# Patient Record
Sex: FEMALE | Race: WHITE | NOT HISPANIC OR LATINO | Employment: OTHER | URBAN - METROPOLITAN AREA
[De-identification: names, ages, dates, MRNs, and addresses within clinical notes are randomized per-mention and may not be internally consistent; named-entity substitution may affect disease eponyms.]

---

## 2017-05-12 ENCOUNTER — HOSPITAL ENCOUNTER (EMERGENCY)
Facility: HOSPITAL | Age: 47
Discharge: HOME | End: 2017-05-12
Attending: EMERGENCY MEDICINE
Payer: COMMERCIAL

## 2017-05-12 VITALS
HEART RATE: 88 BPM | RESPIRATION RATE: 17 BRPM | HEIGHT: 62 IN | TEMPERATURE: 98.5 F | OXYGEN SATURATION: 97 % | SYSTOLIC BLOOD PRESSURE: 139 MMHG | WEIGHT: 165 LBS | DIASTOLIC BLOOD PRESSURE: 81 MMHG | BODY MASS INDEX: 30.36 KG/M2

## 2017-05-12 DIAGNOSIS — J32.9 SINUSITIS: Primary | ICD-10-CM

## 2017-05-12 PROCEDURE — 99283 EMERGENCY DEPT VISIT LOW MDM: CPT

## 2017-05-12 RX ORDER — ALPRAZOLAM 2 MG/1
0.5 TABLET ORAL AS NEEDED
COMMUNITY

## 2017-05-12 RX ORDER — AZITHROMYCIN 250 MG/1
250 TABLET, FILM COATED ORAL DAILY
Qty: 6 TABLET | Refills: 0 | Status: SHIPPED | OUTPATIENT
Start: 2017-05-12 | End: 2017-05-17

## 2017-05-12 RX ORDER — SIMVASTATIN 40 MG
40 TABLET ORAL DAILY
COMMUNITY

## 2017-05-12 RX ORDER — DULOXETIN HYDROCHLORIDE 60 MG/1
60 CAPSULE, DELAYED RELEASE ORAL DAILY
COMMUNITY
Start: 2014-02-07

## 2017-05-12 RX ORDER — ESOMEPRAZOLE MAGNESIUM 10 MG/1
40 GRANULE, FOR SUSPENSION, EXTENDED RELEASE ORAL 2 TIMES DAILY
COMMUNITY
Start: 2014-02-07 | End: 2019-03-14 | Stop reason: CLARIF

## 2017-05-12 RX ORDER — METFORMIN HYDROCHLORIDE EXTENDED-RELEASE TABLETS 1000 MG/1
500 TABLET, FILM COATED, EXTENDED RELEASE ORAL
COMMUNITY

## 2018-01-12 NOTE — RESULT NOTES
Verified Results  NM GASTRIC EMPTYING [id number] 08:28AM Perry Rao Order Number: [id number]     Test Name Result Flag Reference   NM GASTRIC EMPTYING (Report)     GASTRIC EMPTYING STUDY     INDICATION: Irritable bowel syndrome, diarrhea      COMPARISON: None available     TECHNIQUE:  The study was performed following the oral administration of 0 93 mCi Tc-99m sulfur colloid combined with scrambled eggs, as part of a standard meal  Following the meal, one minute anterior and posterior images were obtained immediately and   at 0 25 hours, 0 5 hour, 1 hour, 1 5 hour, 2 hour, 3 hour and 4 hour intervals from the time of ingestion  Geometric mean analyses were then performed  As of March 1, 2016, this gastric emptying protocol has been modified and updated  The gastric    emptying times and the normal values reported below reflect the change in protocol  FINDINGS:     Gastric emptying at 0 5 hours = 10 (N < 30%)    Gastric emptying at 1 hour = 19 % (N = 10 - 70%)   Gastric emptying at 2 hours = 38 % (N = > 40%)   Gastric emptying at 3 hours = 58 % (N = > 70%)   Gastric emptying at 4 hours = 77 % (N = >90%)     Linear T-1/2 = 156 minutes         IMPRESSION:     Mild to moderately decreased rate of gastric emptying  Workstation performed: [id number]     Signed by:   Wendi Mauricio MD   7/6/16       Plan  Chronic gastroesophageal reflux disease, Colon cancer screening    · COLONOSCOPY ; every 4 years; Next [id number]; Status:Active  Diabetic gastroparesis    · Metoclopramide HCl - 5 MG Oral Tablet;  Take 1 tablet twice daily

## 2018-07-16 LAB
LEFT EYE DIABETIC RETINOPATHY: NORMAL
RIGHT EYE DIABETIC RETINOPATHY: NORMAL

## 2018-09-13 LAB
CREAT ?TM UR-SCNC: 164 MG/DL (ref 50–200)
EST. AVERAGE GLUCOSE BLD GHB EST-MCNC: 131 MG/DL
HBA1C MFR BLD HPLC: 6.2 % (ref 4.8–5.6)
MICROALBUMIN UR-MCNC: 2.8 MG/DL (ref 0–2)
MICROALBUMIN/CREAT UR: 17.1

## 2018-09-13 PROCEDURE — 3061F NEG MICROALBUMINURIA REV: CPT | Performed by: INTERNAL MEDICINE

## 2018-09-13 PROCEDURE — 3044F HG A1C LEVEL LT 7.0%: CPT | Performed by: INTERNAL MEDICINE

## 2018-09-14 ENCOUNTER — APPOINTMENT (OUTPATIENT)
Dept: RADIOLOGY | Facility: CLINIC | Age: 48
End: 2018-09-14
Payer: COMMERCIAL

## 2018-09-14 ENCOUNTER — OFFICE VISIT (OUTPATIENT)
Dept: INTERNAL MEDICINE CLINIC | Facility: CLINIC | Age: 48
End: 2018-09-14
Payer: COMMERCIAL

## 2018-09-14 VITALS
HEART RATE: 103 BPM | OXYGEN SATURATION: 96 % | SYSTOLIC BLOOD PRESSURE: 124 MMHG | BODY MASS INDEX: 28.82 KG/M2 | DIASTOLIC BLOOD PRESSURE: 86 MMHG | HEIGHT: 62 IN | WEIGHT: 156.6 LBS

## 2018-09-14 DIAGNOSIS — F31.76 BIPOLAR DISORDER, IN FULL REMISSION, MOST RECENT EPISODE DEPRESSED (HCC): ICD-10-CM

## 2018-09-14 DIAGNOSIS — E78.2 MIXED HYPERLIPIDEMIA: ICD-10-CM

## 2018-09-14 DIAGNOSIS — Z71.6 TOBACCO ABUSE COUNSELING: ICD-10-CM

## 2018-09-14 DIAGNOSIS — E04.2 NONTOXIC MULTINODULAR GOITER: ICD-10-CM

## 2018-09-14 DIAGNOSIS — E03.9 HYPOTHYROIDISM (ACQUIRED): ICD-10-CM

## 2018-09-14 DIAGNOSIS — Z71.3 DIETARY COUNSELING AND SURVEILLANCE: ICD-10-CM

## 2018-09-14 DIAGNOSIS — Z12.31 ENCOUNTER FOR SCREENING MAMMOGRAM FOR BREAST CANCER: ICD-10-CM

## 2018-09-14 DIAGNOSIS — J20.9 ACUTE BRONCHITIS, UNSPECIFIED ORGANISM: ICD-10-CM

## 2018-09-14 DIAGNOSIS — E11.9 TYPE 2 DIABETES MELLITUS WITHOUT COMPLICATION, WITHOUT LONG-TERM CURRENT USE OF INSULIN (HCC): Primary | Chronic | ICD-10-CM

## 2018-09-14 PROCEDURE — 4004F PT TOBACCO SCREEN RCVD TLK: CPT | Performed by: INTERNAL MEDICINE

## 2018-09-14 PROCEDURE — 71046 X-RAY EXAM CHEST 2 VIEWS: CPT

## 2018-09-14 PROCEDURE — 3725F SCREEN DEPRESSION PERFORMED: CPT | Performed by: INTERNAL MEDICINE

## 2018-09-14 PROCEDURE — 99204 OFFICE O/P NEW MOD 45 MIN: CPT | Performed by: INTERNAL MEDICINE

## 2018-09-14 PROCEDURE — 3008F BODY MASS INDEX DOCD: CPT | Performed by: INTERNAL MEDICINE

## 2018-09-14 RX ORDER — AZITHROMYCIN 250 MG/1
TABLET, FILM COATED ORAL
Qty: 6 TABLET | Refills: 0 | Status: SHIPPED | OUTPATIENT
Start: 2018-09-14 | End: 2018-09-19

## 2018-09-14 RX ORDER — GLIMEPIRIDE 1 MG/1
1 TABLET ORAL 2 TIMES DAILY
COMMUNITY
Start: 2018-09-07

## 2018-09-14 RX ORDER — PREDNISONE 20 MG/1
40 TABLET ORAL DAILY
Qty: 10 TABLET | Refills: 0 | Status: SHIPPED | OUTPATIENT
Start: 2018-09-14 | End: 2018-09-19

## 2018-09-14 RX ORDER — LEVOTHYROXINE SODIUM 50 MCG
50 TABLET ORAL DAILY
COMMUNITY
Start: 2018-08-27

## 2018-09-14 NOTE — PROGRESS NOTES
INTERNAL MEDICINE INITIAL OFFICE VISIT  [facility] - [facility]    NAME: Angelika Delvalle  AGE: 46 y o  SEX: female  : 1970     DATE: 2018     Assessment and Plan:     1  Type 2 diabetes mellitus without complication, without long-term current use of insulin ([address] )    Patient's diabetes is very well controlled  Continue current plan of care  Follow-up with endocrinology, podiatry, and eye doctor  Patient should also continue aspirin/statin  Smoking cessation and weight reduction is advised  2  Acute bronchitis    Patient needs to quit smoking  She is aware of the risks of smoking  Concerned she may have underlying COPD  Should get PFTs in the future  With her acute episode of bronchitis, will treat with steroids and antibiotics  - azithromycin (ZITHROMAX) 250 mg tablet; Take 2 tablets on day 1 and 1 tablet days 2-5  Dispense: 6 tablet; Refill: 0  - predniSONE 20 mg tablet; Take 2 tablets (40 mg total) by mouth daily for 5 days  Dispense: 10 tablet; Refill: 0  - XR chest pa & lateral; Future    3  Hypothyroidism (acquired)    Patient seeing endocrinology on Monday  Continue synthroid  4  Nontoxic multinodular goiter    Thyroid nodules monitored by endo  Previous biopsies have been negative  5  Mixed hyperlipidemia    Decreased intake of saturated and trans fat  Carbohydrate moderation  Continue statin  6  Encounter for screening mammogram for breast cancer    Check mammogram     - Mammo screening bilateral w 3d & cad; Future    7  Bipolar disorder, in full remission, most recent episode depressed [facility])    Patient currently stable  Follow-up with psychiatry  BMI counseling:   Patient's Body mass index is 28 64 kg/m²  Discussed the patient's BMI  The BMI is above average; BMI counseling and education was provided to the patient      General weight loss/lifestyle modification strategies discussed (elicit support from others; identify saboteurs; non-food rewards, etc)  Diet interventions: low calorie/low carb diet  Regular aerobic exercise program was recommended at least 3 times per week    Return in about 6 months (around 3/14/2019) for Follow-up  Counseling:     · Medication Side Effects - Adverse side effects of medications were reviewed with the patient/guardian today: Yes  · Counseling given regarding: Diagnostic results, Prognosis, Risks and benefits of tx options, Intructions for management, Patient and family education, Importance of tx compliance, Risk factor reductions and Impressions  · Barriers to treatment include: No identified barriers     Chief Complaint:     Chief Complaint   Patient presents with   [employer] Establish Care     new pt      History of Present Illness:     Patient presents to establish care  She has a history of hypothyroidism, thyroid nodules, type 2 diabetes (last A1C 6 2%), bipolar, mixed hyperlipidemia  Patient had recent lab testing performed through [facility]  Patient follows with multiple specialist - podiatrist, endocrinologist (sees on Monday), psychiatrist, eye doctor  Hasn't had a primary care doctor for awhile  She is a chronic smoker for over 30 years  She knows she needs to quit, but has accepted that it's not going to happen  She had her  die from lung cancer and mother has lung cancer  Chest x-ray last year was normal  She has been sick for past couple days after weather changed  Deep, barky cough  Some white phlegm she is coughing up  Increased SOB with exertion  Denies any fever or chills  She states her bipolar is fairly well controlled  Couldn't tolerate mood stabilizers in past  Had very bad reaction       The following portions of the patient's history were reviewed and updated as appropriate: allergies, current medications, past family history, past medical history, past social history, past surgical history and problem list      Review of Systems:     Review of Systems Constitutional: Negative for appetite change, chills, fatigue and fever  HENT: Negative for congestion, hearing loss, postnasal drip, rhinorrhea, sore throat, tinnitus and trouble swallowing  Eyes: Negative for pain, discharge, redness and visual disturbance  Respiratory: Positive for cough, chest tightness and shortness of breath  Negative for wheezing  Cardiovascular: Negative for chest pain, palpitations and leg swelling  Gastrointestinal: Negative for abdominal distention, abdominal pain, blood in stool, constipation, diarrhea, nausea and vomiting  Endocrine: Negative for cold intolerance, heat intolerance, polydipsia, polyphagia and polyuria  Genitourinary: Negative for difficulty urinating, dysuria, frequency, hematuria and urgency  Musculoskeletal: Negative for arthralgias, back pain, gait problem, joint swelling, myalgias, neck pain and neck stiffness  Skin: Negative for color change and rash  Neurological: Negative for dizziness, tremors, seizures, syncope, speech difficulty, weakness, light-headedness, numbness and headaches  Hematological: Negative for adenopathy  Does not bruise/bleed easily  Psychiatric/Behavioral: Negative for agitation, behavioral problems, confusion, hallucinations, sleep disturbance and suicidal ideas  The patient is not nervous/anxious  Past Medical History:     Past Medical History:   Diagnosis Date    Bipolar 1 disorder ([address] )     Colitis     Diabetes mellitus ([address] )     Diabetic gastroparesis ([address] ) 2016      Past Surgical History:     Past Surgical History:   Procedure Laterality Date    APPENDECTOMY       SECTION      CHOLECYSTECTOMY      HYSTEROSCOPY W/ ENDOMETRIAL ABLATION  2015    Last assessed    UTERINE FIBROID SURGERY        Social History:   She reports that she has been smoking  She does not have any smokeless tobacco history on file  She reports that she does not drink alcohol or use drugs       Family History:     Family History   Problem Relation Age of Onset    Lung cancer Mother     Heart attack Father     Heart failure Father     Colon cancer Maternal Uncle       Current Medications:     Current Outpatient Prescriptions:     ALPRAZolam (XANAX) 2 MG tablet, Take 0 5 mg by mouth as needed  , Disp: , Rfl:     DULoxetine (CYMBALTA) 60 mg delayed release capsule, Take 60 mg by mouth daily  , Disp: , Rfl:     esomeprazole (NexIUM) 10 MG packet, Take 40 mg by mouth 2 (two) times a day  , Disp: , Rfl:     glimepiride (AMARYL) 1 mg tablet, Take 1 mg by mouth daily with breakfast  , Disp: , Rfl:     metFORMIN (FORTAMET) 1000 MG (OSM) 24 hr tablet, Take 1,000 mg by mouth daily with breakfast  , Disp: , Rfl:     simvastatin (ZOCOR) 40 mg tablet, Take 40 mg by mouth daily  , Disp: , Rfl:     SYNTHROID 50 MCG tablet, Take 50 mcg by mouth daily  , Disp: , Rfl:     azithromycin (ZITHROMAX) 250 mg tablet, Take 2 tablets on day 1 and 1 tablet days 2-5, Disp: 6 tablet, Rfl: 0    predniSONE 20 mg tablet, Take 2 tablets (40 mg total) by mouth daily for 5 days, Disp: 10 tablet, Rfl: 0     Allergies:   No Known Allergies     Physical Exam:     /86 (BP Location: Left arm, Patient Position: Sitting, Cuff Size: Standard)   Pulse 103   Ht 5' 2" (1 575 m)   Wt 71 kg (156 lb 9 6 oz)   SpO2 96%   BMI 28 64 kg/m²     Physical Exam   Constitutional: She is oriented to person, place, and time  She appears well-developed and well-nourished  No distress  Eyes: Conjunctivae are normal  Right eye exhibits no discharge  Left eye exhibits no discharge  No scleral icterus  Neck: Neck supple  No JVD present  Thyroid mass and thyromegaly present  Cardiovascular: Normal rate, regular rhythm, normal heart sounds and intact distal pulses  Exam reveals no gallop and no friction rub  Pulses are no weak pulses  No murmur heard  Pulses:       Dorsalis pedis pulses are 2+ on the right side, and 2+ on the left side  Posterior tibial pulses are 2+ on the right side, and 2+ on the left side  Pulmonary/Chest: Effort normal  No respiratory distress  She has decreased breath sounds  She has no wheezes  She has no rales  She exhibits no tenderness  Abdominal: Soft  Bowel sounds are normal  She exhibits no distension and no mass  There is no tenderness  There is no rebound and no guarding  Musculoskeletal: Normal range of motion  She exhibits no edema  Feet:   Right Foot:   Skin Integrity: Negative for ulcer, skin breakdown, erythema, warmth, callus or dry skin  Left Foot:   Skin Integrity: Negative for ulcer, skin breakdown, erythema, warmth, callus or dry skin  Lymphadenopathy:     She has no cervical adenopathy  Neurological: She is alert and oriented to person, place, and time  Skin: Skin is warm and dry  She is not diaphoretic  Psychiatric: She has a normal mood and affect  Her behavior is normal    Vitals reviewed  Diabetic Foot Exam  Patient's shoes and socks removed  Right Foot/Ankle   Right Foot Inspection  Skin Exam: skin normal and skin intact no dry skin, no warmth, no callus, no erythema, no maceration, no abnormal color, no pre-ulcer, no ulcer and no callus                          Toe Exam: ROM and strength within normal limits  Sensory     Proprioception: intact   Monofilament testing: intact  Vascular  Capillary refills: < 3 seconds  The right DP pulse is 2+  The right PT pulse is 2+  Left Foot/Ankle  Left Foot Inspection  Skin Exam: skin normal and skin intactno dry skin, no warmth, no erythema, no maceration, normal color, no pre-ulcer, no ulcer and no callus                         Toe Exam: ROM and strength within normal limits                   Sensory     Proprioception: intact  Monofilament: intact  Vascular  Capillary refills: < 3 seconds  The left DP pulse is 2+  The left PT pulse is 2+  Assign Risk Category:  No deformity present; No loss of protective sensation;  No weak pulses Risk: 0     Data:     Laboratory Results: I have personally reviewed the pertinent laboratory results/reports      Labs on 9/13/2018 (Care Everywhere):  - A1C: 6 2%  - Albumin/creatinine ratio: normal    Labs on 1/23/2018 (Care Everywhere):  - Glucose 115, Cr 0 61, A1C 7 0%,   - TSH 6 02, Free T4 0 96  - , , LDL 80, HDL 37    Radiology/Other Diagnostic Testing Results: I have personally reviewed pertinent reports  US thyroid 11/15/2017 (Care Everywhere)  Redemonstration of small bilateral nodules, including hypoechoic 11 mm  right-sided nodule with small calcifications  A couple subcentimeter nodules  are more conspicuous bilaterally      Quin Melendez DO  [facility]

## 2018-09-14 NOTE — PATIENT INSTRUCTIONS

## 2018-09-18 ENCOUNTER — TELEPHONE (OUTPATIENT)
Dept: INTERNAL MEDICINE CLINIC | Facility: CLINIC | Age: 48
End: 2018-09-18

## 2018-09-18 NOTE — TELEPHONE ENCOUNTER
----- Message from Britney Lin DO sent at 9/18/2018  4:13 PM EDT -----  Let patient know chest x-ray was normal

## 2018-12-06 DIAGNOSIS — K21.9 GASTROESOPHAGEAL REFLUX DISEASE WITHOUT ESOPHAGITIS: Primary | ICD-10-CM

## 2018-12-06 RX ORDER — ESOMEPRAZOLE MAGNESIUM 40 MG/1
CAPSULE, DELAYED RELEASE ORAL
Qty: 180 CAPSULE | Refills: 8 | Status: SHIPPED | OUTPATIENT
Start: 2018-12-06 | End: 2020-03-16

## 2019-02-04 LAB
CHOLEST BLD STRIP: 174 MG/DL (ref ?–200)
HDLC SERPL-MCNC: 39 MG/DL (ref 40–?)
LDLC SERPL CALC-MCNC: 100 MG/DL (ref ?–130)
TRIGL SERPL-MCNC: 176 MG/DL (ref ?–150)

## 2019-02-05 LAB — HBA1C MFR BLD HPLC: 7.4 % (ref ?–5.7)

## 2019-02-05 PROCEDURE — 3045F PR MOST RECENT HEMOGLOBIN A1C LEVEL 7.0-9.0%: CPT | Performed by: INTERNAL MEDICINE

## 2019-03-14 ENCOUNTER — OFFICE VISIT (OUTPATIENT)
Dept: INTERNAL MEDICINE CLINIC | Facility: CLINIC | Age: 49
End: 2019-03-14
Payer: COMMERCIAL

## 2019-03-14 VITALS
BODY MASS INDEX: 31.09 KG/M2 | HEART RATE: 101 BPM | WEIGHT: 170 LBS | DIASTOLIC BLOOD PRESSURE: 70 MMHG | OXYGEN SATURATION: 96 % | SYSTOLIC BLOOD PRESSURE: 132 MMHG

## 2019-03-14 DIAGNOSIS — E11.9 TYPE 2 DIABETES MELLITUS WITHOUT COMPLICATION, WITHOUT LONG-TERM CURRENT USE OF INSULIN (HCC): Primary | Chronic | ICD-10-CM

## 2019-03-14 DIAGNOSIS — R07.81 PLEURITIC PAIN: ICD-10-CM

## 2019-03-14 DIAGNOSIS — F17.200 CURRENT SMOKER: ICD-10-CM

## 2019-03-14 DIAGNOSIS — E78.2 MIXED HYPERLIPIDEMIA: ICD-10-CM

## 2019-03-14 DIAGNOSIS — E03.9 HYPOTHYROIDISM (ACQUIRED): ICD-10-CM

## 2019-03-14 DIAGNOSIS — R06.00 DYSPNEA, UNSPECIFIED TYPE: ICD-10-CM

## 2019-03-14 PROCEDURE — 4004F PT TOBACCO SCREEN RCVD TLK: CPT | Performed by: INTERNAL MEDICINE

## 2019-03-14 PROCEDURE — 99214 OFFICE O/P EST MOD 30 MIN: CPT | Performed by: INTERNAL MEDICINE

## 2019-03-14 RX ORDER — ASPIRIN 81 MG/1
81 TABLET, CHEWABLE ORAL DAILY
COMMUNITY
Start: 2013-10-11 | End: 2019-09-16 | Stop reason: CLARIF

## 2019-03-14 NOTE — PATIENT INSTRUCTIONS
Type 2 Diabetes in Adults   AMBULATORY CARE:   Type 2 diabetes  is a disease that affects how your body uses glucose (sugar)  Normally, when the blood sugar level increases, the pancreas makes more insulin  Insulin helps move sugar out of the blood so it can be used for energy  Type 2 diabetes develops because either the body cannot make enough insulin, or it cannot use the insulin correctly  After many years, your pancreas may stop making insulin  Common symptoms include the following:   · More hunger or thirst than usual     · Frequent urination     · Weight loss without trying     · Blurred vision  Call 911 if you have any of the following:   · You have any of the following signs of a stroke:      ¨ Numbness or drooping on one side of your face     ¨ Weakness in an arm or leg    ¨ Confusion or difficulty speaking    ¨ Dizziness, a severe headache, or vision loss    · You have any of the following signs of a heart attack:      ¨ Squeezing, pressure, or pain in your chest that lasts longer than 5 minutes or returns    ¨ Discomfort or pain in your back, neck, jaw, stomach, or arm     ¨ Trouble breathing    ¨ Nausea or vomiting    ¨ Lightheadedness or a sudden cold sweat, especially with chest pain or trouble breathing  Seek care immediately if:   · You have severe abdominal pain, or the pain spreads to your back  You may also be vomiting  · You have trouble staying awake or focusing  · You are shaking or sweating  · You have blurred or double vision  · Your breath has a fruity, sweet smell  · Your breathing is deep and labored, or rapid and shallow  · Your heartbeat is fast and weak  Contact your healthcare provider if:   · You are vomiting or have diarrhea  · You have an upset stomach and cannot eat the foods on your meal plan  · You feel weak or more tired than usual      · You feel dizzy, have headaches, or are easily irritated  · Your skin is red, warm, dry, or swollen  · You have a wound that does not heal      · You have numbness in your arms or legs  · You have trouble coping with your illness, or you feel anxious or depressed  · You have questions or concerns about your condition or care  Treatment for type 2 diabetes  includes keeping your blood sugar at a normal level  You must eat the right foods, and exercise regularly  You may need medicine if you cannot control your blood sugar level with nutrition and exercise  You may also need medicine to prevent heart disease, a complication of type 2 diabetes  You may  need any of the following:  · Hypoglycemic medicines or insulin  may be given to decrease the amount of sugar in your blood  · Blood pressure medicine  may be given to lower your blood pressure  Your blood pressure should be less than 140/90  · Cholesterol lowering medicine  may be given to prevent heart disease  · Antiplatelets , such as aspirin, help prevent blood clots  Take your antiplatelet medicine exactly as directed  These medicines make it more likely for you to bleed or bruise  If you are told to take aspirin, do not take acetaminophen or ibuprofen instead  · Take your medicine as directed  Contact your healthcare provider if you think your medicine is not helping or if you have side effects  Tell him or her if you are allergic to any medicine  Keep a list of the medicines, vitamins, and herbs you take  Include the amounts, and when and why you take them  Bring the list or the pill bottles to follow-up visits  Carry your medicine list with you in case of an emergency  Check your blood sugar level: You will be taught how to check a small drop of blood in a glucose monitor  You will need to check your blood sugar level at least 3 times each day if you are on insulin  Ask your healthcare provider when and how often to check during the day  If you check your blood sugar level before a meal , it should be between 80 and 130 mg/dL   If you check your blood sugar level 1 to 2 hours after a meal , it should be less than 180 mg/dL  Ask your healthcare provider if these are good goals for you  Write down your results, and show them to your healthcare provider  He may use the results to make changes to your medicine, food, and exercise schedules  If your blood sugar level is too low: Your blood sugar level is too low if it goes below 70 mg/dL  If the level is too low, eat or drink 15 grams of fast-acting carbohydrate  These are found naturally in fruits  Fast-acting carbohydrates will raise your blood sugar level quickly  Examples of 15 grams of fast-acting carbohydrate are 4 ounces (½ cup) of fruit juice or 4 ounces of regular soda  Other examples are 2 tablespoons of raisins or 3 to 4 glucose tablets  Check your blood sugar level 15 minutes later  If the level is still low (less than 100 mg/dL), eat another 15 grams of carbohydrate  When the level returns to 100 mg/dL, eat a snack or meal that contains carbohydrates  This will help prevent another drop in blood sugar  Always carefully follow your healthcare provider's instructions on how to treat low blood sugar levels  Check your feet each day for sores:  Wear shoes and socks that fit correctly  Do not trim your toenails  Ask your healthcare provider for more information about foot care  Follow your meal plan:  A dietitian will help you make a meal plan to keep your blood sugar level steady  Do not skip meals  Your blood sugar level may drop too low if you have taken diabetes medicine and do not eat  · Keep track of carbohydrates (sugar and starchy foods)  Your blood sugar level can get too high if you eat too many carbohydrates  Your dietitian will help you plan meals and snacks that have the right amount of carbohydrates  · Eat low-fat foods , such as skinless chicken and low-fat milk  · Eat less sodium (salt)    Limit high-sodium foods, such as soy sauce, potato chips, and soup  Do not add salt to food you cook  Limit your use of table salt  You should have less than 2,300 mg of sodium per day  · Eat high-fiber foods , such as vegetables, whole grain breads, and beans  · Limit alcohol  Alcohol affects your blood sugar level and can make it harder to manage your diabetes  Limit alcohol to 1 drink a day if you are a woman  Limit alcohol to 2 drinks a day if you are a man  A drink of alcohol is 12 ounces of beer, 5 ounces of wine, or 1½ ounces of liquor  Maintain a healthy weight:  Ask your healthcare provider how much you should weigh  A healthy weight can help you control your diabetes  Ask your provider to help you create a weight loss plan if you are overweight  Together you can set manageable weight loss goals  Exercise as directed:  Exercise can help keep your blood sugar level steady, decrease your risk of heart disease, and help you lose weight  Stretch before and after you exercise  Exercise for at least 150 minutes every week  Spread this amount of exercise over at least 3 days a week  Do not skip exercise more than 2 days in a row  Include muscle strengthening activities 2 to 3 days each week  Older adults should include balance training 2 to 3 times each week  Activities that help increase balance include yoga and adrien chi  Work with your healthcare provider to create an exercise plan  · Check your blood sugar level before and after exercise  Healthcare providers may tell you to change the amount of insulin you take or food you eat  If your blood sugar level is high, check your blood or urine for ketones before you exercise  Do not exercise if your blood sugar level is high and you have ketones  · If your blood sugar level is less than 100 mg/dL, have a carbohydrate snack before you exercise  Examples are 4 to 6 crackers, ½ banana, 8 ounces (1 cup) of milk, or 4 ounces (½ cup) of juice   Drink water or liquids that do not contain sugar before, during, and after exercise  Ask your dietitian or healthcare provider which liquids you should drink when you exercise  · Do not sit for longer than 30 minutes  If you cannot walk around, at least stand up  This will help you stay active and keep your blood circulating  Do not smoke:  Nicotine and other chemicals in cigarettes and cigars can cause lung damage and make it more difficult to manage your diabetes  Ask your healthcare provider for information if you currently smoke and need help to quit  Do not use e-cigarettes or smokeless tobacco in place of cigarettes or to help you quit  They still contain nicotine  Check your blood pressure as directed:  Ask your healthcare provider what your blood pressure should be  Most adults with diabetes and high blood pressure should have a systolic blood pressure (first number) less than 140  Your diastolic blood pressure (second number) should be less than 90  Wear medical alert identification:  Wear medical alert jewelry or carry a card that says you have diabetes  Ask your healthcare provider where to get these items  Ask about vaccines: You have a higher risk for serious illness if you get the flu, pneumonia, or hepatitis  Ask your healthcare provider if you should get a flu, pneumonia, or hepatitis B vaccine, and when to get the vaccine  Follow up with your healthcare provider as directed: You may need to return to have your A1c checked every 3 months  You will need to return at least once each year to have your feet checked  You will need an eye exam once a year to check for retinopathy  You will also need urine tests every year to check for kidney problems  You may need tests to monitor for heart disease such as an EKG, stress test, blood pressure monitoring, and blood tests  Write down your questions so you remember to ask them during your visits     © 2017 [address] [employer] Information is for End User's use only and may not be sold, redistributed or otherwise used for commercial purposes  All illustrations and images included in CareNotes® are the copyrighted property of [employer]  or [employer]  The above information is an  only  It is not intended as medical advice for individual conditions or treatments  Talk to your doctor, nurse or pharmacist before following any medical regimen to see if it is safe and effective for you

## 2019-03-14 NOTE — PROGRESS NOTES
INTERNAL MEDICINE FOLLOW-UP OFFICE VISIT  [facility] - [facility] [address] [place] [address]    NAME: Celia Nunez  AGE: 46 y o  SEX: female  : 1970     DATE: 3/14/2019     Assessment and Plan:     1  Type 2 diabetes mellitus without complication, without long-term current use of insulin (HCC)    Continue medications as prescribed  Work harder on diet  A1c goal less than 7%  Follow up with endocrinology as scheduled  - Hemoglobin A1C; Future  - Microalbumin / creatinine urine ratio; Future  - Basic metabolic panel; Future    2  Pleuritic pain  3  Current smoker  4  Dyspnea, unspecified type    Patient was highly encouraged to quit smoking  The benefits of tobacco cessation were discussed  The risk of continued tobacco consumption were discussed  Will order CT of the chest with contrast for further evaluation  - CT chest w contrast; Future    5  Hypothyroidism (acquired)    TSH is within normal limits  Continue levothyroxine  6  Mixed hyperlipidemia    Cholesterol is controlled  Continue statin as prescribed  BMI Counseling: Body mass index is 31 09 kg/m²  Discussed the patient's BMI with her  The BMI is above average  BMI counseling and education was provided to the patient  Nutrition recommendations include reducing portion sizes, decreasing overall calorie intake, moderation in carbohydrate intake and increasing intake of lean protein  Return in about 6 months (around 2019) for Follow-up and Medicare Wellness Visit  Chief Complaint:     Chief Complaint   Patient presents with    Follow-up     6 month        History of Present Illness:     Patient presents for routine follow-up  She has underlying history of type 2 diabetes, bipolar disorder, hypothyroidism, hyperlipidemia  Patient continues to smoke and has not been eating well  Her A1c is up to 7 4%    She canceled her endocrinologist appointment and moved it until August so that she can get her A1c under better control  She admits to shortness of breath with exertion and pain in her right lower lung when she is breathing  She denies any chest pain  She denies any palpitations  The following portions of the patient's history were reviewed and updated as appropriate: allergies, current medications, past family history, past medical history, past social history, past surgical history and problem list      Review of Systems:     Review of Systems   Constitutional: Negative for activity change, appetite change and fatigue  Respiratory: Positive for shortness of breath  Negative for apnea, cough, chest tightness and wheezing  Cardiovascular: Negative for chest pain, palpitations and leg swelling  Gastrointestinal: Negative for abdominal distention, abdominal pain, blood in stool, constipation, diarrhea, nausea and vomiting  Musculoskeletal: Positive for arthralgias and back pain  Negative for gait problem, joint swelling and myalgias  Skin: Negative for rash and wound  Neurological: Negative for dizziness, weakness, light-headedness, numbness and headaches  Psychiatric/Behavioral: Negative for behavioral problems, confusion, hallucinations, sleep disturbance and suicidal ideas  The patient is not nervous/anxious  Problem List:     Patient Active Problem List   Diagnosis    Chronic gastroesophageal reflux disease    Bipolar disorder ([facility] [address] )    Hypothyroidism (acquired)    Irritable bowel syndrome with diarrhea    Mixed hyperlipidemia    Nontoxic multinodular goiter    Type 2 diabetes mellitus without complication, without long-term current use of insulin ([facility])      Objective:     /70 (BP Location: Left arm, Patient Position: Sitting, Cuff Size: Standard)   Pulse 101   Wt 77 1 kg (170 lb) Comment: w/ shoe denied off  SpO2 96%   BMI 31 09 kg/m²     Physical Exam   Constitutional: She is oriented to person, place, and time  She appears well-developed and well-nourished   No distress  Eyes: Conjunctivae are normal  Right eye exhibits no discharge  Left eye exhibits no discharge  No scleral icterus  Neck: Neck supple  No JVD present  No thyromegaly present  Cardiovascular: Normal rate, regular rhythm and normal heart sounds  No murmur heard  Pulmonary/Chest: Effort normal and breath sounds normal  No respiratory distress  She has no wheezes  She has no rales  She exhibits no tenderness  Abdominal: Soft  Bowel sounds are normal  She exhibits no distension and no mass  There is no tenderness  There is no rebound and no guarding  No hernia  Musculoskeletal: She exhibits no edema  Lymphadenopathy:     She has no cervical adenopathy  Neurological: She is alert and oriented to person, place, and time  Skin: Skin is warm and dry  She is not diaphoretic  Psychiatric: She has a normal mood and affect  Her behavior is normal    Vitals reviewed      Lulú Buenrostro DO  [facility]

## 2019-03-16 DIAGNOSIS — Z12.31 ENCOUNTER FOR SCREENING MAMMOGRAM FOR BREAST CANCER: ICD-10-CM

## 2019-03-18 ENCOUNTER — TELEPHONE (OUTPATIENT)
Dept: INTERNAL MEDICINE CLINIC | Facility: CLINIC | Age: 49
End: 2019-03-18

## 2019-03-18 NOTE — TELEPHONE ENCOUNTER
----- Message from Master Jones DO sent at 3/17/2019 11:27 AM EDT -----  Let patient know mammogram was normal

## 2019-03-26 ENCOUNTER — HOSPITAL ENCOUNTER (OUTPATIENT)
Dept: CT IMAGING | Facility: CLINIC | Age: 49
Discharge: HOME | End: 2019-03-26
Payer: COMMERCIAL

## 2019-03-26 DIAGNOSIS — R07.81 PLEURITIC PAIN: ICD-10-CM

## 2019-03-26 DIAGNOSIS — R06.00 DYSPNEA, UNSPECIFIED TYPE: ICD-10-CM

## 2019-03-26 DIAGNOSIS — F17.200 CURRENT SMOKER: ICD-10-CM

## 2019-03-26 PROCEDURE — 71260 CT THORAX DX C+: CPT

## 2019-03-26 RX ADMIN — IOHEXOL 85 ML: 350 INJECTION, SOLUTION INTRAVENOUS at 12:59

## 2019-04-01 ENCOUNTER — TELEPHONE (OUTPATIENT)
Dept: INTERNAL MEDICINE CLINIC | Facility: CLINIC | Age: 49
End: 2019-04-01

## 2019-04-01 DIAGNOSIS — E27.8 ADRENAL NODULE (HCC): Primary | ICD-10-CM

## 2019-04-02 ENCOUNTER — HOSPITAL ENCOUNTER (OUTPATIENT)
Dept: CT IMAGING | Facility: CLINIC | Age: 49
Discharge: HOME | End: 2019-04-02
Payer: COMMERCIAL

## 2019-04-02 DIAGNOSIS — E27.8 ADRENAL NODULE (HCC): ICD-10-CM

## 2019-04-02 PROCEDURE — 74178 CT ABD&PLV WO CNTR FLWD CNTR: CPT

## 2019-04-02 RX ADMIN — IOHEXOL 100 ML: 350 INJECTION, SOLUTION INTRAVENOUS at 11:34

## 2019-04-05 ENCOUNTER — TELEPHONE (OUTPATIENT)
Dept: INTERNAL MEDICINE CLINIC | Facility: CLINIC | Age: 49
End: 2019-04-05

## 2019-08-12 LAB
LEFT EYE DIABETIC RETINOPATHY: NORMAL
RIGHT EYE DIABETIC RETINOPATHY: NORMAL

## 2019-08-28 LAB
ARTICHOKE IGE QN: 114 MG/DL
CHOLEST BLD STRIP: 188 MG/DL
HBA1C MFR BLD HPLC: 7.2 % (ref ?–5.7)
HDLC SERPL-MCNC: 42 MG/DL
TRIGL SERPL-MCNC: 160 MG/DL (ref 0–149)

## 2019-08-28 PROCEDURE — 3045F PR MOST RECENT HEMOGLOBIN A1C LEVEL 7.0-9.0%: CPT | Performed by: INTERNAL MEDICINE

## 2019-09-16 ENCOUNTER — OFFICE VISIT (OUTPATIENT)
Dept: INTERNAL MEDICINE CLINIC | Facility: CLINIC | Age: 49
End: 2019-09-16
Payer: COMMERCIAL

## 2019-09-16 VITALS
HEIGHT: 62 IN | HEART RATE: 79 BPM | WEIGHT: 168.8 LBS | BODY MASS INDEX: 31.06 KG/M2 | DIASTOLIC BLOOD PRESSURE: 82 MMHG | OXYGEN SATURATION: 97 % | SYSTOLIC BLOOD PRESSURE: 128 MMHG

## 2019-09-16 DIAGNOSIS — E27.8 ADRENAL NODULE (HCC): ICD-10-CM

## 2019-09-16 DIAGNOSIS — E78.2 MIXED HYPERLIPIDEMIA: ICD-10-CM

## 2019-09-16 DIAGNOSIS — E11.9 TYPE 2 DIABETES MELLITUS WITHOUT COMPLICATION, WITHOUT LONG-TERM CURRENT USE OF INSULIN (HCC): Primary | Chronic | ICD-10-CM

## 2019-09-16 DIAGNOSIS — E03.9 HYPOTHYROIDISM (ACQUIRED): ICD-10-CM

## 2019-09-16 DIAGNOSIS — Z00.00 MEDICARE ANNUAL WELLNESS VISIT, INITIAL: ICD-10-CM

## 2019-09-16 PROBLEM — E27.9 ADRENAL NODULE (HCC): Status: ACTIVE | Noted: 2019-05-01

## 2019-09-16 PROCEDURE — 3008F BODY MASS INDEX DOCD: CPT | Performed by: INTERNAL MEDICINE

## 2019-09-16 PROCEDURE — G0438 PPPS, INITIAL VISIT: HCPCS | Performed by: INTERNAL MEDICINE

## 2019-09-16 PROCEDURE — 99214 OFFICE O/P EST MOD 30 MIN: CPT | Performed by: INTERNAL MEDICINE

## 2019-09-16 NOTE — PROGRESS NOTES
INTERNAL MEDICINE FOLLOW-UP OFFICE VISIT  [facility] - [facility] [address] [place] [address]    NAME: Lucas Rosenbaum  AGE: 44 y o  SEX: female  : 1970     DATE: 2019     Assessment and Plan:     1  Type 2 diabetes mellitus without complication, without long-term current use of insulin (HCC)  Assessment & Plan:  Lab Results   Component Value Date    HGBA1C 7 2 (A) 2019     Continue fortamet and amaryl as prescribed  Follows with endocrinology  No evidence of microvascular disease  No recent hypoglycemic symptoms  Diet and physical activity recommendations given  Try to get BMI <30     2  Hypothyroidism (acquired)  Assessment & Plan:  Hypothyroid has been stable on medications  Continue to monitor with endocrinology  3  Mixed hyperlipidemia  Assessment & Plan:  Doing well on zocor  LDL is controlled  Will not make any changes  4  Adrenal nodule ([address] )  Assessment & Plan:  CT scan showed evidence of benign adrenal adenoma  Lab work did not show any evidence of secretory adenoma  Return in about 6 months (around 3/16/2020) for Follow-up  Chief Complaint:     Chief Complaint   Patient presents with    Medicare Wellness Visit    Follow-up     w/ labs- 2019      History of Present Illness:     Patient presents for routine follow-up  · Diabetes: sees endocrinology  Last A1C 7 2%  Not currently on insulin  No hypoglycemic episodes  · Hyperlipidemia: tolerating statin well  No history of ASCVD  No myalgias  · Hypothyroidism: TSH has been normal  Doing well on levothyroxine  · Tobacco abuse: continues to smoke  Has no desire to quit  · Adrenal nodule: incidentally found on CT scan  Had lab work from endocrinology which showed no evidence of secretory adenoma  Review of Systems:     Review of Systems   Constitutional: Negative for activity change, appetite change and fatigue     Respiratory: Negative for apnea, cough, chest tightness, shortness of breath and wheezing  Cardiovascular: Negative for chest pain, palpitations and leg swelling  Gastrointestinal: Negative for abdominal distention, abdominal pain, blood in stool, constipation, diarrhea, nausea and vomiting  Musculoskeletal: Negative for arthralgias, back pain, gait problem, joint swelling and myalgias  Skin: Negative for rash and wound  Neurological: Negative for dizziness, weakness, light-headedness, numbness and headaches  Psychiatric/Behavioral: Negative for behavioral problems, confusion, hallucinations, sleep disturbance and suicidal ideas  The patient is not nervous/anxious  Problem List:     Patient Active Problem List   Diagnosis    Chronic gastroesophageal reflux disease    Bipolar disorder ([address] )    Hypothyroidism (acquired)    Irritable bowel syndrome with diarrhea    Mixed hyperlipidemia    Nontoxic multinodular goiter    Type 2 diabetes mellitus without complication, without long-term current use of insulin (HCC)    Adrenal nodule (HCC)      Objective:     /82 (BP Location: Left arm, Patient Position: Sitting, Cuff Size: Standard)   Pulse 79   Ht 5' 1 75" (1 568 m)   Wt 76 6 kg (168 lb 12 8 oz)   SpO2 97%   BMI 31 12 kg/m²     Physical Exam   Constitutional: She is oriented to person, place, and time  She appears well-developed and well-nourished  No distress  Obesity   Eyes: Conjunctivae are normal  Right eye exhibits no discharge  Left eye exhibits no discharge  No scleral icterus  Neck: Neck supple  No JVD present  No thyromegaly present  Cardiovascular: Normal rate, regular rhythm and normal heart sounds  Pulses are no weak pulses  No murmur heard  Pulses:       Dorsalis pedis pulses are 2+ on the right side, and 2+ on the left side  Posterior tibial pulses are 2+ on the right side, and 2+ on the left side  Pulmonary/Chest: Effort normal and breath sounds normal  No respiratory distress  She has no wheezes  She has no rales   She exhibits no tenderness  Abdominal: Soft  Bowel sounds are normal  She exhibits no distension  There is no tenderness  Musculoskeletal: She exhibits no edema  Feet:   Right Foot:   Skin Integrity: Negative for ulcer, skin breakdown, erythema, warmth, callus or dry skin  Left Foot:   Skin Integrity: Negative for ulcer, skin breakdown, erythema, warmth, callus or dry skin  Lymphadenopathy:     She has no cervical adenopathy  Neurological: She is alert and oriented to person, place, and time  Skin: Skin is warm and dry  She is not diaphoretic  Psychiatric: She has a normal mood and affect  Her behavior is normal    Vitals reviewed  Diabetic Foot Exam  Patient's shoes and socks removed  Right Foot/Ankle   Right Foot Inspection  Skin Exam: skin normal and skin intact no dry skin, no warmth, no callus, no erythema, no maceration, no abnormal color, no pre-ulcer, no ulcer and no callus                          Toe Exam: ROM and strength within normal limits  Sensory     Proprioception: intact   Monofilament testing: intact  Vascular  Capillary refills: < 3 seconds  The right DP pulse is 2+  The right PT pulse is 2+  Left Foot/Ankle  Left Foot Inspection  Skin Exam: skin normal and skin intactno dry skin, no warmth, no erythema, no maceration, normal color, no pre-ulcer, no ulcer and no callus                         Toe Exam: ROM and strength within normal limits                   Sensory     Proprioception: intact  Monofilament: intact  Vascular  Capillary refills: < 3 seconds  The left DP pulse is 2+  The left PT pulse is 2+  Assign Risk Category:  No deformity present; No loss of protective sensation;  No weak pulses       Risk: 0    Autumn Brower DO  MEDICAL [id number]

## 2019-09-16 NOTE — PATIENT INSTRUCTIONS
Medicare Preventive Visit Patient Instructions  Thank you for completing your Welcome to Medicare Visit or Medicare Annual Wellness Visit today  Your next wellness visit will be due in one year (9/16/2020)  The screening/preventive services that you may require over the next 5-10 years are detailed below  Some tests may not apply to you based off risk factors and/or age  Screening tests ordered at today's visit but not completed yet may show as past due  Also, please note that scanned in results may not display below  Preventive Screenings:  Service Recommendations Previous Testing/Comments   Colorectal Cancer Screening  * Colonoscopy    * Fecal Occult Blood Test (FOBT)/Fecal Immunochemical Test (FIT)  * Fecal DNA/Cologuard Test  * Flexible Sigmoidoscopy Age: 54-65 years old   Colonoscopy: every 10 years (may be performed more frequently if at higher risk)  OR  FOBT/FIT: every 1 year  OR  Cologuard: every 3 years  OR  Sigmoidoscopy: every 5 years  Screening may be recommended earlier than age 48 if at higher risk for colorectal cancer  Also, an individualized decision between you and your healthcare provider will decide whether screening between the ages of 74-80 would be appropriate  Colonoscopy: 03/16/2016    Screening Current     Breast Cancer Screening Age: 36 years old  Frequency: every 1-2 years  Not required if history of left and right mastectomy Mammogram: 03/14/2019    Screening Current   Cervical Cancer Screening Between the ages of 21-29, pap smear recommended once every 3 years  Between the ages of 33-67, can perform pap smear with HPV co-testing every 5 years     Recommendations may differ for women with a history of total hysterectomy, cervical cancer, or abnormal pap smears in past  Pap Smear: 07/17/2015    Due for Pap Smear   Hepatitis C Screening Once for adults born between 1945 and 1965  More frequently in patients at high risk for Hepatitis C Hep C Antibody: Not on file    Screening Not Indicated   Diabetes Screening 1-2 times per year if you're at risk for diabetes or have pre-diabetes Fasting glucose: No results in last 5 years   A1C: 7 2 %    Screening Not Indicated  History Diabetes   Cholesterol Screening Once every 5 years if you don't have a lipid disorder  May order more often based on risk factors  Lipid panel: 08/28/2019    Screening Not Indicated  History Lipid Disorder     Other Preventive Screenings Covered by Medicare:  1  Abdominal Aortic Aneurysm (AAA) Screening: covered once if your at risk  You're considered to be at risk if you have a family history of AAA  2  Lung Cancer Screening: covers low dose CT scan once per year if you meet all of the following conditions: (1) Age 50-69; (2) No signs or symptoms of lung cancer; (3) Current smoker or have quit smoking within the last 15 years; (4) You have a tobacco smoking history of at least 30 pack years (packs per day multiplied by number of years you smoked); (5) You get a written order from a healthcare provider  3  Glaucoma Screening: covered annually if you're considered high risk: (1) You have diabetes OR (2) Family history of glaucoma OR (3)  aged 48 and older OR (3)  American aged 72 and older  3  Osteoporosis Screening: covered every 2 years if you meet one of the following conditions: (1) You're estrogen deficient and at risk for osteoporosis based off medical history and other findings; (2) Have a vertebral abnormality; (3) On glucocorticoid therapy for more than 3 months; (4) Have primary hyperparathyroidism; (5) On osteoporosis medications and need to assess response to drug therapy  · Last bone density test (DXA Scan): Not on file  5  HIV Screening: covered annually if you're between the age of 12-76  Also covered annually if you are younger than 13 and older than 72 with risk factors for HIV infection   For pregnant patients, it is covered up to 3 times per pregnancy  Immunizations:  Immunization Recommendations   Influenza Vaccine Annual influenza vaccination during flu season is recommended for all persons aged >= 6 months who do not have contraindications   Pneumococcal Vaccine (Prevnar and Pneumovax)  * Prevnar = PCV13  * Pneumovax = PPSV23   Adults 25-60 years old: 1-3 doses may be recommended based on certain risk factors  Adults 72 years old: Prevnar (PCV13) vaccine recommended followed by Pneumovax (PPSV23) vaccine  If already received PPSV23 since turning 65, then PCV13 recommended at least one year after PPSV23 dose  Hepatitis B Vaccine 3 dose series if at intermediate or high risk (ex: diabetes, end stage renal disease, liver disease)   Tetanus (Td) Vaccine - COST NOT COVERED BY MEDICARE PART B Following completion of primary series, a booster dose should be given every 10 years to maintain immunity against tetanus  Td may also be given as tetanus wound prophylaxis  Tdap Vaccine - COST NOT COVERED BY MEDICARE PART B Recommended at least once for all adults  For pregnant patients, recommended with each pregnancy  Shingles Vaccine (Shingrix) - COST NOT COVERED BY MEDICARE PART B  2 shot series recommended in those aged 48 and above     Health Maintenance Due:      Topic Date Due    PAP SMEAR  07/17/2018    MAMMOGRAM  03/14/2020    CRC Screening: Colonoscopy  03/16/2020     Immunizations Due:      Topic Date Due    Pneumococcal Vaccine: Pediatrics (0 to 5 Years) and At-Risk Patients (6 to 59 Years) (1 of 1 - PPSV23) 08/31/1974    HEPATITIS B VACCINES (1 of 3 - Risk 3-dose series) 08/31/1987    DTaP,Tdap,and Td Vaccines (1 - Tdap) 08/31/1989    INFLUENZA VACCINE  07/01/2019     Advance Directives   What are advance directives? Advance directives are legal documents that state your wishes and plans for medical care  These plans are made ahead of time in case you lose your ability to make decisions for yourself   Advance directives can apply to any medical decision, such as the treatments you want, and if you want to donate organs  What are the types of advance directives? There are many types of advance directives, and each state has rules about how to use them  You may choose a combination of any of the following:  · Living will: This is a written record of the treatment you want  You can also choose which treatments you do not want, which to limit, and which to stop at a certain time  This includes surgery, medicine, IV fluid, and tube feedings  · Durable power of  for healthcare [facility]): This is a written record that states who you want to make healthcare choices for you when you are unable to make them for yourself  This person, called a proxy, is usually a family member or a friend  You may choose more than 1 proxy  · Do not resuscitate (DNR) order:  A DNR order is used in case your heart stops beating or you stop breathing  It is a request not to have certain forms of treatment, such as CPR  A DNR order may be included in other types of advance directives  · Medical directive: This covers the care that you want if you are in a coma, near death, or unable to make decisions for yourself  You can list the treatments you want for each condition  Treatment may include pain medicine, surgery, blood transfusions, dialysis, IV or tube feedings, and a ventilator (breathing machine)  · Values history: This document has questions about your views, beliefs, and how you feel and think about life  This information can help others choose the care that you would choose  Why are advance directives important? An advance directive helps you control your care  Although spoken wishes may be used, it is better to have your wishes written down  Spoken wishes can be misunderstood, or not followed  Treatments may be given even if you do not want them  An advance directive may make it easier for your family to make difficult choices about your care  Cigarette Smoking and Your Health   Risks to your health if you smoke:  Nicotine and other chemicals found in tobacco damage every cell in your body  Even if you are a light smoker, you have an increased risk for cancer, heart disease, and lung disease  If you are pregnant or have diabetes, smoking increases your risk for complications  Benefits to your health if you stop smoking:   · You decrease respiratory symptoms such as coughing, wheezing, and shortness of breath  · You reduce your risk for cancers of the lung, mouth, throat, kidney, bladder, pancreas, stomach, and cervix  If you already have cancer, you increase the benefits of chemotherapy  You also reduce your risk for cancer returning or a second cancer from developing  · You reduce your risk for heart disease, blood clots, heart attack, and stroke  · You reduce your risk for lung infections, and diseases such as pneumonia, asthma, chronic bronchitis, and emphysema  · Your circulation improves  More oxygen can be delivered to your body  If you have diabetes, you lower your risk for complications, such as kidney, artery, and eye diseases  You also lower your risk for nerve damage  Nerve damage can lead to amputations, poor vision, and blindness  · You improve your body's ability to heal and to fight infections  For more information and support to stop smoking:   · [employer]  gov  Phone: 3- 378 - 615-4000  Web Address: www [employer]  Weight Management   Why it is important to manage your weight:  Being overweight increases your risk of health conditions such as heart disease, high blood pressure, type 2 diabetes, and certain types of cancer  It can also increase your risk for osteoarthritis, sleep apnea, and other respiratory problems  Aim for a slow, steady weight loss  Even a small amount of weight loss can lower your risk of health problems    How to lose weight safely:  A safe and healthy way to lose weight is to eat fewer calories and get regular exercise  You can lose up about 1 pound a week by decreasing the number of calories you eat by 500 calories each day  Healthy meal plan for weight management:  A healthy meal plan includes a variety of foods, contains fewer calories, and helps you stay healthy  A healthy meal plan includes the following:  · Eat whole-grain foods more often  A healthy meal plan should contain fiber  Fiber is the part of grains, fruits, and vegetables that is not broken down by your body  Whole-grain foods are healthy and provide extra fiber in your diet  Some examples of whole-grain foods are whole-wheat breads and pastas, oatmeal, brown rice, and bulgur  · Eat a variety of vegetables every day  Include dark, leafy greens such as spinach, kale, samson greens, and mustard greens  Eat yellow and orange vegetables such as carrots, sweet potatoes, and winter squash  · Eat a variety of fruits every day  Choose fresh or canned fruit (canned in its own juice or light syrup) instead of juice  Fruit juice has very little or no fiber  · Eat low-fat dairy foods  Drink fat-free (skim) milk or 1% milk  Eat fat-free yogurt and low-fat cottage cheese  Try low-fat cheeses such as mozzarella and other reduced-fat cheeses  · Choose meat and other protein foods that are low in fat  Choose beans or other legumes such as split peas or lentils  Choose fish, skinless poultry (chicken or turkey), or lean cuts of red meat (beef or pork)  Before you cook meat or poultry, cut off any visible fat  · Use less fat and oil  Try baking foods instead of frying them  Add less fat, such as margarine, sour cream, regular salad dressing and mayonnaise to foods  Eat fewer high-fat foods  Some examples of high-fat foods include french fries, doughnuts, ice cream, and cakes  · Eat fewer sweets  Limit foods and drinks that are high in sugar  This includes candy, cookies, regular soda, and sweetened drinks    Exercise:  Exercise at least 30 minutes per day on most days of the week  Some examples of exercise include walking, biking, dancing, and swimming  You can also fit in more physical activity by taking the stairs instead of the elevator or parking farther away from stores  Ask your healthcare provider about the best exercise plan for you  © Copyright [employer] 2018 Information is for End User's use only and may not be sold, redistributed or otherwise used for commercial purposes   All illustrations and images included in CareNotes® are the copyrighted property of [employer]  or [address] [place] [address] [employer]

## 2019-09-16 NOTE — ASSESSMENT & PLAN NOTE
Lab Results   Component Value Date    HGBA1C 7 2 (A) 08/28/2019     Continue fortamet and amaryl as prescribed  Follows with endocrinology  No evidence of microvascular disease  No recent hypoglycemic symptoms  Diet and physical activity recommendations given  Try to get BMI <30

## 2019-09-16 NOTE — PROGRESS NOTES
Assessment and Plan:     1  Medicare annual wellness visit, initial    BMI Counseling: Body mass index is 31 12 kg/m²  The BMI is above normal  Nutrition recommendations include decreasing portion sizes, encouraging healthy choices of fruits and vegetables, limiting drinks that contain sugar, moderation in carbohydrate intake and increasing intake of lean protein  Exercise recommendations include exercising 3-5 times per week  Tobacco Cessation Counseling: Tobacco cessation counseling was provided  The patient is sincerely urged to quit consumption of tobacco  She is not ready to quit tobacco  Medication options discussed  Patient refused medication  Preventive health issues were discussed with patient, and age appropriate screening tests were ordered as noted in patient's After Visit Summary  Personalized health advice and appropriate referrals for health education or preventive services given if needed, as noted in patient's After Visit Summary       History of Present Illness:     Patient presents for Medicare Annual Wellness visit    Patient Care Team:  Damaso Lyons DO as PCP - General (Internal Medicine)     Problem List:     Patient Active Problem List   Diagnosis    Chronic gastroesophageal reflux disease    Bipolar disorder ([facility] )    Hypothyroidism (acquired)    Irritable bowel syndrome with diarrhea    Mixed hyperlipidemia    Nontoxic multinodular goiter    Type 2 diabetes mellitus without complication, without long-term current use of insulin ([facility] )      Past Medical and Surgical History:     Past Medical History:   Diagnosis Date    Bipolar 1 disorder ([facility] )     Colitis     Diabetes mellitus ([facility] )     Diabetic gastroparesis ([facility] ) 2016    HLD (hyperlipidemia)      Past Surgical History:   Procedure Laterality Date    APPENDECTOMY       SECTION      CHOLECYSTECTOMY      HERNIA REPAIR      HYSTEROSCOPY W/ ENDOMETRIAL ABLATION  2015    Last assessed    UTERINE FIBROID SURGERY      WISDOM TOOTH EXTRACTION        Family History:     Family History   Problem Relation Age of Onset    Lung cancer Mother     Heart attack Father     Heart failure Father     Colon cancer Maternal Uncle     Diabetes Maternal Grandmother     Heart attack Maternal Grandmother     Lung cancer Paternal Grandmother     Heart disease Paternal Grandfather     Cancer Maternal Uncle     Heart attack Maternal Uncle       Social History:     Social History     Socioeconomic History    Marital status:       Spouse name: None    Number of children: None    Years of education: None    Highest education level: None   Occupational History    None   Social Needs    Financial resource strain: None    Food insecurity:     Worry: None     Inability: None    Transportation needs:     Medical: None     Non-medical: None   Tobacco Use    Smoking status: Current Every Day Smoker     Packs/day: 1 00     Years: 35 00     Pack years: 35 00     Types: Cigarettes     Start date: 9/14/1983    Smokeless tobacco: Never Used   Substance and Sexual Activity    Alcohol use: Yes     Frequency: Monthly or less     Drinks per session: 5 or 6     Binge frequency: Less than monthly     Comment: occasionally    Drug use: No    Sexual activity: Never   Lifestyle    Physical activity:     Days per week: 0 days     Minutes per session: 0 min    Stress: Very much   Relationships    Social connections:     Talks on phone: None     Gets together: None     Attends Pentecostalism service: None     Active member of club or organization: None     Attends meetings of clubs or organizations: None     Relationship status: None    Intimate partner violence:     Fear of current or ex partner: None     Emotionally abused: None     Physically abused: None     Forced sexual activity: None   Other Topics Concern    None   Social History Narrative    None       Medications and Allergies:     Current Outpatient Medications   Medication Sig Dispense Refill    ALPRAZolam (XANAX) 2 MG tablet Take 0 5 mg by mouth as needed        DULoxetine (CYMBALTA) 60 mg delayed release capsule Take 60 mg by mouth daily        esomeprazole (NexIUM) 40 MG capsule TAKE 1 CAPSULE TWICE A  capsule 8    glimepiride (AMARYL) 1 mg tablet Take 1 mg by mouth 2 (two) times a day       metFORMIN (FORTAMET) 1000 MG (OSM) 24 hr tablet Take 500 mg by mouth daily with breakfast       simvastatin (ZOCOR) 40 mg tablet Take 40 mg by mouth daily        SYNTHROID 50 MCG tablet Take 50 mcg by mouth daily        aspirin 81 mg chewable tablet Chew 81 mg daily        No current facility-administered medications for this visit  No Known Allergies   Immunizations: There is no immunization history on file for this patient  Health Maintenance:         Topic Date Due    PAP SMEAR  07/17/2018    MAMMOGRAM  03/14/2020    CRC Screening: Colonoscopy  03/16/2020         Topic Date Due    Pneumococcal Vaccine: Pediatrics (0 to 5 Years) and At-Risk Patients (6 to 59 Years) (1 of 1 - PPSV23) 08/31/1974    HEPATITIS B VACCINES (1 of 3 - Risk 3-dose series) 08/31/1987    DTaP,Tdap,and Td Vaccines (1 - Tdap) 08/31/1989    INFLUENZA VACCINE  07/01/2019      Medicare Health Risk Assessment:     Ht 5' 1 75" (1 568 m)   Wt 76 6 kg (168 lb 12 8 oz)   BMI 31 12 kg/m²      Deepika Stone is here for her Initial Wellness visit  Health Risk Assessment:   Patient rates overall health as good  Patient feels that their physical health rating is same  Eyesight was rated as same  Hearing was rated as same  Patient feels that their emotional and mental health rating is same  Pain experienced in the last 7 days has been none  Patient states that she has experienced weight loss or gain in last 6 months  Depression Screening:   PHQ-2 Score: 0      Fall Risk Screening:    In the past year, patient has experienced: no history of falling in past year Urinary Incontinence Screening:   Patient has not leaked urine accidently in the last six months  Home Safety:  Patient does not have trouble with stairs inside or outside of their home  Patient has working smoke alarms and has no working carbon monoxide detector  Home safety hazards include: none  Nutrition:   Current diet is Diabetic  Medications:   Patient is not currently taking any over-the-counter supplements  Patient is able to manage medications  Activities of Daily Living (ADLs)/Instrumental Activities of Daily Living (IADLs):   Walk and transfer into and out of bed and chair?: Yes  Dress and groom yourself?: Yes    Bathe or shower yourself?: Yes    Feed yourself?  Yes  Do your laundry/housekeeping?: Yes  Manage your money, pay your bills and track your expenses?: Yes  Make your own meals?: Yes    Do your own shopping?: Yes    Durable Medical Equipment Suppliers  none    Previous Hospitalizations:   Any hospitalizations or ED visits within the last 12 months?: No      Advance Care Planning:   Living will: No    Durable POA for healthcare: No    Advanced directive: No    Five wishes given: No      Cognitive Screening:   Provider or family/friend/caregiver concerned regarding cognition?: No    PREVENTIVE SCREENINGS      Cardiovascular Screening:    General: Screening Not Indicated and History Lipid Disorder      Diabetes Screening:     General: Screening Not Indicated and History Diabetes      Colorectal Cancer Screening:     General: Screening Current      Breast Cancer Screening:     General: Screening Current      Cervical Cancer Screening:    General: Risks and Benefits Discussed    Due for: Cervical Pap Smear      Osteoporosis Screening:    General: Screening Not Indicated      Abdominal Aortic Aneurysm (AAA) Screening:        General: Screening Not Indicated      Lung Cancer Screening:     General: Screening Not Indicated      Hepatitis C Screening:    General: Screening Not Indicated    Other Counseling Topics:   Skin self-exam, sunscreen and calcium and vitamin D intake and regular weightbearing exercise         Juan Rivers, DO

## 2019-09-16 NOTE — ASSESSMENT & PLAN NOTE
CT scan showed evidence of benign adrenal adenoma  Lab work did not show any evidence of secretory adenoma

## 2019-12-04 PROBLEM — N18.1 CKD (CHRONIC KIDNEY DISEASE), STAGE I: Status: ACTIVE | Noted: 2019-12-04

## 2020-03-15 DIAGNOSIS — K21.9 GASTROESOPHAGEAL REFLUX DISEASE WITHOUT ESOPHAGITIS: ICD-10-CM

## 2020-03-16 RX ORDER — ESOMEPRAZOLE MAGNESIUM 40 MG/1
CAPSULE, DELAYED RELEASE ORAL
Qty: 180 CAPSULE | Refills: 3 | Status: SHIPPED | OUTPATIENT
Start: 2020-03-16 | End: 2021-04-27

## 2020-06-08 ENCOUNTER — TELEPHONE (OUTPATIENT)
Dept: INTERNAL MEDICINE CLINIC | Facility: CLINIC | Age: 50
End: 2020-06-08

## 2020-06-08 DIAGNOSIS — Z20.822 EXPOSURE TO COVID-19 VIRUS: Primary | ICD-10-CM

## 2020-06-09 ENCOUNTER — APPOINTMENT (OUTPATIENT)
Dept: LAB | Facility: CLINIC | Age: 50
End: 2020-06-09
Payer: COMMERCIAL

## 2020-06-09 DIAGNOSIS — Z20.822 EXPOSURE TO COVID-19 VIRUS: ICD-10-CM

## 2020-06-09 PROCEDURE — 86769 SARS-COV-2 COVID-19 ANTIBODY: CPT

## 2020-06-09 PROCEDURE — 36415 COLL VENOUS BLD VENIPUNCTURE: CPT

## 2020-06-10 ENCOUNTER — TELEPHONE (OUTPATIENT)
Dept: INTERNAL MEDICINE CLINIC | Facility: CLINIC | Age: 50
End: 2020-06-10

## 2020-06-10 LAB — SARS-COV-2 IGG SERPL QL IA: NEGATIVE

## 2020-07-09 ENCOUNTER — DOCUMENTATION (OUTPATIENT)
Dept: GASTROENTEROLOGY | Facility: CLINIC | Age: 50
End: 2020-07-09

## 2020-07-09 NOTE — LETTER
7/9/2020    Dear Anette Trinidad,    Review of our records shows you are due for the following:    Colonoscopy    Please call the following office to schedule your appointment:    [facility]    We look forward to hearing from you      Sincerely,    Dr Betty Gama

## 2020-07-15 ENCOUNTER — OFFICE VISIT (OUTPATIENT)
Dept: OBGYN CLINIC | Facility: CLINIC | Age: 50
End: 2020-07-15
Payer: COMMERCIAL

## 2020-07-15 VITALS — SYSTOLIC BLOOD PRESSURE: 110 MMHG | TEMPERATURE: 98.3 F | DIASTOLIC BLOOD PRESSURE: 62 MMHG

## 2020-07-15 DIAGNOSIS — N95.0 POSTMENOPAUSAL BLEEDING: Primary | ICD-10-CM

## 2020-07-15 PROCEDURE — 88305 TISSUE EXAM BY PATHOLOGIST: CPT | Performed by: PATHOLOGY

## 2020-07-15 PROCEDURE — 99202 OFFICE O/P NEW SF 15 MIN: CPT | Performed by: PHYSICIAN ASSISTANT

## 2020-07-15 PROCEDURE — 58100 BIOPSY OF UTERUS LINING: CPT | Performed by: PHYSICIAN ASSISTANT

## 2020-07-15 PROCEDURE — 4004F PT TOBACCO SCREEN RCVD TLK: CPT | Performed by: PHYSICIAN ASSISTANT

## 2020-07-15 PROCEDURE — 3066F NEPHROPATHY DOC TX: CPT | Performed by: PHYSICIAN ASSISTANT

## 2020-07-15 PROCEDURE — 2022F DILAT RTA XM EVC RTNOPTHY: CPT | Performed by: PHYSICIAN ASSISTANT

## 2020-07-15 RX ORDER — CANAGLIFLOZIN 100 MG/1
100 TABLET, FILM COATED ORAL
COMMUNITY
Start: 2020-07-10

## 2020-07-15 NOTE — PROGRESS NOTES
Tyra Kruse  1970    S:  44 y o  female here for a problem visit  She is s/p endometrial ablation in 2013 and has been amenorrheic since  She now complains of pink discharge for a few days when she wiped about a month ago, and she has had no bleeding since  She says she always has cramping but this is not new for her  She says she always has hot flashes and vaginal dryness  She is not sexually active - , no partner  She believes herself to be postmenopausal because she does not feel herself ovulate  Past Medical History:   Diagnosis Date    [facility] disorder ([facility] )     Colitis     Diabetes mellitus ([facility] )     Diabetic gastroparesis ([place] [address] ) 7/11/2016    HLD (hyperlipidemia)      Family History   Problem Relation Age of Onset    Lung cancer Mother     Heart attack Father     Heart failure Father     Colon cancer Maternal Uncle     Diabetes Maternal Grandmother     Heart attack Maternal Grandmother     Lung cancer Paternal Grandmother     Heart disease Paternal Grandfather     Cancer Maternal Uncle     Heart attack Maternal Uncle      Social History     Socioeconomic History    Marital status:       Spouse name: None    Number of children: None    Years of education: None    Highest education level: None   Occupational History    None   Social Needs    Financial resource strain: None    Food insecurity:     Worry: None     Inability: None    Transportation needs:     Medical: None     Non-medical: None   Tobacco Use    Smoking status: Current Every Day Smoker     Packs/day: 1 00     Years: 35 00     Pack years: 35 00     Types: Cigarettes     Start date: 9/14/1983    Smokeless tobacco: Never Used   Substance and Sexual Activity    Alcohol use: Yes     Frequency: Monthly or less     Drinks per session: 5 or 6     Binge frequency: Less than monthly     Comment: occasionally    Drug use: No    Sexual activity: Not Currently   Lifestyle    Physical activity: Days per week: 0 days     Minutes per session: 0 min    Stress: Very much   Relationships    Social connections:     Talks on phone: None     Gets together: None     Attends Hindu service: None     Active member of club or organization: None     Attends meetings of clubs or organizations: None     Relationship status: None    Intimate partner violence:     Fear of current or ex partner: None     Emotionally abused: None     Physically abused: None     Forced sexual activity: None   Other Topics Concern    None   Social History Narrative    None       Review of Systems   Respiratory: Negative  Cardiovascular: Negative  Gastrointestinal: Negative for constipation and diarrhea  Genitourinary: Negative for difficulty urinating, pelvic pain, vaginal bleeding, vaginal discharge, itching or odor  O:  /62 (BP Location: Right arm, Patient Position: Sitting, Cuff Size: Standard)   Temp 98 3 °F (36 8 °C) (Tympanic)   She appears well and is in no distress  Abdomen is soft and nontender  External genitals are normal without lesions or rashes  Vagina is normal, no discharge or bleeding noted  Cervix is normal, no lesions or discharge  Uterus is nontender, no masses  Adnexa are nontender, no pelvic masses appreciated    PROCEDURE: ENDOMETRIAL BIOPSY    Speculum placed in the vagina without complication  Cervix visualized, swabbed with Betadine  Pipelle introduced through the cervical os  Uterus sounds to 7cm  Scant amount of tissue obtained  There were no complications  She tolerated the procedure well with moderate discomfort  A/P: Vaginal bleeding, pelvic cramping  Unclear whether or not this is postmenopausal bleeding or not at this point  Will check FSH, estradiol, endometrial biopsy, pelvic ultrasound  .

## 2020-07-20 ENCOUNTER — TELEPHONE (OUTPATIENT)
Dept: OBGYN CLINIC | Facility: CLINIC | Age: 50
End: 2020-07-20

## 2020-07-20 NOTE — TELEPHONE ENCOUNTER
Pt contacted  # [phone] vm- per hippa communication consent on file- lmom advising pt as directed  With call back number should she have any further questions or concerns

## 2020-07-20 NOTE — TELEPHONE ENCOUNTER
----- Message from Chantal Oliver PA-C sent at 7/20/2020  2:01 PM EDT -----  Please let Miguel Jameskiana know that her endometrial biopsy showed no tissue  I am awaiting the results of her labs and her ultrasound to see what the next step is

## 2020-07-28 ENCOUNTER — OFFICE VISIT (OUTPATIENT)
Dept: GASTROENTEROLOGY | Facility: CLINIC | Age: 50
End: 2020-07-28
Payer: COMMERCIAL

## 2020-07-28 ENCOUNTER — HOSPITAL ENCOUNTER (OUTPATIENT)
Dept: ULTRASOUND IMAGING | Facility: CLINIC | Age: 50
Discharge: HOME | End: 2020-07-28
Payer: COMMERCIAL

## 2020-07-28 VITALS
SYSTOLIC BLOOD PRESSURE: 120 MMHG | BODY MASS INDEX: 30.07 KG/M2 | DIASTOLIC BLOOD PRESSURE: 82 MMHG | HEIGHT: 62 IN | WEIGHT: 163.4 LBS | HEART RATE: 78 BPM | TEMPERATURE: 97.4 F

## 2020-07-28 DIAGNOSIS — Z86.010 ENCOUNTER FOR COLONOSCOPY DUE TO HISTORY OF ADENOMATOUS COLONIC POLYPS: ICD-10-CM

## 2020-07-28 DIAGNOSIS — Z20.822 ENCOUNTER FOR LABORATORY TESTING FOR COVID-19 VIRUS: ICD-10-CM

## 2020-07-28 DIAGNOSIS — N95.0 POSTMENOPAUSAL BLEEDING: ICD-10-CM

## 2020-07-28 DIAGNOSIS — Z12.11 ENCOUNTER FOR COLONOSCOPY DUE TO HISTORY OF ADENOMATOUS COLONIC POLYPS: ICD-10-CM

## 2020-07-28 DIAGNOSIS — K58.0 IRRITABLE BOWEL SYNDROME WITH DIARRHEA: Primary | ICD-10-CM

## 2020-07-28 DIAGNOSIS — K21.9 CHRONIC GASTROESOPHAGEAL REFLUX DISEASE: ICD-10-CM

## 2020-07-28 PROBLEM — Z86.0101 ENCOUNTER FOR COLONOSCOPY DUE TO HISTORY OF ADENOMATOUS COLONIC POLYPS: Status: ACTIVE | Noted: 2020-07-28

## 2020-07-28 PROCEDURE — 99214 OFFICE O/P EST MOD 30 MIN: CPT | Performed by: PHYSICIAN ASSISTANT

## 2020-07-28 PROCEDURE — 76830 TRANSVAGINAL US NON-OB: CPT

## 2020-07-28 PROCEDURE — 2022F DILAT RTA XM EVC RTNOPTHY: CPT | Performed by: PHYSICIAN ASSISTANT

## 2020-07-28 PROCEDURE — 4004F PT TOBACCO SCREEN RCVD TLK: CPT | Performed by: PHYSICIAN ASSISTANT

## 2020-07-28 PROCEDURE — 3066F NEPHROPATHY DOC TX: CPT | Performed by: PHYSICIAN ASSISTANT

## 2020-07-28 PROCEDURE — 3008F BODY MASS INDEX DOCD: CPT | Performed by: PHYSICIAN ASSISTANT

## 2020-07-28 PROCEDURE — 3008F BODY MASS INDEX DOCD: CPT | Performed by: INTERNAL MEDICINE

## 2020-07-28 PROCEDURE — 76856 US EXAM PELVIC COMPLETE: CPT

## 2020-07-28 NOTE — PROGRESS NOTES
[facility] - Outpatient Follow-up Note  Paula Erickson 44 y o  female MRN: [id number]  Encounter: [id number]          ASSESSMENT AND PLAN:      1  Chronic gastroesophageal reflux disease  2  Irritable bowel syndrome with diarrhea  3  Personal history of polyps  - We will plan for an EGD to reassess her GERD given she is having some breakthrough symptoms on nexium; we discussed how gastroparesis can contribute to GERD  - She does not want to try or discuss any treatments for IBS-D as she reports nothing has worked for her in the past; depending on her colonoscopy findings could consider a course of xifaxan as it has been several years since trying any treatment for IBS  - Schedule colonoscopy with miralax prep for surveillance of her history of polyps  - We are going to test her for COVID prior to her colonoscopy since she is travelling to [facility] briefly over the next 48-72 hours  - Further recommendations pending EGD/colonoscopy findings    ______________________________________________________________________    SUBJECTIVE:  Clarita Keene is a 45 yo F with a PMH of GERD, gastroparesis, IBS-D, hypothyroidism, presenting to schedule colonoscopy since she is due  Her last colonoscopy was in 2015 and she has a history of polyps  She has no family history of colon cancer in her immediate family but a great uncle had colon cancer  She has not have any changes in her bowel habits  She does have abnormal bowel habits with alternating constipation and diarrhea  She predominantly has diarrhea and  can go up to 7 times in a day  There is no blood in the stool  She reports this has been stable for years and has not improved with any treatment so she does not really 1 a try any new medications  She does not really remember what medication she has tried  The Nexium she is on controls her reflux symptoms with minimal breakthrough symptoms    She also has gastroparesis but really denies any nausea or vomiting  She does not get early satiety  She had an endoscopy in   She is traveling to [facility] tomorrow briefly to sign paperwork on a house she is closing on and is returning the same day  REVIEW OF SYSTEMS IS OTHERWISE NEGATIVE        Historical Information   Past Medical History:   Diagnosis Date    B[facility] 1 disorder ([facility] )     Colitis     Diabetes mellitus ([facility] )     Diabetic gastroparesis ([place] [address] ) 2016    HLD (hyperlipidemia)      Past Surgical History:   Procedure Laterality Date    APPENDECTOMY       SECTION      CHOLECYSTECTOMY      HERNIA REPAIR      HYSTEROSCOPY W/ ENDOMETRIAL ABLATION  2015    Last assessed    UTERINE FIBROID SURGERY      WISDOM TOOTH EXTRACTION       Social History   Social History     Substance and Sexual Activity   Alcohol Use Yes    Frequency: Monthly or less    Drinks per session: 5 or 6    Binge frequency: Less than monthly    Comment: occasionally     Social History     Substance and Sexual Activity   Drug Use No     Social History     Tobacco Use   Smoking Status Current Every Day Smoker    Packs/day: 1 00    Years: 35 00    Pack years: 35 00    Types: Cigarettes    Start date: 1983   Smokeless Tobacco Never Used     Family History   Problem Relation Age of Onset    Lung cancer Mother     Heart attack Father     Heart failure Father     Colon cancer Maternal Uncle     Diabetes Maternal Grandmother     Heart attack Maternal Grandmother     Lung cancer Paternal Grandmother     Heart disease Paternal Grandfather     Cancer Maternal Uncle     Heart attack Maternal Uncle        Meds/Allergies       Current Outpatient Medications:     ALPRAZolam (XANAX) 2 MG tablet    DULoxetine (CYMBALTA) 60 mg delayed release capsule    esomeprazole (NexIUM) 40 MG capsule    glimepiride (AMARYL) 1 mg tablet    INVOKANA 100 MG    metFORMIN (FORTAMET) 1000 MG (OSM) 24 hr tablet    simvastatin (ZOCOR) 40 mg tablet   SYNTHROID 50 MCG tablet    No Known Allergies        Objective     Blood pressure 120/82, pulse 78, temperature (!) 97 4 °F (36 3 °C), height 5' 1 75" (1 568 m), weight 74 1 kg (163 lb 6 4 oz)  Body mass index is 30 13 kg/m²  PHYSICAL EXAM:      General Appearance:   Alert, cooperative, no distress   HEENT:   Normocephalic, atraumatic, anicteric      Neck:  Supple, symmetrical, trachea midline   Lungs:   Clear to auscultation bilaterally; no rales, rhonchi or wheezing; respirations unlabored    Heart[de-identified]   Regular rate and rhythm; no murmur, rub, or gallop  Abdomen:   Soft, non-tender, non-distended; normal bowel sounds; no masses, no organomegaly    Genitalia:   Deferred    Rectal:   Deferred    Extremities:  No cyanosis, clubbing or edema    Pulses:  2+ and symmetric    Skin:  No jaundice, rashes, or lesions    Lymph nodes:  No palpable cervical lymphadenopathy        Lab Results:   No visits with results within 1 Day(s) from this visit  Latest known visit with results is:   Office Visit on 07/15/2020   Component Date Value    Case Report 07/15/2020                      Value:Surgical Pathology Report                         Case: [id number]                                   Authorizing Provider:  Oumar Thorne PA-C         Collected:           07/15/2020 8826              Ordering Location:     [facility]      Received:            07/15/2020 [address] [facility]                                     [facility]                                                                               [place]                                                                    Pathologist:           Ivon Carl MD                                                                  Specimen:    Endometrium, EMB                                                                           Final Diagnosis 07/15/2020                      Value: This result contains rich text formatting which cannot be displayed here      Additional Information 07/15/2020                      Value: This result contains rich text formatting which cannot be displayed here  Sheree Avila Gross Description 07/15/2020                      Value: This result contains rich text formatting which cannot be displayed here  Radiology Results:   No results found

## 2020-07-28 NOTE — H&P (VIEW-ONLY)
[facility] - Outpatient Follow-up Note  Jaison Hernandez 44 y o  female MRN: [id number]  Encounter: [id number]          ASSESSMENT AND PLAN:      1  Chronic gastroesophageal reflux disease  2  Irritable bowel syndrome with diarrhea  3  Personal history of polyps  - We will plan for an EGD to reassess her GERD given she is having some breakthrough symptoms on nexium; we discussed how gastroparesis can contribute to GERD  - She does not want to try or discuss any treatments for IBS-D as she reports nothing has worked for her in the past; depending on her colonoscopy findings could consider a course of xifaxan as it has been several years since trying any treatment for IBS  - Schedule colonoscopy with miralax prep for surveillance of her history of polyps  - We are going to test her for COVID prior to her colonoscopy since she is travelling to [facility] briefly over the next 48-72 hours  - Further recommendations pending EGD/colonoscopy findings    ______________________________________________________________________    SUBJECTIVE:  Cushing is a 43 yo F with a PMH of GERD, gastroparesis, IBS-D, hypothyroidism, presenting to schedule colonoscopy since she is due  Her last colonoscopy was in 2015 and she has a history of polyps  She has no family history of colon cancer in her immediate family but a great uncle had colon cancer  She has not have any changes in her bowel habits  She does have abnormal bowel habits with alternating constipation and diarrhea  She predominantly has diarrhea and  can go up to 7 times in a day  There is no blood in the stool  She reports this has been stable for years and has not improved with any treatment so she does not really 1 a try any new medications  She does not really remember what medication she has tried  The Nexium she is on controls her reflux symptoms with minimal breakthrough symptoms    She also has gastroparesis but really denies any nausea or vomiting  She does not get early satiety  She had an endoscopy in   She is traveling to [place] tomorrow briefly to sign paperwork on a house she is closing on and is returning the same day  REVIEW OF SYSTEMS IS OTHERWISE NEGATIVE        Historical Information   Past Medical History:   Diagnosis Date    Bipolar 1 disorder ([facility] )     Colitis     Diabetes mellitus ([facility] )     Diabetic gastroparesis ([facility] ) 2016    HLD (hyperlipidemia)      Past Surgical History:   Procedure Laterality Date    APPENDECTOMY       SECTION      CHOLECYSTECTOMY      HERNIA REPAIR      HYSTEROSCOPY W/ ENDOMETRIAL ABLATION  2015    Last assessed    UTERINE FIBROID SURGERY      WISDOM TOOTH EXTRACTION       Social History   Social History     Substance and Sexual Activity   Alcohol Use Yes    Frequency: Monthly or less    Drinks per session: 5 or 6    Binge frequency: Less than monthly    Comment: occasionally     Social History     Substance and Sexual Activity   Drug Use No     Social History     Tobacco Use   Smoking Status Current Every Day Smoker    Packs/day: 1 00    Years: 35 00    Pack years: 35 00    Types: Cigarettes    Start date: 1983   Smokeless Tobacco Never Used     Family History   Problem Relation Age of Onset    Lung cancer Mother     Heart attack Father     Heart failure Father     Colon cancer Maternal Uncle     Diabetes Maternal Grandmother     Heart attack Maternal Grandmother     Lung cancer Paternal Grandmother     Heart disease Paternal Grandfather     Cancer Maternal Uncle     Heart attack Maternal Uncle        Meds/Allergies       Current Outpatient Medications:     ALPRAZolam (XANAX) 2 MG tablet    DULoxetine (CYMBALTA) 60 mg delayed release capsule    esomeprazole (NexIUM) 40 MG capsule    glimepiride (AMARYL) 1 mg tablet    INVOKANA 100 MG    metFORMIN (FORTAMET) 1000 MG (OSM) 24 hr tablet    simvastatin (ZOCOR) 40 mg tablet   SYNTHROID 50 MCG tablet    No Known Allergies        Objective     Blood pressure 120/82, pulse 78, temperature (!) 97 4 °F (36 3 °C), height 5' 1 75" (1 568 m), weight 74 1 kg (163 lb 6 4 oz)  Body mass index is 30 13 kg/m²  PHYSICAL EXAM:      General Appearance:   Alert, cooperative, no distress   HEENT:   Normocephalic, atraumatic, anicteric      Neck:  Supple, symmetrical, trachea midline   Lungs:   Clear to auscultation bilaterally; no rales, rhonchi or wheezing; respirations unlabored    Heart[de-identified]   Regular rate and rhythm; no murmur, rub, or gallop  Abdomen:   Soft, non-tender, non-distended; normal bowel sounds; no masses, no organomegaly    Genitalia:   Deferred    Rectal:   Deferred    Extremities:  No cyanosis, clubbing or edema    Pulses:  2+ and symmetric    Skin:  No jaundice, rashes, or lesions    Lymph nodes:  No palpable cervical lymphadenopathy        Lab Results:   No visits with results within 1 Day(s) from this visit  Latest known visit with results is:   Office Visit on 07/15/2020   Component Date Value    Case Report 07/15/2020                      Value:Surgical Pathology Report                         Case: [id number]                                   Authorizing Provider:  Anastasia Camarillo PA-C         Collected:           07/15/2020 4926              Ordering Location:     [facility]      Received:            07/15/2020 [address] [facility]                                     [facility]                                                                               [place]                                                                    Pathologist:           Luz Burnett MD                                                                  Specimen:    Endometrium, EMB                                                                           Final Diagnosis 07/15/2020                      Value: This result contains rich text formatting which cannot be displayed here      Additional Information 07/15/2020                      Value: This result contains rich text formatting which cannot be displayed here  Cindy Hernandez Gross Description 07/15/2020                      Value: This result contains rich text formatting which cannot be displayed here  Radiology Results:   No results found

## 2020-08-10 ENCOUNTER — TELEPHONE (OUTPATIENT)
Dept: INTERNAL MEDICINE CLINIC | Facility: CLINIC | Age: 50
End: 2020-08-10

## 2020-08-11 ENCOUNTER — OFFICE VISIT (OUTPATIENT)
Dept: INTERNAL MEDICINE CLINIC | Facility: CLINIC | Age: 50
End: 2020-08-11
Payer: COMMERCIAL

## 2020-08-11 VITALS
WEIGHT: 161.4 LBS | SYSTOLIC BLOOD PRESSURE: 122 MMHG | OXYGEN SATURATION: 98 % | TEMPERATURE: 98.1 F | HEART RATE: 85 BPM | BODY MASS INDEX: 29.76 KG/M2 | DIASTOLIC BLOOD PRESSURE: 74 MMHG

## 2020-08-11 DIAGNOSIS — E78.2 MIXED HYPERLIPIDEMIA: ICD-10-CM

## 2020-08-11 DIAGNOSIS — E04.2 NONTOXIC MULTINODULAR GOITER: ICD-10-CM

## 2020-08-11 DIAGNOSIS — F31.76 BIPOLAR DISORDER, IN FULL REMISSION, MOST RECENT EPISODE DEPRESSED (HCC): ICD-10-CM

## 2020-08-11 DIAGNOSIS — E03.9 HYPOTHYROIDISM (ACQUIRED): ICD-10-CM

## 2020-08-11 DIAGNOSIS — E11.9 TYPE 2 DIABETES MELLITUS WITHOUT COMPLICATION, WITHOUT LONG-TERM CURRENT USE OF INSULIN (HCC): Primary | Chronic | ICD-10-CM

## 2020-08-11 DIAGNOSIS — Z12.31 SCREENING MAMMOGRAM, ENCOUNTER FOR: ICD-10-CM

## 2020-08-11 PROBLEM — D35.00 ADRENAL ADENOMA: Status: RESOLVED | Noted: 2019-05-01 | Resolved: 2020-08-11

## 2020-08-11 PROBLEM — D35.00 ADRENAL ADENOMA: Status: ACTIVE | Noted: 2019-05-01

## 2020-08-11 PROCEDURE — 4004F PT TOBACCO SCREEN RCVD TLK: CPT | Performed by: INTERNAL MEDICINE

## 2020-08-11 PROCEDURE — 2022F DILAT RTA XM EVC RTNOPTHY: CPT | Performed by: INTERNAL MEDICINE

## 2020-08-11 PROCEDURE — 99214 OFFICE O/P EST MOD 30 MIN: CPT | Performed by: INTERNAL MEDICINE

## 2020-08-11 PROCEDURE — 3066F NEPHROPATHY DOC TX: CPT | Performed by: INTERNAL MEDICINE

## 2020-08-11 NOTE — PATIENT INSTRUCTIONS
Type 2 Diabetes in Adults   AMBULATORY CARE:   Type 2 diabetes  is a disease that affects how your body uses glucose (sugar)  Normally, when the blood sugar level increases, the pancreas makes more insulin  Insulin helps move sugar out of the blood so it can be used for energy  Type 2 diabetes develops because either the body cannot make enough insulin, or it cannot use the insulin correctly  After many years, your pancreas may stop making insulin  Common symptoms include the following:   · More hunger or thirst than usual     · Frequent urination     · Weight loss without trying     · Blurred vision  Call 911 if you have any of the following:   · You have any of the following signs of a stroke:      ¨ Numbness or drooping on one side of your face     ¨ Weakness in an arm or leg    ¨ Confusion or difficulty speaking    ¨ Dizziness, a severe headache, or vision loss    · You have any of the following signs of a heart attack:      ¨ Squeezing, pressure, or pain in your chest that lasts longer than 5 minutes or returns    ¨ Discomfort or pain in your back, neck, jaw, stomach, or arm     ¨ Trouble breathing    ¨ Nausea or vomiting    ¨ Lightheadedness or a sudden cold sweat, especially with chest pain or trouble breathing  Seek care immediately if:   · You have severe abdominal pain, or the pain spreads to your back  You may also be vomiting  · You have trouble staying awake or focusing  · You are shaking or sweating  · You have blurred or double vision  · Your breath has a fruity, sweet smell  · Your breathing is deep and labored, or rapid and shallow  · Your heartbeat is fast and weak  Contact your healthcare provider if:   · You are vomiting or have diarrhea  · You have an upset stomach and cannot eat the foods on your meal plan  · You feel weak or more tired than usual      · You feel dizzy, have headaches, or are easily irritated  · Your skin is red, warm, dry, or swollen  · You have a wound that does not heal      · You have numbness in your arms or legs  · You have trouble coping with your illness, or you feel anxious or depressed  · You have questions or concerns about your condition or care  Treatment for type 2 diabetes  includes keeping your blood sugar at a normal level  You must eat the right foods, and exercise regularly  You may need medicine if you cannot control your blood sugar level with nutrition and exercise  You may also need medicine to prevent heart disease, a complication of type 2 diabetes  You may  need any of the following:  · Hypoglycemic medicines or insulin  may be given to decrease the amount of sugar in your blood  · Blood pressure medicine  may be given to lower your blood pressure  Your blood pressure should be less than 140/90  · Cholesterol lowering medicine  may be given to prevent heart disease  · Antiplatelets , such as aspirin, help prevent blood clots  Take your antiplatelet medicine exactly as directed  These medicines make it more likely for you to bleed or bruise  If you are told to take aspirin, do not take acetaminophen or ibuprofen instead  · Take your medicine as directed  Contact your healthcare provider if you think your medicine is not helping or if you have side effects  Tell him or her if you are allergic to any medicine  Keep a list of the medicines, vitamins, and herbs you take  Include the amounts, and when and why you take them  Bring the list or the pill bottles to follow-up visits  Carry your medicine list with you in case of an emergency  Check your blood sugar level: You will be taught how to check a small drop of blood in a glucose monitor  You will need to check your blood sugar level at least 3 times each day if you are on insulin  Ask your healthcare provider when and how often to check during the day  If you check your blood sugar level before a meal , it should be between 80 and 130 mg/dL   If you check your blood sugar level 1 to 2 hours after a meal , it should be less than 180 mg/dL  Ask your healthcare provider if these are good goals for you  Write down your results, and show them to your healthcare provider  He may use the results to make changes to your medicine, food, and exercise schedules  If your blood sugar level is too low: Your blood sugar level is too low if it goes below 70 mg/dL  If the level is too low, eat or drink 15 grams of fast-acting carbohydrate  These are found naturally in fruits  Fast-acting carbohydrates will raise your blood sugar level quickly  Examples of 15 grams of fast-acting carbohydrate are 4 ounces (½ cup) of fruit juice or 4 ounces of regular soda  Other examples are 2 tablespoons of raisins or 3 to 4 glucose tablets  Check your blood sugar level 15 minutes later  If the level is still low (less than 100 mg/dL), eat another 15 grams of carbohydrate  When the level returns to 100 mg/dL, eat a snack or meal that contains carbohydrates  This will help prevent another drop in blood sugar  Always carefully follow your healthcare provider's instructions on how to treat low blood sugar levels  Check your feet each day for sores:  Wear shoes and socks that fit correctly  Do not trim your toenails  Ask your healthcare provider for more information about foot care  Follow your meal plan:  A dietitian will help you make a meal plan to keep your blood sugar level steady  Do not skip meals  Your blood sugar level may drop too low if you have taken diabetes medicine and do not eat  · Keep track of carbohydrates (sugar and starchy foods)  Your blood sugar level can get too high if you eat too many carbohydrates  Your dietitian will help you plan meals and snacks that have the right amount of carbohydrates  · Eat low-fat foods , such as skinless chicken and low-fat milk  · Eat less sodium (salt)    Limit high-sodium foods, such as soy sauce, potato chips, and soup  Do not add salt to food you cook  Limit your use of table salt  You should have less than 2,300 mg of sodium per day  · Eat high-fiber foods , such as vegetables, whole grain breads, and beans  · Limit alcohol  Alcohol affects your blood sugar level and can make it harder to manage your diabetes  Limit alcohol to 1 drink a day if you are a woman  Limit alcohol to 2 drinks a day if you are a man  A drink of alcohol is 12 ounces of beer, 5 ounces of wine, or 1½ ounces of liquor  Maintain a healthy weight:  Ask your healthcare provider how much you should weigh  A healthy weight can help you control your diabetes  Ask your provider to help you create a weight loss plan if you are overweight  Together you can set manageable weight loss goals  Exercise as directed:  Exercise can help keep your blood sugar level steady, decrease your risk of heart disease, and help you lose weight  Stretch before and after you exercise  Exercise for at least 150 minutes every week  Spread this amount of exercise over at least 3 days a week  Do not skip exercise more than 2 days in a row  Include muscle strengthening activities 2 to 3 days each week  Older adults should include balance training 2 to 3 times each week  Activities that help increase balance include yoga and adrien chi  Work with your healthcare provider to create an exercise plan  · Check your blood sugar level before and after exercise  Healthcare providers may tell you to change the amount of insulin you take or food you eat  If your blood sugar level is high, check your blood or urine for ketones before you exercise  Do not exercise if your blood sugar level is high and you have ketones  · If your blood sugar level is less than 100 mg/dL, have a carbohydrate snack before you exercise  Examples are 4 to 6 crackers, ½ banana, 8 ounces (1 cup) of milk, or 4 ounces (½ cup) of juice   Drink water or liquids that do not contain sugar before, during, and after exercise  Ask your dietitian or healthcare provider which liquids you should drink when you exercise  · Do not sit for longer than 30 minutes  If you cannot walk around, at least stand up  This will help you stay active and keep your blood circulating  Do not smoke:  Nicotine and other chemicals in cigarettes and cigars can cause lung damage and make it more difficult to manage your diabetes  Ask your healthcare provider for information if you currently smoke and need help to quit  Do not use e-cigarettes or smokeless tobacco in place of cigarettes or to help you quit  They still contain nicotine  Check your blood pressure as directed:  Ask your healthcare provider what your blood pressure should be  Most adults with diabetes and high blood pressure should have a systolic blood pressure (first number) less than 140  Your diastolic blood pressure (second number) should be less than 90  Wear medical alert identification:  Wear medical alert jewelry or carry a card that says you have diabetes  Ask your healthcare provider where to get these items  Ask about vaccines: You have a higher risk for serious illness if you get the flu, pneumonia, or hepatitis  Ask your healthcare provider if you should get a flu, pneumonia, or hepatitis B vaccine, and when to get the vaccine  Follow up with your healthcare provider as directed: You may need to return to have your A1c checked every 3 months  You will need to return at least once each year to have your feet checked  You will need an eye exam once a year to check for retinopathy  You will also need urine tests every year to check for kidney problems  You may need tests to monitor for heart disease such as an EKG, stress test, blood pressure monitoring, and blood tests  Write down your questions so you remember to ask them during your visits     © 2017 [facility] Information is for End User's use only and may not be sold, redistributed or otherwise used for commercial purposes  All illustrations and images included in CareNotes® are the copyrighted property of A D A M [employer] Inc  or [employer]ke[employer]  The above information is an  only  It is not intended as medical advice for individual conditions or treatments  Talk to your doctor, nurse or pharmacist before following any medical regimen to see if it is safe and effective for you

## 2020-08-11 NOTE — PROGRESS NOTES
Vern    NAME: Yue Garcia  AGE: 44 y o  SEX: female  : 1970     DATE: 2020     Assessment and Plan:     1  Type 2 diabetes mellitus without complication, without long-term current use of insulin ([facility] )    Most recent A1c was 8 6 % on 2020  Medication management by endocrinology  She has an upcoming visit in September and has labs to be done before that time  2  Hypothyroidism (acquired)    Last TSH was normal   Continue low-dose Synthroid  3  Nontoxic multinodular goiter    Recent thyroid biopsy was reviewed and showed benign follicular cells  4  Mixed hyperlipidemia    Continue simvastatin as prescribed  Triglycerides could be a little bit better  This could improve with diet and lifestyle changes  5  Bipolar disorder, in full remission, most recent episode depressed ([facility] )    Her mental health has been stable  Continue duloxetine as prescribed  6  Screening mammogram, encounter for  - Mammo screening bilateral w 3d & cad; Future    BMI Counseling: Body mass index is 29 76 kg/m²  The BMI is above normal  Nutrition recommendations include decreasing portion sizes, encouraging healthy choices of fruits and vegetables, limiting drinks that contain sugar, moderation in carbohydrate intake and increasing intake of lean protein  Exercise recommendations include exercising 3-5 times per week  Return in about 4 months (around 2020) for Subsequent AWV  Chief Complaint:     Chief Complaint   Patient presents with    Follow-up        History of Present Illness:     Patient presents for follow-up  Has been following with endocrinology due to diabetes, hypothyroidism, and multinodular goiter  Recent thyroid biopsy showed benign follicular cells  Diabetes had been poorly controlled when labs last done in April  Most recent A1c was 8 6 % on 2020   She had a telemedicine visit with fadia and she was started on endo  She is doing better she believes monitoring her blood sugars  Still could do better with diet  Review of Systems:     Review of Systems   Constitutional: Negative for activity change, appetite change and fatigue  Respiratory: Negative for apnea, cough, chest tightness, shortness of breath and wheezing  Cardiovascular: Negative for chest pain, palpitations and leg swelling  Gastrointestinal: Negative for abdominal distention, abdominal pain, blood in stool, constipation, diarrhea, nausea and vomiting  Neurological: Negative for dizziness, weakness, light-headedness, numbness and headaches  Psychiatric/Behavioral: Negative for behavioral problems, confusion, hallucinations, sleep disturbance and suicidal ideas  The patient is not nervous/anxious  Objective:     /74 (BP Location: Left arm, Patient Position: Sitting, Cuff Size: Standard)   Pulse 85   Temp 98 1 °F (36 7 °C) (Temporal) Comment: NO NSAIDS  Wt 73 2 kg (161 lb 6 4 oz) Comment: w/ shoes denied off  SpO2 98%   BMI 29 76 kg/m²     Physical Exam  Vitals signs reviewed  Constitutional:       General: She is not in acute distress  Appearance: She is well-developed  She is not diaphoretic  Eyes:      General: No scleral icterus  Right eye: No discharge  Left eye: No discharge  Conjunctiva/sclera: Conjunctivae normal    Neck:      Musculoskeletal: Neck supple  Thyroid: No thyromegaly  Vascular: No JVD  Cardiovascular:      Rate and Rhythm: Normal rate and regular rhythm  Pulses: no weak pulses          Dorsalis pedis pulses are 2+ on the right side and 2+ on the left side  Posterior tibial pulses are 2+ on the right side and 2+ on the left side  Heart sounds: Normal heart sounds  No murmur  Pulmonary:      Effort: Pulmonary effort is normal  No respiratory distress  Breath sounds: Normal breath sounds  No wheezing or rales     Abdominal:      General: Bowel sounds are normal  There is no distension  Palpations: Abdomen is soft  Tenderness: There is no abdominal tenderness  Feet:      Right foot:      Skin integrity: No ulcer, skin breakdown, erythema, warmth, callus or dry skin  Left foot:      Skin integrity: No ulcer, skin breakdown, erythema, warmth, callus or dry skin  Lymphadenopathy:      Cervical: No cervical adenopathy  Skin:     General: Skin is warm and dry  Neurological:      Mental Status: She is alert and oriented to person, place, and time  Psychiatric:         Behavior: Behavior normal      Diabetic Foot Exam  Patient's shoes and socks removed  Right Foot/Ankle   Right Foot Inspection  Skin Exam: skin normal and skin intact no dry skin, no warmth, no callus, no erythema, no maceration, no abnormal color, no pre-ulcer, no ulcer and no callus                          Toe Exam: ROM and strength within normal limits  Sensory     Proprioception: intact   Monofilament testing: intact  Vascular  Capillary refills: < 3 seconds  The right DP pulse is 2+  The right PT pulse is 2+  Left Foot/Ankle  Left Foot Inspection  Skin Exam: skin normal and skin intactno dry skin, no warmth, no erythema, no maceration, normal color, no pre-ulcer, no ulcer and no callus                         Toe Exam: ROM and strength within normal limits                   Sensory     Proprioception: intact  Monofilament: intact  Vascular  Capillary refills: < 3 seconds  The left DP pulse is 2+  The left PT pulse is 2+  Assign Risk Category:  No deformity present; No loss of protective sensation;  No weak pulses       Risk: 0         Kai Eckert DO  MEDICAL [id number]

## 2020-08-13 LAB
LEFT EYE DIABETIC RETINOPATHY: NORMAL
RIGHT EYE DIABETIC RETINOPATHY: NORMAL

## 2020-08-14 DIAGNOSIS — Z20.822 ENCOUNTER FOR LABORATORY TESTING FOR COVID-19 VIRUS: ICD-10-CM

## 2020-08-14 PROCEDURE — U0003 INFECTIOUS AGENT DETECTION BY NUCLEIC ACID (DNA OR RNA); SEVERE ACUTE RESPIRATORY SYNDROME CORONAVIRUS 2 (SARS-COV-2) (CORONAVIRUS DISEASE [COVID-19]), AMPLIFIED PROBE TECHNIQUE, MAKING USE OF HIGH THROUGHPUT TECHNOLOGIES AS DESCRIBED BY CMS-2020-01-R: HCPCS

## 2020-08-15 LAB — SARS-COV-2 RNA SPEC QL NAA+PROBE: NOT DETECTED

## 2020-08-24 ENCOUNTER — HOSPITAL ENCOUNTER (OUTPATIENT)
Dept: GASTROENTEROLOGY | Facility: HOSPITAL | Age: 50
Setting detail: OUTPATIENT SURGERY
Discharge: HOME | End: 2020-08-24
Attending: INTERNAL MEDICINE | Admitting: INTERNAL MEDICINE
Payer: COMMERCIAL

## 2020-08-24 ENCOUNTER — ANESTHESIA (OUTPATIENT)
Dept: GASTROENTEROLOGY | Facility: HOSPITAL | Age: 50
End: 2020-08-24

## 2020-08-24 ENCOUNTER — ANESTHESIA EVENT (OUTPATIENT)
Dept: GASTROENTEROLOGY | Facility: HOSPITAL | Age: 50
End: 2020-08-24

## 2020-08-24 VITALS
HEART RATE: 67 BPM | RESPIRATION RATE: 20 BRPM | TEMPERATURE: 98.5 F | OXYGEN SATURATION: 98 % | HEIGHT: 61 IN | DIASTOLIC BLOOD PRESSURE: 76 MMHG | BODY MASS INDEX: 29.76 KG/M2 | SYSTOLIC BLOOD PRESSURE: 126 MMHG | WEIGHT: 157.63 LBS

## 2020-08-24 DIAGNOSIS — K58.0 IRRITABLE BOWEL SYNDROME WITH DIARRHEA: ICD-10-CM

## 2020-08-24 DIAGNOSIS — K21.9 CHRONIC GASTROESOPHAGEAL REFLUX DISEASE: ICD-10-CM

## 2020-08-24 LAB — GLUCOSE SERPL-MCNC: 138 MG/DL (ref 65–140)

## 2020-08-24 PROCEDURE — 82948 REAGENT STRIP/BLOOD GLUCOSE: CPT

## 2020-08-24 PROCEDURE — 43239 EGD BIOPSY SINGLE/MULTIPLE: CPT | Performed by: INTERNAL MEDICINE

## 2020-08-24 PROCEDURE — 88305 TISSUE EXAM BY PATHOLOGIST: CPT | Performed by: PATHOLOGY

## 2020-08-24 PROCEDURE — 45385 COLONOSCOPY W/LESION REMOVAL: CPT | Performed by: INTERNAL MEDICINE

## 2020-08-24 PROCEDURE — 45380 COLONOSCOPY AND BIOPSY: CPT | Performed by: INTERNAL MEDICINE

## 2020-08-24 RX ORDER — SODIUM CHLORIDE, SODIUM LACTATE, POTASSIUM CHLORIDE, CALCIUM CHLORIDE 600; 310; 30; 20 MG/100ML; MG/100ML; MG/100ML; MG/100ML
75 INJECTION, SOLUTION INTRAVENOUS CONTINUOUS
Status: DISCONTINUED | OUTPATIENT
Start: 2020-08-24 | End: 2020-08-28 | Stop reason: HOSPADM

## 2020-08-24 RX ORDER — PROPOFOL 10 MG/ML
INJECTION, EMULSION INTRAVENOUS AS NEEDED
Status: DISCONTINUED | OUTPATIENT
Start: 2020-08-24 | End: 2020-08-24

## 2020-08-24 RX ORDER — LIDOCAINE HYDROCHLORIDE 10 MG/ML
INJECTION, SOLUTION EPIDURAL; INFILTRATION; INTRACAUDAL; PERINEURAL AS NEEDED
Status: DISCONTINUED | OUTPATIENT
Start: 2020-08-24 | End: 2020-08-24

## 2020-08-24 RX ADMIN — PROPOFOL 40 MG: 10 INJECTION, EMULSION INTRAVENOUS at 08:35

## 2020-08-24 RX ADMIN — PROPOFOL 50 MG: 10 INJECTION, EMULSION INTRAVENOUS at 08:44

## 2020-08-24 RX ADMIN — LIDOCAINE HYDROCHLORIDE 100 MG: 10 INJECTION, SOLUTION EPIDURAL; INFILTRATION; INTRACAUDAL; PERINEURAL at 08:31

## 2020-08-24 RX ADMIN — SODIUM CHLORIDE, SODIUM LACTATE, POTASSIUM CHLORIDE, AND CALCIUM CHLORIDE 75 ML/HR: .6; .31; .03; .02 INJECTION, SOLUTION INTRAVENOUS at 07:55

## 2020-08-24 RX ADMIN — PROPOFOL 40 MG: 10 INJECTION, EMULSION INTRAVENOUS at 08:38

## 2020-08-24 RX ADMIN — PROPOFOL 120 MG: 10 INJECTION, EMULSION INTRAVENOUS at 08:31

## 2020-08-24 NOTE — ANESTHESIA PREPROCEDURE EVALUATION
Procedure:  COLONOSCOPY  EGD    Relevant Problems   CARDIO   (+) Mixed hyperlipidemia      ENDO   (+) Hypothyroidism (acquired)   (+) Type 2 diabetes mellitus without complication, without long-term current use of insulin (HCC)      GI/HEPATIC   (+) Chronic gastroesophageal reflux disease      /RENAL   (+) CKD (chronic kidney disease), stage I      NEURO/PSYCH   (+) Encounter for colonoscopy due to history of adenomatous colonic polyps      Other   (+) Bipolar disorder (HCC)   (+) Irritable bowel syndrome with diarrhea        Physical Exam    Airway    Mallampati score: II  TM Distance: >3 FB  Neck ROM: full     Dental   No notable dental hx     Cardiovascular  Rhythm: regular, Rate: normal,     Pulmonary  Breath sounds clear to auscultation,     Other Findings        Anesthesia Plan  ASA Score- 2     Anesthesia Type- IV sedation with anesthesia with ASA Monitors  Additional Monitors:   Airway Plan:           Plan Factors-Exercise tolerance (METS): >4 METS  Chart reviewed  EKG reviewed  Existing labs reviewed  Patient summary reviewed  Patient is not a current smoker  Patient instructed to abstain from smoking on day of procedure  Patient did not smoke on day of surgery  Induction- intravenous  Postoperative Plan-     Informed Consent- Anesthetic plan and risks discussed with patient  I personally reviewed this patient with the CRNA  Discussed and agreed on the Anesthesia Plan with the CRNA  Patrick Mendoza

## 2020-08-24 NOTE — DISCHARGE INSTRUCTIONS

## 2020-08-27 ENCOUNTER — TELEPHONE (OUTPATIENT)
Dept: GASTROENTEROLOGY | Facility: CLINIC | Age: 50
End: 2020-08-27

## 2020-08-27 NOTE — TELEPHONE ENCOUNTER
Spoke with patient advised of biopsy results per Dr Kalin Young previous note  Patient did not have any questions

## 2020-08-27 NOTE — TELEPHONE ENCOUNTER
----- Message from Irvin Og MD sent at 8/27/2020 10:11 AM EDT -----  Please tell her the polyps were precancerous and have a colonoscopy in 3 years  Please tell her the other biopsies were all negative

## 2021-01-20 ENCOUNTER — OFFICE VISIT (OUTPATIENT)
Dept: INTERNAL MEDICINE CLINIC | Facility: CLINIC | Age: 51
End: 2021-01-20
Payer: COMMERCIAL

## 2021-01-20 VITALS
BODY MASS INDEX: 28.92 KG/M2 | SYSTOLIC BLOOD PRESSURE: 124 MMHG | WEIGHT: 163.2 LBS | TEMPERATURE: 98.2 F | OXYGEN SATURATION: 97 % | HEART RATE: 95 BPM | DIASTOLIC BLOOD PRESSURE: 76 MMHG | HEIGHT: 63 IN

## 2021-01-20 DIAGNOSIS — F31.76 BIPOLAR DISORDER, IN FULL REMISSION, MOST RECENT EPISODE DEPRESSED (HCC): ICD-10-CM

## 2021-01-20 DIAGNOSIS — Z00.00 MEDICARE ANNUAL WELLNESS VISIT, SUBSEQUENT: ICD-10-CM

## 2021-01-20 DIAGNOSIS — E03.9 HYPOTHYROIDISM (ACQUIRED): ICD-10-CM

## 2021-01-20 DIAGNOSIS — E78.2 MIXED HYPERLIPIDEMIA: ICD-10-CM

## 2021-01-20 DIAGNOSIS — J98.01 BRONCHOSPASM: ICD-10-CM

## 2021-01-20 DIAGNOSIS — E11.9 TYPE 2 DIABETES MELLITUS WITHOUT COMPLICATION, WITHOUT LONG-TERM CURRENT USE OF INSULIN (HCC): Primary | Chronic | ICD-10-CM

## 2021-01-20 PROCEDURE — 4004F PT TOBACCO SCREEN RCVD TLK: CPT | Performed by: INTERNAL MEDICINE

## 2021-01-20 PROCEDURE — 3008F BODY MASS INDEX DOCD: CPT | Performed by: INTERNAL MEDICINE

## 2021-01-20 PROCEDURE — 99214 OFFICE O/P EST MOD 30 MIN: CPT | Performed by: INTERNAL MEDICINE

## 2021-01-20 PROCEDURE — G0439 PPPS, SUBSEQ VISIT: HCPCS | Performed by: INTERNAL MEDICINE

## 2021-01-20 PROCEDURE — 3725F SCREEN DEPRESSION PERFORMED: CPT | Performed by: INTERNAL MEDICINE

## 2021-01-20 RX ORDER — ALBUTEROL SULFATE 90 UG/1
2 AEROSOL, METERED RESPIRATORY (INHALATION) EVERY 6 HOURS PRN
Qty: 1 INHALER | Refills: 5 | Status: SHIPPED | OUTPATIENT
Start: 2021-01-20 | End: 2021-01-20

## 2021-01-20 RX ORDER — PREDNISONE 20 MG/1
40 TABLET ORAL DAILY
Qty: 10 TABLET | Refills: 0 | Status: SHIPPED | OUTPATIENT
Start: 2021-01-20 | End: 2021-01-25

## 2021-01-20 RX ORDER — ALBUTEROL SULFATE 90 UG/1
2 AEROSOL, METERED RESPIRATORY (INHALATION) EVERY 6 HOURS PRN
Qty: 1 INHALER | Refills: 5 | Status: SHIPPED | OUTPATIENT
Start: 2021-01-20

## 2021-01-20 RX ORDER — PREDNISONE 20 MG/1
40 TABLET ORAL DAILY
Qty: 10 TABLET | Refills: 0 | Status: SHIPPED | OUTPATIENT
Start: 2021-01-20 | End: 2021-01-20

## 2021-01-20 NOTE — PROGRESS NOTES
Assessment and Plan:           Other Visit Diagnoses     Medicare annual wellness visit, subsequent            BMI Counseling: Body mass index is 29 37 kg/m²  The BMI is above normal  Nutrition recommendations include decreasing portion sizes, encouraging healthy choices of fruits and vegetables, limiting drinks that contain sugar, moderation in carbohydrate intake and increasing intake of lean protein  Exercise recommendations include exercising 3-5 times per week  Preventive health issues were discussed with patient, and age appropriate screening tests were ordered as noted in patient's After Visit Summary  Personalized health advice and appropriate referrals for health education or preventive services given if needed, as noted in patient's After Visit Summary       History of Present Illness:     Patient presents for Medicare Annual Wellness visit    Patient Care Team:  Earnest Bustamante DO as PCP - General (Internal Medicine)  Earnest Bustamante DO as PCP - [address] [place] [address] (RTE)  Niko Braun PA-C (Gastroenterology)     Problem List:     Patient Active Problem List   Diagnosis    Chronic gastroesophageal reflux disease    Bipolar disorder ([address] )    Hypothyroidism (acquired)    Irritable bowel syndrome with diarrhea    Mixed hyperlipidemia    Nontoxic multinodular goiter    Type 2 diabetes mellitus without complication, without long-term current use of insulin ([address] )    CKD (chronic kidney disease), stage I    Encounter for colonoscopy due to history of adenomatous colonic polyps      Past Medical and Surgical History:     Past Medical History:   Diagnosis Date    Adrenal adenoma 2019    Bipolar 1 disorder ([address] )     Colitis     Diabetes mellitus ([address] )     Diabetic gastroparesis ([address] ) 2016    HLD (hyperlipidemia)      Past Surgical History:   Procedure Laterality Date    APPENDECTOMY       SECTION      CHOLECYSTECTOMY      HERNIA REPAIR      HYSTEROSCOPY W/ ENDOMETRIAL ABLATION  07/17/2015    Last assessed    UTERINE FIBROID SURGERY      WISDOM TOOTH EXTRACTION        Family History:     Family History   Problem Relation Age of Onset    Lung cancer Mother     Heart attack Father     Heart failure Father     Colon cancer Maternal Uncle     Diabetes Maternal Grandmother     Heart attack Maternal Grandmother     Lung cancer Paternal Grandmother     Heart disease Paternal Grandfather     Cancer Maternal Uncle     Heart attack Maternal Uncle       Social History:     E-Cigarette/Vaping    E-Cigarette Use Never User      E-Cigarette/Vaping Substances    Nicotine No     THC No     CBD No     Flavoring No     Other No     Unknown No      Social History     Socioeconomic History    Marital status:       Spouse name: None    Number of children: None    Years of education: None    Highest education level: None   Occupational History    None   Social Needs    Financial resource strain: None    Food insecurity     Worry: None     Inability: None    Transportation needs     Medical: None     Non-medical: None   Tobacco Use    Smoking status: Current Every Day Smoker     Packs/day: 1 00     Years: 35 00     Pack years: 35 00     Types: Cigarettes     Start date: 9/14/1983    Smokeless tobacco: Never Used   Substance and Sexual Activity    Alcohol use: Yes     Frequency: Monthly or less     Drinks per session: 5 or 6     Binge frequency: Less than monthly     Comment: occasionally    Drug use: No    Sexual activity: Not Currently   Lifestyle    Physical activity     Days per week: 0 days     Minutes per session: 0 min    Stress: Very much   Relationships    Social connections     Talks on phone: None     Gets together: None     Attends [employer] service: None     Active member of club or organization: None     Attends meetings of clubs or organizations: None     Relationship status: None    Intimate partner violence     Fear of current or ex partner: None     Emotionally abused: None     Physically abused: None     Forced sexual activity: None   Other Topics Concern    None   Social History Narrative    None      Medications and Allergies:     Current Outpatient Medications   Medication Sig Dispense Refill    ALPRAZolam (XANAX) 2 MG tablet Take 0 5 mg by mouth as needed        DULoxetine (CYMBALTA) 60 mg delayed release capsule Take 60 mg by mouth daily        esomeprazole (NexIUM) 40 MG capsule TAKE 1 CAPSULE TWICE A  capsule 3    glimepiride (AMARYL) 1 mg tablet Take 1 mg by mouth 2 (two) times a day       INVOKANA 100 MG Take 100 mg by mouth daily before breakfast       metFORMIN (FORTAMET) 1000 MG (OSM) 24 hr tablet Take 500 mg by mouth daily with breakfast       simvastatin (ZOCOR) 40 mg tablet Take 40 mg by mouth daily        SYNTHROID 50 MCG tablet Take 50 mcg by mouth daily         No current facility-administered medications for this visit  No Known Allergies   Immunizations: There is no immunization history on file for this patient  Health Maintenance:         Topic Date Due    HIV Screening  08/31/1983    MAMMOGRAM  03/14/2020    Cervical Cancer Screening  07/17/2020    Colonoscopy Surveillance  08/24/2023    Colorectal Cancer Screening  08/24/2030         Topic Date Due    Pneumococcal Vaccine: Pediatrics (0 to 5 Years) and At-Risk Patients (6 to 59 Years) (1 of 1 - PPSV23) 08/31/1974    DTaP,Tdap,and Td Vaccines (1 - Tdap) 08/31/1989    Influenza Vaccine (1) 09/01/2020      Medicare Health Risk Assessment:     /76 (BP Location: Left arm, Patient Position: Sitting, Cuff Size: Standard)   Pulse 95   Temp 98 2 °F (36 8 °C) (Temporal) Comment: NO NSAIDS  Ht 5' 2 5" (1 588 m)   Wt 74 kg (163 lb 3 2 oz)   SpO2 97%   BMI 29 37 kg/m²      Amish Bassett is here for her Subsequent Wellness visit  Health Risk Assessment:   Patient rates overall health as good   Patient feels that their physical health rating is same  Eyesight was rated as slightly worse  Hearing was rated as same  Patient feels that their emotional and mental health rating is same  Pain experienced in the last 7 days has been some  Patient's pain rating has been 1/10  Patient states that she has experienced no weight loss or gain in last 6 months  Chest pain-discomfort and shoulders (both)    Depression Screening:   PHQ-2 Score: 0      Fall Risk Screening: In the past year, patient has experienced: no history of falling in past year      Urinary Incontinence Screening:   Patient has not leaked urine accidently in the last six months  Home Safety:  Patient does not have trouble with stairs inside or outside of their home  Patient has working smoke alarms and has no working carbon monoxide detector  Home safety hazards include: none  Nutrition:   Current diet is Other (please comment) and Unhealthy  Per pt states "I ate like shit, I eat bad and got worse during this year"    Medications:   Patient is currently taking over-the-counter supplements  OTC medications include: see medication list  Patient is able to manage medications  Activities of Daily Living (ADLs)/Instrumental Activities of Daily Living (IADLs):   Walk and transfer into and out of bed and chair?: Yes  Dress and groom yourself?: Yes    Bathe or shower yourself?: Yes    Feed yourself?  Yes  Do your laundry/housekeeping?: Yes  Manage your money, pay your bills and track your expenses?: Yes  Make your own meals?: Yes    Do your own shopping?: Yes    Durable Medical Equipment Suppliers  none    Previous Hospitalizations:   Any hospitalizations or ED visits within the last 12 months?: No      Advance Care Planning:   Living will: No    Durable POA for healthcare: No    Advanced directive: No    Five wishes given: Yes      Cognitive Screening:   Provider or family/friend/caregiver concerned regarding cognition?: No    PREVENTIVE SCREENINGS      Cardiovascular Screening: General: Screening Not Indicated and History Lipid Disorder      Diabetes Screening:     General: Screening Not Indicated and History Diabetes      Colorectal Cancer Screening:     General: Screening Current      Breast Cancer Screening:     General: Screening Current      Cervical Cancer Screening:      Due for: Vaginal Pap Smear      Osteoporosis Screening:    General: Screening Not Indicated      Abdominal Aortic Aneurysm (AAA) Screening:        General: Screening Not Indicated      Lung Cancer Screening:     General: Screening Not Indicated      Hepatitis C Screening:    General: Screening Not Indicated    Other Counseling Topics:   Car/seat belt/driving safety, skin self-exam, sunscreen and regular weightbearing exercise         Gail Michaud DO

## 2021-01-20 NOTE — PATIENT INSTRUCTIONS
Medicare Preventive Visit Patient Instructions  Thank you for completing your Welcome to Medicare Visit or Medicare Annual Wellness Visit today  Your next wellness visit will be due in one year (1/20/2022)  The screening/preventive services that you may require over the next 5-10 years are detailed below  Some tests may not apply to you based off risk factors and/or age  Screening tests ordered at today's visit but not completed yet may show as past due  Also, please note that scanned in results may not display below  Preventive Screenings:  Service Recommendations Previous Testing/Comments   Colorectal Cancer Screening  * Colonoscopy    * Fecal Occult Blood Test (FOBT)/Fecal Immunochemical Test (FIT)  * Fecal DNA/Cologuard Test  * Flexible Sigmoidoscopy Age: 54-65 years old   Colonoscopy: every 10 years (may be performed more frequently if at higher risk)  OR  FOBT/FIT: every 1 year  OR  Cologuard: every 3 years  OR  Sigmoidoscopy: every 5 years  Screening may be recommended earlier than age 48 if at higher risk for colorectal cancer  Also, an individualized decision between you and your healthcare provider will decide whether screening between the ages of 74-80 would be appropriate  Colonoscopy: 08/24/2020  FOBT/FIT: Not on file  Cologuard: Not on file  Sigmoidoscopy: Not on file    Screening Current     Breast Cancer Screening Age: 36 years old  Frequency: every 1-2 years  Not required if history of left and right mastectomy Mammogram: 03/14/2019    Screening Current   Cervical Cancer Screening Between the ages of 21-29, pap smear recommended once every 3 years  Between the ages of 33-67, can perform pap smear with HPV co-testing every 5 years     Recommendations may differ for women with a history of total hysterectomy, cervical cancer, or abnormal pap smears in past  Pap Smear: 07/17/2015    Follow-up with OB/GYN   Hepatitis C Screening Once for adults born between 1945 and 1965  More frequently in patients at high risk for Hepatitis C Hep C Antibody: Not on file    Screening not indicated     Diabetes Screening 1-2 times per year if you're at risk for diabetes or have pre-diabetes Fasting glucose: No results in last 5 years   A1C: 7 0 %    Screening Not Indicated  History Diabetes   Cholesterol Screening Once every 5 years if you don't have a lipid disorder  May order more often based on risk factors  Lipid panel: 09/23/2020    Screening Not Indicated  History Lipid Disorder     Other Preventive Screenings Covered by Medicare:  1  Abdominal Aortic Aneurysm (AAA) Screening: covered once if your at risk  You're considered to be at risk if you have a family history of AAA  2  Lung Cancer Screening: covers low dose CT scan once per year if you meet all of the following conditions: (1) Age 50-69; (2) No signs or symptoms of lung cancer; (3) Current smoker or have quit smoking within the last 15 years; (4) You have a tobacco smoking history of at least 30 pack years (packs per day multiplied by number of years you smoked); (5) You get a written order from a healthcare provider  3  Glaucoma Screening: covered annually if you're considered high risk: (1) You have diabetes OR (2) Family history of glaucoma OR (3)  aged 48 and older OR (3)  American aged 72 and older  3  Osteoporosis Screening: covered every 2 years if you meet one of the following conditions: (1) You're estrogen deficient and at risk for osteoporosis based off medical history and other findings; (2) Have a vertebral abnormality; (3) On glucocorticoid therapy for more than 3 months; (4) Have primary hyperparathyroidism; (5) On osteoporosis medications and need to assess response to drug therapy  · Last bone density test (DXA Scan): Not on file  5  HIV Screening: covered annually if you're between the age of 12-76  Also covered annually if you are younger than 13 and older than 72 with risk factors for HIV infection   For pregnant patients, it is covered up to 3 times per pregnancy  Immunizations:  Immunization Recommendations   Influenza Vaccine Annual influenza vaccination during flu season is recommended for all persons aged >= 6 months who do not have contraindications   Pneumococcal Vaccine (Prevnar and Pneumovax)  * Prevnar = PCV13  * Pneumovax = PPSV23   Adults 25-60 years old: 1-3 doses may be recommended based on certain risk factors  Adults 72 years old: Prevnar (PCV13) vaccine recommended followed by Pneumovax (PPSV23) vaccine  If already received PPSV23 since turning 65, then PCV13 recommended at least one year after PPSV23 dose  Hepatitis B Vaccine 3 dose series if at intermediate or high risk (ex: diabetes, end stage renal disease, liver disease)   Tetanus (Td) Vaccine - COST NOT COVERED BY MEDICARE PART B Following completion of primary series, a booster dose should be given every 10 years to maintain immunity against tetanus  Td may also be given as tetanus wound prophylaxis  Tdap Vaccine - COST NOT COVERED BY MEDICARE PART B Recommended at least once for all adults  For pregnant patients, recommended with each pregnancy  Shingles Vaccine (Shingrix) - COST NOT COVERED BY MEDICARE PART B  2 shot series recommended in those aged 48 and above     Health Maintenance Due:      Topic Date Due    HIV Screening  08/31/1983    MAMMOGRAM  03/14/2020    Cervical Cancer Screening  07/17/2020    Colonoscopy Surveillance  08/24/2023    Colorectal Cancer Screening  08/24/2030     Immunizations Due:      Topic Date Due    Pneumococcal Vaccine: Pediatrics (0 to 5 Years) and At-Risk Patients (6 to 59 Years) (1 of 1 - PPSV23) 08/31/1974    DTaP,Tdap,and Td Vaccines (1 - Tdap) 08/31/1989     Advance Directives   What are advance directives? Advance directives are legal documents that state your wishes and plans for medical care  These plans are made ahead of time in case you lose your ability to make decisions for yourself  Advance directives can apply to any medical decision, such as the treatments you want, and if you want to donate organs  What are the types of advance directives? There are many types of advance directives, and each state has rules about how to use them  You may choose a combination of any of the following:  · Living will: This is a written record of the treatment you want  You can also choose which treatments you do not want, which to limit, and which to stop at a certain time  This includes surgery, medicine, IV fluid, and tube feedings  · Durable power of  for healthcare [facility]): This is a written record that states who you want to make healthcare choices for you when you are unable to make them for yourself  This person, called a proxy, is usually a family member or a friend  You may choose more than 1 proxy  · Do not resuscitate (DNR) order:  A DNR order is used in case your heart stops beating or you stop breathing  It is a request not to have certain forms of treatment, such as CPR  A DNR order may be included in other types of advance directives  · Medical directive: This covers the care that you want if you are in a coma, near death, or unable to make decisions for yourself  You can list the treatments you want for each condition  Treatment may include pain medicine, surgery, blood transfusions, dialysis, IV or tube feedings, and a ventilator (breathing machine)  · Values history: This document has questions about your views, beliefs, and how you feel and think about life  This information can help others choose the care that you would choose  Why are advance directives important? An advance directive helps you control your care  Although spoken wishes may be used, it is better to have your wishes written down  Spoken wishes can be misunderstood, or not followed  Treatments may be given even if you do not want them   An advance directive may make it easier for your family to make difficult choices about your care  Cigarette Smoking and Your Health   Risks to your health if you smoke:  Nicotine and other chemicals found in tobacco damage every cell in your body  Even if you are a light smoker, you have an increased risk for cancer, heart disease, and lung disease  If you are pregnant or have diabetes, smoking increases your risk for complications  Benefits to your health if you stop smoking:   · You decrease respiratory symptoms such as coughing, wheezing, and shortness of breath  · You reduce your risk for cancers of the lung, mouth, throat, kidney, bladder, pancreas, stomach, and cervix  If you already have cancer, you increase the benefits of chemotherapy  You also reduce your risk for cancer returning or a second cancer from developing  · You reduce your risk for heart disease, blood clots, heart attack, and stroke  · You reduce your risk for lung infections, and diseases such as pneumonia, asthma, chronic bronchitis, and emphysema  · Your circulation improves  More oxygen can be delivered to your body  If you have diabetes, you lower your risk for complications, such as kidney, artery, and eye diseases  You also lower your risk for nerve damage  Nerve damage can lead to amputations, poor vision, and blindness  · You improve your body's ability to heal and to fight infections  For more information and support to stop smoking:   · [employer]  Phone: 5- 451 - [phone]  Web Address: www [employer]  Weight Management   Why it is important to manage your weight:  Being overweight increases your risk of health conditions such as heart disease, high blood pressure, type 2 diabetes, and certain types of cancer  It can also increase your risk for osteoarthritis, sleep apnea, and other respiratory problems  Aim for a slow, steady weight loss  Even a small amount of weight loss can lower your risk of health problems    How to lose weight safely:  A safe and healthy way to lose weight is to eat fewer calories and get regular exercise  You can lose up about 1 pound a week by decreasing the number of calories you eat by 500 calories each day  Healthy meal plan for weight management:  A healthy meal plan includes a variety of foods, contains fewer calories, and helps you stay healthy  A healthy meal plan includes the following:  · Eat whole-grain foods more often  A healthy meal plan should contain fiber  Fiber is the part of grains, fruits, and vegetables that is not broken down by your body  Whole-grain foods are healthy and provide extra fiber in your diet  Some examples of whole-grain foods are whole-wheat breads and pastas, oatmeal, brown rice, and bulgur  · Eat a variety of vegetables every day  Include dark, leafy greens such as spinach, kale, samson greens, and mustard greens  Eat yellow and orange vegetables such as carrots, sweet potatoes, and winter squash  · Eat a variety of fruits every day  Choose fresh or canned fruit (canned in its own juice or light syrup) instead of juice  Fruit juice has very little or no fiber  · Eat low-fat dairy foods  Drink fat-free (skim) milk or 1% milk  Eat fat-free yogurt and low-fat cottage cheese  Try low-fat cheeses such as mozzarella and other reduced-fat cheeses  · Choose meat and other protein foods that are low in fat  Choose beans or other legumes such as split peas or lentils  Choose fish, skinless poultry (chicken or turkey), or lean cuts of red meat (beef or pork)  Before you cook meat or poultry, cut off any visible fat  · Use less fat and oil  Try baking foods instead of frying them  Add less fat, such as margarine, sour cream, regular salad dressing and mayonnaise to foods  Eat fewer high-fat foods  Some examples of high-fat foods include french fries, doughnuts, ice cream, and cakes  · Eat fewer sweets  Limit foods and drinks that are high in sugar   This includes candy, cookies, regular soda, and sweetened drinks  Exercise:  Exercise at least 30 minutes per day on most days of the week  Some examples of exercise include walking, biking, dancing, and swimming  You can also fit in more physical activity by taking the stairs instead of the elevator or parking farther away from stores  Ask your healthcare provider about the best exercise plan for you  © Copyright [employer] 2018 Information is for End User's use only and may not be sold, redistributed or otherwise used for commercial purposes   All illustrations and images included in CareNotes® are the copyrighted property of [employer]  or [address] [place] [address] [employer]

## 2021-01-20 NOTE — PROGRESS NOTES
Vern    NAME: Crista Riojas  AGE: 44 y o  SEX: female  : 1970     DATE: 2021     Assessment and Plan:     1  Type 2 diabetes mellitus without complication, without long-term current use of insulin ([facility] [address] )    Most recent A1c was 7 0 % on 2020  Continue current oral hypoglycemics  UTD labs were ordered  - Lipid panel; Future  - Hemoglobin A1C; Future  - Microalbumin / creatinine urine ratio; Future  - CBC; Future  - Basic metabolic panel; Future    2  Hypothyroidism (acquired)    TSH has been in the normal range  Continue synthroid  - TSH, 3rd generation; Future    3  Mixed hyperlipidemia    Continue statin as prescribed  Regular exercise encouraged  4  Bipolar disorder, in full remission, most recent episode depressed [facility])    She follows with psychiatry  Her mental health has been stable  No signs of adele or worsening depression  5  Bronchospasm  - albuterol (PROVENTIL HFA,VENTOLIN HFA) 90 mcg/act inhaler; Inhale 2 puffs every 6 (six) hours as needed for wheezing or shortness of breath  Dispense: 1 Inhaler; Refill: 5            Return in about 4 months (around 2021) for Follow-up  Chief Complaint:     Chief Complaint   Patient presents with    Medicare Wellness Visit    Follow-up     4 month      History of Present Illness:     Patient presents for follow-up  Her health has been stable  No worsening of her mental health  Diabetes generally well controlled  Most recent A1c was 7 0 % on 2020  Review of Systems:     Review of Systems   Constitutional: Negative for activity change, appetite change and fatigue  Respiratory: Negative for apnea, cough, chest tightness, shortness of breath and wheezing  Cardiovascular: Negative for chest pain, palpitations and leg swelling  Gastrointestinal: Negative for abdominal distention, abdominal pain, blood in stool, constipation, diarrhea, nausea and vomiting  Neurological: Negative for dizziness, weakness, light-headedness, numbness and headaches  Psychiatric/Behavioral: Negative for behavioral problems, confusion, hallucinations, sleep disturbance and suicidal ideas  The patient is not nervous/anxious  Objective:     /76 (BP Location: Left arm, Patient Position: Sitting, Cuff Size: Standard)   Pulse 95   Temp 98 2 °F (36 8 °C) (Temporal) Comment: NO NSAIDS  Ht 5' 2 5" (1 588 m)   Wt 74 kg (163 lb 3 2 oz)   SpO2 97%   BMI 29 37 kg/m²     Physical Exam  Vitals signs reviewed  Constitutional:       General: She is not in acute distress  Appearance: She is well-developed  She is not diaphoretic  Eyes:      General: No scleral icterus  Right eye: No discharge  Left eye: No discharge  Conjunctiva/sclera: Conjunctivae normal    Neck:      Musculoskeletal: Neck supple  Thyroid: No thyromegaly  Vascular: No JVD  Cardiovascular:      Rate and Rhythm: Normal rate and regular rhythm  Pulses: no weak pulses          Dorsalis pedis pulses are 2+ on the right side and 2+ on the left side  Posterior tibial pulses are 2+ on the right side and 2+ on the left side  Heart sounds: Normal heart sounds  No murmur  Pulmonary:      Effort: Pulmonary effort is normal  No respiratory distress  Breath sounds: Normal breath sounds  No wheezing or rales  Abdominal:      General: Bowel sounds are normal  There is no distension  Palpations: Abdomen is soft  Tenderness: There is no abdominal tenderness  Musculoskeletal:      Right lower leg: No edema  Left lower leg: No edema  Feet:      Right foot:      Skin integrity: No ulcer, skin breakdown, erythema, warmth, callus or dry skin  Left foot:      Skin integrity: No ulcer, skin breakdown, erythema, warmth, callus or dry skin  Lymphadenopathy:      Cervical: No cervical adenopathy  Skin:     General: Skin is warm and dry     Neurological: Mental Status: She is alert  Psychiatric:         Mood and Affect: Mood normal          Behavior: Behavior normal      Diabetic Foot Exam  Patient's shoes and socks removed  Right Foot/Ankle   Right Foot Inspection  Skin Exam: skin normal and skin intact no dry skin, no warmth, no callus, no erythema, no maceration, no abnormal color, no pre-ulcer, no ulcer and no callus                          Toe Exam: ROM and strength within normal limits  Sensory     Proprioception: intact   Monofilament testing: intact  Vascular  Capillary refills: < 3 seconds  The right DP pulse is 2+  The right PT pulse is 2+  Left Foot/Ankle  Left Foot Inspection  Skin Exam: skin normal and skin intactno dry skin, no warmth, no erythema, no maceration, normal color, no pre-ulcer, no ulcer and no callus                         Toe Exam: ROM and strength within normal limits                   Sensory     Proprioception: intact  Monofilament: intact  Vascular  Capillary refills: < 3 seconds  The left DP pulse is 2+  The left PT pulse is 2+  Assign Risk Category:  No deformity present; No loss of protective sensation;  No weak pulses       Risk: 0         Tod Robles, [facility] [address]

## 2021-01-21 ENCOUNTER — TELEPHONE (OUTPATIENT)
Dept: ADMINISTRATIVE | Facility: OTHER | Age: 51
End: 2021-01-21

## 2021-01-21 NOTE — LETTER
*  Diabetic Eye Exam Form    Date Requested: 21  Patient: Javi Herrmann  Patient : 1970   Referring Provider: Phyllis Snyder,     Dilated Retinal Exam, Optomap-Iris Exam, or Fundus Photography Done         Yes ([place] one above)         No     Date of Diabetic Eye Exam ______________________________  Left Eye      Exam did show retinopathy    Exam did not show retinopathy         Mild       Moderate       None       Proliferative       Severe     Right Eye     Exam did show retinopathy    Exam did not show retinopathy         Mild       Moderate       None       Proliferative       Severe     Comments __________________________________________________________    Practice Providing Exam ______________________________________________    Exam Performed By (print name) _______________________________________      Provider Signature ___________________________________________________      These reports are needed for  compliance    Please fax this completed form and a copy of the Diabetic Eye Exam report to our office located at [place] [address] as soon as possible to [phone] attention Mary Infante: Phone [phone]    We thank you for your assistance in treating our mutual patient

## 2021-01-21 NOTE — TELEPHONE ENCOUNTER
Upon review of the In Basket request and the patient's chart, initial outreach has been made via fax, please see Contacts section for details       Thank you  Bernadette Fox

## 2021-01-21 NOTE — TELEPHONE ENCOUNTER
----- Message from Shanice Angel sent at 1/20/2021  3:35 PM EST -----  Regarding: EYE EXAM [facility] INTERNAL MED  01/20/21 3:36 PM    Hello, our patient Jaylon Wood has had Diabetic Eye Exam completed/performed   Please assist in updating the patient chart by making an External outreach to [address] [place] [address] located in [facility] The date of service is per pt last was 08/2020 P#[phone]    Thank you,  Grant Koch MA  [facility]  [address]

## 2021-01-22 NOTE — TELEPHONE ENCOUNTER
Upon review of the In Basket request we were able to locate, review, and update the patient chart as requested for Diabetic Eye Exam     Any additional questions or concerns should be emailed to the Practice Liaisons via [email or web address] email, please do not reply via In Basket      Thank you  Cayden Mesa

## 2021-02-25 ENCOUNTER — VBI (OUTPATIENT)
Dept: ADMINISTRATIVE | Facility: OTHER | Age: 51
End: 2021-02-25

## 2021-03-22 LAB
CREAT ?TM UR-SCNC: 130 UMOL/L
EXT MICROALBUMIN URINE RANDOM: 1.2
MICROALBUMIN/CREAT UR: 9.2 MG/G{CREAT}

## 2021-04-08 ENCOUNTER — VBI (OUTPATIENT)
Dept: ADMINISTRATIVE | Facility: OTHER | Age: 51
End: 2021-04-08

## 2021-04-26 DIAGNOSIS — K21.9 GASTROESOPHAGEAL REFLUX DISEASE WITHOUT ESOPHAGITIS: ICD-10-CM

## 2021-04-27 RX ORDER — ESOMEPRAZOLE MAGNESIUM 40 MG/1
CAPSULE, DELAYED RELEASE ORAL
Qty: 180 CAPSULE | Refills: 3 | Status: SHIPPED | OUTPATIENT
Start: 2021-04-27 | End: 2022-06-24

## 2021-07-12 ENCOUNTER — RA CDI HCC (OUTPATIENT)
Dept: OTHER | Facility: HOSPITAL | Age: 51
End: 2021-07-12

## 2021-07-12 NOTE — PROGRESS NOTES
[facility]  coding opportunities          Chart reviewed, no opportunity found: CHART REVIEWED, NO OPPORTUNITY FOUND                     Patients insurance company: [employer]

## 2021-07-16 ENCOUNTER — OFFICE VISIT (OUTPATIENT)
Dept: INTERNAL MEDICINE CLINIC | Facility: CLINIC | Age: 51
End: 2021-07-16
Payer: COMMERCIAL

## 2021-07-16 VITALS
SYSTOLIC BLOOD PRESSURE: 120 MMHG | TEMPERATURE: 97.2 F | DIASTOLIC BLOOD PRESSURE: 74 MMHG | OXYGEN SATURATION: 98 % | HEART RATE: 66 BPM | HEIGHT: 63 IN | WEIGHT: 164.2 LBS | BODY MASS INDEX: 29.09 KG/M2

## 2021-07-16 DIAGNOSIS — Z12.2 ENCOUNTER FOR SCREENING FOR LUNG CANCER: ICD-10-CM

## 2021-07-16 DIAGNOSIS — K31.84 DIABETIC GASTROPARESIS ASSOCIATED WITH TYPE 2 DIABETES MELLITUS (HCC): ICD-10-CM

## 2021-07-16 DIAGNOSIS — Z87.891 PERSONAL HISTORY OF NICOTINE DEPENDENCE: ICD-10-CM

## 2021-07-16 DIAGNOSIS — E11.43 DIABETIC GASTROPARESIS ASSOCIATED WITH TYPE 2 DIABETES MELLITUS (HCC): ICD-10-CM

## 2021-07-16 DIAGNOSIS — E11.65 TYPE 2 DIABETES MELLITUS WITH HYPERGLYCEMIA, WITH LONG-TERM CURRENT USE OF INSULIN (HCC): Primary | ICD-10-CM

## 2021-07-16 DIAGNOSIS — Z79.4 TYPE 2 DIABETES MELLITUS WITH HYPERGLYCEMIA, WITH LONG-TERM CURRENT USE OF INSULIN (HCC): Primary | ICD-10-CM

## 2021-07-16 DIAGNOSIS — E03.9 HYPOTHYROIDISM (ACQUIRED): ICD-10-CM

## 2021-07-16 DIAGNOSIS — Z11.59 NEED FOR HEPATITIS C SCREENING TEST: ICD-10-CM

## 2021-07-16 DIAGNOSIS — J30.9 ALLERGIC RHINITIS, UNSPECIFIED SEASONALITY, UNSPECIFIED TRIGGER: ICD-10-CM

## 2021-07-16 DIAGNOSIS — Z12.31 ENCOUNTER FOR SCREENING MAMMOGRAM FOR BREAST CANCER: ICD-10-CM

## 2021-07-16 PROCEDURE — 99214 OFFICE O/P EST MOD 30 MIN: CPT | Performed by: INTERNAL MEDICINE

## 2021-07-16 PROCEDURE — 3008F BODY MASS INDEX DOCD: CPT | Performed by: INTERNAL MEDICINE

## 2021-07-16 PROCEDURE — 4004F PT TOBACCO SCREEN RCVD TLK: CPT | Performed by: INTERNAL MEDICINE

## 2021-07-16 RX ORDER — FLUTICASONE PROPIONATE 50 MCG
1 SPRAY, SUSPENSION (ML) NASAL DAILY
Qty: 48 G | Refills: 3 | Status: SHIPPED | OUTPATIENT
Start: 2021-07-16 | End: 2022-06-14 | Stop reason: SDUPTHER

## 2021-07-16 RX ORDER — FLUTICASONE PROPIONATE 50 MCG
1 SPRAY, SUSPENSION (ML) NASAL DAILY
Qty: 48 G | Refills: 3 | Status: SHIPPED | OUTPATIENT
Start: 2021-07-16 | End: 2021-07-16

## 2021-07-16 NOTE — PATIENT INSTRUCTIONS
Type 2 Diabetes Management for Adults   WHAT YOU NEED TO KNOW:   What is type 2 diabetes? Type 2 diabetes is a disease that affects how your body uses glucose (sugar)  Normally, when the blood sugar level increases, the pancreas makes more insulin  Insulin helps move sugar out of the blood so it can be used for energy  Type 2 diabetes develops because either the body cannot make enough insulin, or it cannot use the insulin correctly  Type 2 diabetes can be controlled to prevent damage to your heart, blood vessels, and other organs  What can I do to manage my blood sugar levels? · Make healthy food choices  Healthy foods can give you energy to learn and be active  Healthy foods can also help you keep your blood sugar in balance, and manage or lose weight safely  Work with a dietitian to develop a meal plan that works for you and your schedule  A dietitian can help you learn how to eat the right amount of carbohydrates during your meals and snacks  Carbohydrates can raise your blood sugar if you eat too many at one time  Some foods that contain carbohydrates include breads, cereals, rice, pasta, and sweets  · Make healthy beverage choices  Drink water  Decrease the amount of drinks with sugar substitutes you have, such as diet sodas  Avoid sugar-sweetened drinks, such as regular sodas and fruit juice  · Get regular physical activity  Physical activity helps to lower your blood sugar levels  It can also help you manage your weight  Get at least 150 minutes of moderate to vigorous aerobic physical activity each week  Do not miss more than 2 days in a row  Do not sit longer than 30 minutes at a time  Your healthcare provider can help you create an activity plan  The plan can include the best activities for you and can help you build your strength and endurance  · Maintain a healthy weight  Ask your healthcare provider how much you should weigh   Ask him or her to help you create a safe weight loss plan if you are overweight  Weight loss can improve your blood sugar levels  · Check your blood sugar level as directed and as needed  Ask your healthcare provider what your blood sugar levels should be  ? Look at your schedule and make a plan for when you will check your blood sugar levels throughout the day  ? Check more often if you think your blood sugar is too high or too low  This will allow you to take care of any low or high blood sugar levels so they do not interfere with your activities  ? Rotate the sites where you do fingersticks  This will help make the checks less painful, and make fingerstick sites less noticeable  ? Write down your blood sugar levels so you can show them to your healthcare provider during your visits  Talk to your healthcare provider if you are having trouble keeping your blood sugar at the recommended levels  · Take your diabetes medicine or insulin as directed  You may need diabetes medicine, insulin, or both to help control your blood sugar levels  Your healthcare provider will teach you how and when to take your diabetes medicine or insulin  · Go to all follow-up appointments  Your healthcare provider may need to check your A1c every 3 months  An A1c test shows the average amount of sugar in your blood over the past 2 to 3 months  Your healthcare provider will tell you what your A1c level should be  What do I need to know about high blood sugar? High blood sugar may not cause any symptoms  It may cause you to feel more thirsty than usual or urinate more often than usual  Over time, high blood sugar levels can damage your nerves, blood vessels, tissues, and organs  · Large meals or large amounts of carbohydrates at one time can raise your blood sugar  · Decreased physical activity can raise your blood sugar  For example, your blood sugar can increase if you stop playing a sport or getting regular physical activity   Do not sit for longer than 90 minutes at a time  · Stress can raise your blood sugar  Ask your healthcare provider for help if you are having trouble managing stress  · Illness can raise your blood sugar  This can happen even if you eat less than usual while you are sick  Work with your healthcare provider to develop a sick day plan  This is a plan that helps you manage your blood sugar levels while you are sick  · A lower dose of medicine or insulin, or a late dose, can raise your blood sugar  There is not enough time for your medicine or insulin to work as it should if you take it late  When you take a lower dose, there is not enough medicine or insulin needed to lower your blood sugar  What do I need to know about low blood sugar? You can prevent symptoms such as shakiness, dizziness, irritability, or confusion by preventing your blood sugar from going too low  · Treat low blood sugar right away  Eat 15 grams of carbohydrate, such as 4 ounces of juice or 1 tube of glucose gel  Check your blood sugar again 10 to 15 minutes later  When your blood sugar goes back to normal, eat a meal or snack to prevent another decrease in blood sugar  ·          · Your blood sugar can get too low if you take diabetes medicine or insulin and do not eat enough food  It can also happen if you skip a meal or snack  · Increased physical activity can cause low blood sugar  If you use insulin, check your blood sugar before you exercise  If your blood sugar is below 100 mg/dL, eat 15 grams of carbohydrate  If you will exercise for more than 1 hour, check your blood sugar every 30 minutes  You may need to adjust your insulin before exercise  You may need a carbohydrate snack during or after exercise  What else can I do to manage my diabetes? · Wear medical alert jewelry or carry a card that says you have diabetes  Ask where to get these items  · Be safe when you drive    Check your blood sugar before you drive if you use insulin, and you think your blood sugar is low  If your blood sugar is low, eat 15 grams of carbohydrate and wait for your blood sugar to go back to normal  Keep snacks that contain carbohydrate in the car  If you feel like your blood sugar is low while you are driving, pull over and check your blood sugar level  Treat low blood sugar before you start driving again, if needed  · Know the risks if you choose to drink alcohol  Alcohol can cause your blood sugar levels to be low if you use insulin  Alcohol can cause high blood sugar levels and weight gain if you drink too much  Women should limit alcohol to 1 drink a day  Men should limit alcohol to 2 drinks a day  A drink of alcohol is 12 ounces of beer, 5 ounces of wine, or 1½ ounces of liquor  · Do not smoke  Nicotine can damage blood vessels and make it more difficult to manage your diabetes  Do not use e-cigarettes or smokeless tobacco in place of cigarettes or to help you quit  They still contain nicotine  Ask your healthcare provider for information if you currently smoke and need help quitting  · Have screenings for complications of diabetes and other conditions that happen with diabetes  You will need to be screened for kidney problems, high cholesterol, high blood pressure, blood vessel problems, eye problems, and eating disorders  Some screenings may begin right away and some may happen within the first 5 years of diagnosis  You will need to continue screenings through your lifetime  Keep your follow-up appointments with all providers  · Ask about vaccines  You have a higher risk for serious illness if you get the flu, pneumonia, or hepatitis  Ask your healthcare provider if you should get a flu, pneumonia, or hepatitis B vaccine, and when to get the vaccine  CARE AGREEMENT:   You have the right to help plan your care  Learn about your health condition and how it may be treated   Discuss treatment options with your healthcare providers to decide what care you want to receive  You always have the right to refuse treatment  The above information is an  only  It is not intended as medical advice for individual conditions or treatments  Talk to your doctor, nurse or pharmacist before following any medical regimen to see if it is safe and effective for you  © Copyright [address] Information is for End User's use only and may not be sold, redistributed or otherwise used for commercial purposes   All illustrations and images included in CareNotes® are the copyrighted property of A D A M , Inc  or [address] [place] [address]

## 2021-07-16 NOTE — PROGRESS NOTES
[place]    NAME: Gary Delvalle  AGE: 44 y o  SEX: female  : 1970     DATE: 2021     Assessment and Plan:     1  Type 2 diabetes mellitus with hyperglycemia, with long-term current use of insulin ([facility] [address] )  2  Diabetic gastroparesis associated with type 2 diabetes mellitus ([facility] [address] )    Continue f/u with endocrinology  In my opinion, she should quality for CGM to better monitor her blood sugars and figure out any kind of trend  Her blood sugars are variable and this can be secondary to gastroparesis  Most recent A1c was 7 4 % on 3/22/2021  Check UTD labs  - Hemoglobin A1C; Future  - Basic metabolic panel; Future    3  Hypothyroidism (acquired)    Continue levothyroxine as prescribed  Monitored by endocrinology  4  Allergic rhinitis  - fluticasone (FLONASE) 50 mcg/act nasal spray; 1 spray into each nostril daily  Dispense: 48 g; Refill: 3    5  Encounter for screening mammogram for breast cancer  - Mammo screening bilateral w 3d & cad; Future    6  Encounter for screening for lung cancer  7  Personal history of nicotine dependence    Due for lung cancer screening  Patient agreed  Unfortunately, she has no motivation to quit smoking at all     - [place] lung screening program; Future    8  Need for hepatitis C screening test  - Hepatitis C antibody; Future      Tobacco Cessation Counseling: Tobacco cessation counseling was provided  The patient is sincerely urged to quit consumption of tobacco  She is not ready to quit tobacco       Return in about 6 months (around 2022) for Follow-up  History of Present Illness:     Yaakov Muniz presents for follow-up  Blood sugars remain variable and difficult to control  Really needs to be on a CGM  Even though she is not on insulin, she has gastroparesis which causes wide variations in her blood sugars  Most recent A1c was 7 4 % on 3/22/2021  Continues to smoke  Has given up on the thought of quitting  Review of Systems:     Review of Systems   Constitutional: Negative  Respiratory: Negative  Cardiovascular: Negative  Gastrointestinal: Negative  Objective:     /74 (BP Location: Left arm, Patient Position: Sitting, Cuff Size: Standard)   Pulse 66   Temp (!) 97 2 °F (36 2 °C)   Ht 5' 2 5" (1 588 m)   Wt 74 5 kg (164 lb 3 2 oz)   SpO2 98%   BMI 29 55 kg/m²     Physical Exam  Constitutional:       General: She is not in acute distress  Appearance: She is not ill-appearing  Cardiovascular:      Rate and Rhythm: Normal rate and regular rhythm  Heart sounds: No murmur heard  Pulmonary:      Effort: Pulmonary effort is normal  No respiratory distress  Breath sounds: No wheezing  Abdominal:      General: Bowel sounds are normal  There is no distension  Tenderness: There is no abdominal tenderness  Musculoskeletal:      Right lower leg: No edema  Left lower leg: No edema  Neurological:      Mental Status: She is alert         Chaney Goodpasture, DO  [facility]

## 2021-07-19 ENCOUNTER — TELEPHONE (OUTPATIENT)
Dept: ADMINISTRATIVE | Facility: OTHER | Age: 51
End: 2021-07-19

## 2021-07-19 NOTE — TELEPHONE ENCOUNTER
----- Message from Min Turner DO sent at 7/19/2021 11:34 AM EDT -----  Regarding: RE: Microalbumin [facility] Internal Med  Contact: [phone]  Sorry about that  I linked the patient now   ----- Message -----  From: Tracey Kuhn MA  Sent: 7/19/2021  10:57 AM EDT  To: Min Turner DO  Subject: RE: [facility] Internal Med                             Thank you for your request  We cannot resolve your request because there is no patient attached in the \[facility]\""[address] (below the Subject line)  Please send a new Staff Message with a patient attached so that we can complete our workflow and start your request     Please do not respond to this [employer] with the patient data only included in the response message  In order to complete our workflow, the patient would have be to be attached to the Staff Message through Epic or we will not be able to create a telephone encounter to start working on resolving your request      Any additional questions or concerns should be emailed to the Practice Liaisons via [email or web address] email, please do not reply via In Basket  Thank you  Tracey Kuhn MA   ----- Message -----  From: Min Turner DO  Sent: 7/16/2021  12:09 PM EDT  To: [facility]  Subject: [address] [facility] Internal Med                    07/16/21 12:09 PM    Hello, our patient No patient name on file  has had Urine Microalbumin completed/performed  Please assist in updating the patient chart by pulling the Care Everywhere (CE) document       Thank you,  Arcadio Severino DO  [facility]  [address]

## 2021-07-19 NOTE — TELEPHONE ENCOUNTER
Upon review of the In Basket request we were able to locate, review, and update the patient chart as requested for Urine Microalbumin  Any additional questions or concerns should be emailed to the Practice Liaisons via [email or web address] email, please do not reply via In Basket      Thank you  Maria Antonia Smith MA

## 2021-07-30 ENCOUNTER — HOSPITAL ENCOUNTER (OUTPATIENT)
Dept: CT IMAGING | Facility: CLINIC | Age: 51
Discharge: HOME | End: 2021-07-30
Payer: COMMERCIAL

## 2021-07-30 DIAGNOSIS — Z87.891 PERSONAL HISTORY OF NICOTINE DEPENDENCE: ICD-10-CM

## 2021-07-30 DIAGNOSIS — Z12.2 ENCOUNTER FOR SCREENING FOR LUNG CANCER: ICD-10-CM

## 2021-07-30 PROCEDURE — 71271 CT THORAX LUNG CANCER SCR C-: CPT

## 2021-08-04 ENCOUNTER — TELEPHONE (OUTPATIENT)
Dept: INTERNAL MEDICINE CLINIC | Facility: CLINIC | Age: 51
End: 2021-08-04

## 2021-08-04 NOTE — TELEPHONE ENCOUNTER
----- Message from Quin Melendez DO sent at 8/4/2021 11:10 AM EDT -----  Normal lung cancer screening   Repeat in 1 year recommended

## 2021-08-25 ENCOUNTER — VBI (OUTPATIENT)
Dept: ADMINISTRATIVE | Facility: OTHER | Age: 51
End: 2021-08-25

## 2021-10-15 ENCOUNTER — VBI (OUTPATIENT)
Dept: ADMINISTRATIVE | Facility: OTHER | Age: 51
End: 2021-10-15

## 2021-10-19 ENCOUNTER — HOSPITAL ENCOUNTER (OUTPATIENT)
Dept: MAMMOGRAPHY | Facility: CLINIC | Age: 51
Discharge: HOME | End: 2021-10-19
Payer: COMMERCIAL

## 2021-10-19 VITALS — WEIGHT: 164 LBS | HEIGHT: 63 IN | BODY MASS INDEX: 29.06 KG/M2

## 2021-10-19 DIAGNOSIS — Z12.31 ENCOUNTER FOR SCREENING MAMMOGRAM FOR BREAST CANCER: ICD-10-CM

## 2021-10-19 PROCEDURE — 77063 BREAST TOMOSYNTHESIS BI: CPT

## 2021-10-19 PROCEDURE — 77067 SCR MAMMO BI INCL CAD: CPT

## 2021-10-20 ENCOUNTER — TELEPHONE (OUTPATIENT)
Dept: INTERNAL MEDICINE CLINIC | Facility: CLINIC | Age: 51
End: 2021-10-20

## 2021-11-10 ENCOUNTER — TELEMEDICINE (OUTPATIENT)
Dept: INTERNAL MEDICINE CLINIC | Facility: CLINIC | Age: 51
End: 2021-11-10
Payer: COMMERCIAL

## 2021-11-10 DIAGNOSIS — Z20.822 EXPOSURE TO COVID-19 VIRUS: Primary | ICD-10-CM

## 2021-11-10 DIAGNOSIS — N18.1 CKD (CHRONIC KIDNEY DISEASE), STAGE I: ICD-10-CM

## 2021-11-10 DIAGNOSIS — B34.9 VIRAL INFECTION, UNSPECIFIED: ICD-10-CM

## 2021-11-10 DIAGNOSIS — E11.65 UNCONTROLLED TYPE 2 DIABETES MELLITUS WITH HYPERGLYCEMIA (HCC): ICD-10-CM

## 2021-11-10 PROCEDURE — U0005 INFEC AGEN DETEC AMPLI PROBE: HCPCS | Performed by: NURSE PRACTITIONER

## 2021-11-10 PROCEDURE — U0003 INFECTIOUS AGENT DETECTION BY NUCLEIC ACID (DNA OR RNA); SEVERE ACUTE RESPIRATORY SYNDROME CORONAVIRUS 2 (SARS-COV-2) (CORONAVIRUS DISEASE [COVID-19]), AMPLIFIED PROBE TECHNIQUE, MAKING USE OF HIGH THROUGHPUT TECHNOLOGIES AS DESCRIBED BY CMS-2020-01-R: HCPCS | Performed by: NURSE PRACTITIONER

## 2021-11-10 PROCEDURE — 99213 OFFICE O/P EST LOW 20 MIN: CPT | Performed by: NURSE PRACTITIONER

## 2021-11-10 PROCEDURE — 4004F PT TOBACCO SCREEN RCVD TLK: CPT | Performed by: NURSE PRACTITIONER

## 2021-11-10 PROCEDURE — 3066F NEPHROPATHY DOC TX: CPT | Performed by: NURSE PRACTITIONER

## 2021-11-10 RX ORDER — ALPRAZOLAM 0.5 MG/1
TABLET ORAL
COMMUNITY
Start: 2021-10-11

## 2021-11-11 ENCOUNTER — TELEPHONE (OUTPATIENT)
Dept: INTERNAL MEDICINE CLINIC | Facility: CLINIC | Age: 51
End: 2021-11-11

## 2021-11-11 LAB — SARS-COV-2 RNA RESP QL NAA+PROBE: POSITIVE

## 2021-11-11 RX ORDER — ACETAMINOPHEN 325 MG/1
650 TABLET ORAL ONCE AS NEEDED
Status: CANCELLED | OUTPATIENT
Start: 2021-11-12

## 2021-11-11 RX ORDER — SODIUM CHLORIDE 9 MG/ML
20 INJECTION, SOLUTION INTRAVENOUS ONCE
Status: CANCELLED | OUTPATIENT
Start: 2021-11-12

## 2021-11-11 RX ORDER — ALBUTEROL SULFATE 90 UG/1
3 AEROSOL, METERED RESPIRATORY (INHALATION) ONCE AS NEEDED
Status: CANCELLED | OUTPATIENT
Start: 2021-11-12

## 2021-11-11 RX ORDER — ONDANSETRON 2 MG/ML
4 INJECTION INTRAMUSCULAR; INTRAVENOUS ONCE AS NEEDED
Status: CANCELLED | OUTPATIENT
Start: 2021-11-12

## 2021-11-12 ENCOUNTER — HOSPITAL ENCOUNTER (OUTPATIENT)
Dept: INFUSION CENTER | Facility: HOSPITAL | Age: 51
Discharge: HOME | End: 2021-11-12
Payer: COMMERCIAL

## 2021-11-12 ENCOUNTER — TELEPHONE (OUTPATIENT)
Dept: INTERNAL MEDICINE CLINIC | Facility: CLINIC | Age: 51
End: 2021-11-12

## 2021-11-12 VITALS
SYSTOLIC BLOOD PRESSURE: 107 MMHG | HEART RATE: 94 BPM | TEMPERATURE: 100.3 F | DIASTOLIC BLOOD PRESSURE: 63 MMHG | RESPIRATION RATE: 18 BRPM | OXYGEN SATURATION: 96 %

## 2021-11-12 DIAGNOSIS — E11.65 UNCONTROLLED TYPE 2 DIABETES MELLITUS WITH HYPERGLYCEMIA (HCC): ICD-10-CM

## 2021-11-12 DIAGNOSIS — N18.1 CKD (CHRONIC KIDNEY DISEASE), STAGE I: Primary | ICD-10-CM

## 2021-11-12 PROCEDURE — M0243 CASIRIVI AND IMDEVI INFUSION: HCPCS | Performed by: INTERNAL MEDICINE

## 2021-11-12 RX ORDER — SODIUM CHLORIDE 9 MG/ML
20 INJECTION, SOLUTION INTRAVENOUS ONCE
Status: CANCELLED | OUTPATIENT
Start: 2021-11-12

## 2021-11-12 RX ORDER — ACETAMINOPHEN 325 MG/1
650 TABLET ORAL ONCE AS NEEDED
Status: CANCELLED | OUTPATIENT
Start: 2021-11-12

## 2021-11-12 RX ORDER — ALBUTEROL SULFATE 90 UG/1
3 AEROSOL, METERED RESPIRATORY (INHALATION) ONCE AS NEEDED
Status: CANCELLED | OUTPATIENT
Start: 2021-11-12

## 2021-11-12 RX ORDER — ONDANSETRON 2 MG/ML
4 INJECTION INTRAMUSCULAR; INTRAVENOUS ONCE AS NEEDED
Status: DISCONTINUED | OUTPATIENT
Start: 2021-11-12 | End: 2021-11-15 | Stop reason: HOSPADM

## 2021-11-12 RX ORDER — ONDANSETRON 2 MG/ML
4 INJECTION INTRAMUSCULAR; INTRAVENOUS ONCE AS NEEDED
Status: CANCELLED | OUTPATIENT
Start: 2021-11-12

## 2021-11-12 RX ORDER — SODIUM CHLORIDE 9 MG/ML
20 INJECTION, SOLUTION INTRAVENOUS ONCE
Status: COMPLETED | OUTPATIENT
Start: 2021-11-12 | End: 2021-11-12

## 2021-11-12 RX ORDER — ACETAMINOPHEN 325 MG/1
650 TABLET ORAL ONCE AS NEEDED
Status: DISCONTINUED | OUTPATIENT
Start: 2021-11-12 | End: 2021-11-15 | Stop reason: HOSPADM

## 2021-11-12 RX ORDER — ALBUTEROL SULFATE 90 UG/1
3 AEROSOL, METERED RESPIRATORY (INHALATION) ONCE AS NEEDED
Status: DISCONTINUED | OUTPATIENT
Start: 2021-11-12 | End: 2021-11-15 | Stop reason: HOSPADM

## 2021-11-12 RX ADMIN — SODIUM CHLORIDE 20 ML/HR: 9 INJECTION, SOLUTION INTRAVENOUS at 17:40

## 2021-11-12 RX ADMIN — CASIRIVIMAB AND IMDEVIMAB 1200 MG COMBINED: 600; 600 INJECTION, SOLUTION, CONCENTRATE INTRAVENOUS at 17:54

## 2021-11-15 ENCOUNTER — TELEPHONE (OUTPATIENT)
Dept: INTERNAL MEDICINE CLINIC | Facility: CLINIC | Age: 51
End: 2021-11-15

## 2021-11-15 DIAGNOSIS — H92.03 OTALGIA OF BOTH EARS: Primary | ICD-10-CM

## 2021-11-15 RX ORDER — AZITHROMYCIN 250 MG/1
TABLET, FILM COATED ORAL
Qty: 6 TABLET | Refills: 0 | Status: SHIPPED | OUTPATIENT
Start: 2021-11-15 | End: 2021-11-20

## 2022-01-20 ENCOUNTER — VBI (OUTPATIENT)
Dept: ADMINISTRATIVE | Facility: OTHER | Age: 52
End: 2022-01-20

## 2022-01-31 ENCOUNTER — VBI (OUTPATIENT)
Dept: ADMINISTRATIVE | Facility: OTHER | Age: 52
End: 2022-01-31

## 2022-06-14 ENCOUNTER — TELEPHONE (OUTPATIENT)
Dept: ADMINISTRATIVE | Facility: OTHER | Age: 52
End: 2022-06-14

## 2022-06-14 ENCOUNTER — OFFICE VISIT (OUTPATIENT)
Dept: INTERNAL MEDICINE CLINIC | Facility: CLINIC | Age: 52
End: 2022-06-14
Payer: COMMERCIAL

## 2022-06-14 VITALS
RESPIRATION RATE: 20 BRPM | TEMPERATURE: 98 F | BODY MASS INDEX: 29.44 KG/M2 | OXYGEN SATURATION: 98 % | DIASTOLIC BLOOD PRESSURE: 78 MMHG | HEART RATE: 70 BPM | WEIGHT: 160 LBS | HEIGHT: 62 IN | SYSTOLIC BLOOD PRESSURE: 122 MMHG

## 2022-06-14 DIAGNOSIS — E11.9 TYPE 2 DIABETES MELLITUS WITHOUT COMPLICATION, WITHOUT LONG-TERM CURRENT USE OF INSULIN (HCC): Primary | Chronic | ICD-10-CM

## 2022-06-14 DIAGNOSIS — J30.9 ALLERGIC RHINITIS, UNSPECIFIED SEASONALITY, UNSPECIFIED TRIGGER: ICD-10-CM

## 2022-06-14 DIAGNOSIS — Z87.891 PERSONAL HISTORY OF NICOTINE DEPENDENCE: ICD-10-CM

## 2022-06-14 DIAGNOSIS — F31.76 BIPOLAR DISORDER, IN FULL REMISSION, MOST RECENT EPISODE DEPRESSED (HCC): ICD-10-CM

## 2022-06-14 DIAGNOSIS — E03.9 HYPOTHYROIDISM (ACQUIRED): ICD-10-CM

## 2022-06-14 DIAGNOSIS — Z12.2 ENCOUNTER FOR SCREENING FOR LUNG CANCER: ICD-10-CM

## 2022-06-14 PROBLEM — E11.65 UNCONTROLLED TYPE 2 DIABETES MELLITUS WITH HYPERGLYCEMIA (HCC): Status: RESOLVED | Noted: 2021-07-16 | Resolved: 2022-06-14

## 2022-06-14 PROCEDURE — 4004F PT TOBACCO SCREEN RCVD TLK: CPT | Performed by: INTERNAL MEDICINE

## 2022-06-14 PROCEDURE — 3008F BODY MASS INDEX DOCD: CPT | Performed by: INTERNAL MEDICINE

## 2022-06-14 PROCEDURE — 99214 OFFICE O/P EST MOD 30 MIN: CPT | Performed by: INTERNAL MEDICINE

## 2022-06-14 PROCEDURE — 3725F SCREEN DEPRESSION PERFORMED: CPT | Performed by: INTERNAL MEDICINE

## 2022-06-14 RX ORDER — FLUTICASONE PROPIONATE 50 MCG
1 SPRAY, SUSPENSION (ML) NASAL DAILY
Qty: 48 G | Refills: 3 | Status: SHIPPED | OUTPATIENT
Start: 2022-06-14

## 2022-06-14 NOTE — TELEPHONE ENCOUNTER
----- Message from [facility] sent at 6/14/2022 11:25 AM EDT -----  Regarding: Care Gap Request [facility] Internal Med  06/14/22 11:25 AM    Hello, our patient Kofi Romo has had Diabetic Foot Exam completed/performed  Please assist in updating the patient chart by pulling a previous Electronic Medical Record (EMR) document   The previous EMR is  [facility]     Thank you,  [facility]  [address]

## 2022-06-14 NOTE — PATIENT INSTRUCTIONS
Type 2 Diabetes Management for Adults   WHAT YOU NEED TO KNOW:   What is type 2 diabetes? Type 2 diabetes is a disease that affects how your body uses glucose (sugar)  Either your body cannot make enough insulin, or it cannot use the insulin correctly  It is important to keep diabetes controlled to prevent damage to your heart, blood vessels, and other organs  What do I need to know about high blood sugar levels? High blood sugar levels may not cause any symptoms  You may feel more thirsty or urinate more often than usual  Over time, high blood sugar levels can damage your nerves, blood vessels, tissues, and organs  The following can increase your blood sugar levels:  Large meals or large amounts of carbohydrates at one time    Less physical activity    Stress    Illness    A lower dose of medicine or insulin, or a late dose    What do I need to know about low blood sugar levels? You can prevent symptoms such as shakiness, dizziness, irritability, or confusion by preventing your blood sugar levels from going too low  Treat a low blood sugar level right away  Drink 4 ounces of juice or have 1 tube of glucose gel  Check your blood sugar level again 10 to 15 minutes later  When the level goes back to normal, eat a meal or snack to prevent another decrease  Keep glucose gel, raisins, or hard candy with you at all times to treat a low blood sugar level  Your blood sugar level can get too low if you take diabetes medicine or insulin and do not eat enough food  If you use insulin, check your blood sugar level before you exercise  If your blood sugar level is below 100 mg/dL, eat 4 crackers or 2 ounces of raisins, or drink 4 ounces of juice  Check your level every 30 minutes if you exercise more than 1 hour  You may need a snack during or after exercise  What can I do to manage my blood sugar levels? Check your blood sugar levels as directed and as needed    Several items are available to use to check your levels  You may need to check by testing a drop of blood in a glucose monitor  You may instead be given a continuous glucose monitoring (CGM) device  The device is worn at all times  The CGM checks your blood sugar level every 5 minutes  It sends results to an electronic device such as a smart phone  A CGM can be used with or without an insulin pump  Talk with your provider to find out which method is best for you  The goal for blood sugar levels before meals  is between 80 and 130 mg/dL and 2 hours after eating  is lower than 180 mg/dL  Make healthy food choices  Work with a dietitian to develop a meal plan that works for you and your schedule  A dietitian can help you learn how to eat the right amount of carbohydrates during your meals and snacks  Carbohydrates can raise your blood sugar level if you eat too many at one time  Examples of foods that contain carbohydrates are breads, cereals, rice, pasta, and sweets  Get regular physical activity  Physical activity can help you get to your target blood sugar level goal and manage your weight  Get at least 150 minutes of moderate to vigorous aerobic physical activity each week  Do not miss more than 2 days in a row  Do not sit longer than 30 minutes at a time  Your healthcare provider can help you create an activity plan  The plan can include the best activities for you and can help you build your strength and endurance  Maintain a healthy weight  Ask your healthcare provider what a healthy weight is for you  Ask him or her to help you create a safe weight loss plan if you are overweight  Take your diabetes medicine or insulin as directed  You may need diabetes medicine, insulin, or both to help control your blood sugar levels  Your healthcare provider will teach you how and when to take your diabetes medicine or insulin   You will also be taught about side effects oral diabetes medicine can cause  Insulin may be injected, or given through a pump or pen  You and your care team will discuss which method is best for you  An insulin pump  is an implanted device that gives your insulin 24 hours a day  An insulin pump prevents the need for multiple insulin injections in a day  An insulin pen  is a device prefilled with the right amount of insulin  You and your family members will be taught how to draw up and give insulin  if this is the best method for you  Your education team will also teach you how to dispose of needles and syringes  You will learn how much insulin you need  and when to give it  You will be taught when not to give insulin  You will also be taught what to do if your blood sugar level drops too low  This may happen if you take insulin and do not eat the right amount of carbohydrates  What else can I do to manage type 2 diabetes? Wear medical alert identification  Wear medical alert jewelry or carry a card that says you have diabetes  Ask your provider where to get these items  Do not smoke  Nicotine and other chemicals in cigarettes and cigars can cause lung and blood vessel damage  It also makes it more difficult to manage your diabetes  Ask your provider for information if you currently smoke and need help to quit  Do not use e-cigarettes or smokeless tobacco in place of cigarettes or to help you quit  They still contain nicotine  Check your feet each day for cuts, scratches, calluses, or other wounds  Look for redness and swelling, and feel for warmth  Wear shoes that fit well  Check your shoes for rocks or other objects that can hurt your feet  Do not walk barefoot or wear shoes without socks  Wear cotton socks to help keep your feet dry  Ask about vaccines you may need  You have a higher risk for serious illness if you get the flu, pneumonia, COVID-19, or hepatitis   Ask your provider if you should get vaccines to prevent these or other diseases, and when to get the vaccines  Talk to your care team if you become stressed about diabetes care  Sometimes being able to fit diabetes care into your life can cause increased stress  The stress can cause you not to take care of yourself properly  Your care team can help by offering tips about self-care  Your care team may suggest you talk to a mental health provider  The provider can listen and offer help with self-care issues  Have someone call your local emergency number (911 in the [address] [facility]) if:   You cannot be woken  You have signs of diabetic ketoacidosis:     confusion, fatigue    vomiting    rapid heartbeat    fruity smelling breath    extreme thirst    dry mouth and skin    You have any of the following signs of a heart attack:      Squeezing, pressure, or pain in your chest    You may  also have any of the following:     Discomfort or pain in your back, neck, jaw, stomach, or arm    Shortness of breath    Nausea or vomiting    Lightheadedness or a sudden cold sweat    You have any of the following signs of a stroke:      Numbness or drooping on one side of your face     Weakness in an arm or leg    Confusion or difficulty speaking    Dizziness, a severe headache, or vision loss    When should I call my doctor or diabetes care team?   You have a sore or wound that will not heal     You have a change in the amount you urinate  Your blood sugar levels are higher than your target goals  You often have lower blood sugar levels than your target goals  Your skin is red, dry, warm, or swollen  You have trouble coping with diabetes, or you feel anxious or depressed  You have questions or concerns about your condition or care  CARE AGREEMENT:   You have the right to help plan your care  Learn about your health condition and how it may be treated  Discuss treatment options with your healthcare providers to decide what care you want to receive   You always have the right to refuse treatment  The above information is an  only  It is not intended as medical advice for individual conditions or treatments  Talk to your doctor, nurse or pharmacist before following any medical regimen to see if it is safe and effective for you  © Copyright [employer] 2022 Information is for End User's use only and may not be sold, redistributed or otherwise used for commercial purposes   All illustrations and images included in CareNotes® are the copyrighted property of A D A M , Inc  or [address] [place] [address]

## 2022-06-14 NOTE — TELEPHONE ENCOUNTER
Upon review of the In Basket request we were able to note that no further action is required  The patient chart is up to date as a result of a previous request       Any additional questions or concerns should be emailed to the Practice Liaisons via [email or web address] email, please do not reply via In Basket      Thank you  Alex Zapien MA

## 2022-06-14 NOTE — PROGRESS NOTES
[place]    NAME: Oralia Aguila  AGE: 46 y o  SEX: female  : 1970     DATE: 2022     Assessment and Plan:     1  Type 2 diabetes mellitus without complication, without long-term current use of insulin ([facility] )    Most recent A1c was 6 9 % on 2021  Diabetes well controlled  Follows with endo  Due for repeat labs  2  Bipolar disorder, in full remission, most recent episode depressed ([facility] )    Stable on medications at this time  No mental health concerns  3  Hypothyroidism (acquired)    Continue Synthroid  Monitor TSH  4  Encounter for screening for lung cancer  5  Personal history of nicotine dependence    Patient is a current smoker or has quit smoking within the past 15 years  She has at least a 20 pack year smoking history  The risks and benefits of CT lung cancer screening were discussed with the patient including radiation exposure, false positive results and unnecessary invasive procedures  Benefits include early diagnosis and treatment and prevention of death  Shared decision making was completed  After discussion, patient agreed to lung cancer screening      - CT lung screening program; Future    6  Allergic rhinitis  - fluticasone (FLONASE) 50 mcg/act nasal spray; 1 spray into each nostril daily  Dispense: 48 g; Refill: 3      Tobacco Cessation Counseling: Tobacco cessation counseling was provided  The patient is sincerely urged to quit consumption of tobacco  She is not ready to quit tobacco       Return in about 6 months (around 2022) for Subsequent AWV  History of Present Illness:     Health is stable  Follows with endocrinology and getting lab tests done today  Most recent A1c was 6 9 % on 2021  Still continues to smoke unfortunately  Due for lung cancer screening  Review of Systems:     Review of Systems   Constitutional: Negative  Respiratory: Negative  Cardiovascular: Negative  Gastrointestinal: Negative  Objective:     /78 (BP Location: Left arm, Patient Position: Sitting, Cuff Size: Standard)   Pulse 70   Temp 98 °F (36 7 °C) (Tympanic)   Resp 20   Ht 5' 2" (1 575 m)   Wt 72 6 kg (160 lb)   SpO2 98%   BMI 29 26 kg/m²     Physical Exam  Constitutional:       General: She is not in acute distress  Appearance: She is not ill-appearing  Cardiovascular:      Rate and Rhythm: Normal rate and regular rhythm  Pulses: no weak pulses          Dorsalis pedis pulses are 2+ on the right side and 2+ on the left side  Posterior tibial pulses are 2+ on the right side and 2+ on the left side  Heart sounds: No murmur heard  Pulmonary:      Effort: Pulmonary effort is normal  No respiratory distress  Breath sounds: No wheezing  Abdominal:      General: Bowel sounds are normal  There is no distension  Tenderness: There is no abdominal tenderness  Musculoskeletal:      Right lower leg: No edema  Left lower leg: No edema  Feet:      Right foot:      Skin integrity: No ulcer, skin breakdown, erythema, warmth, callus or dry skin  Left foot:      Skin integrity: No ulcer, skin breakdown, erythema, warmth, callus or dry skin  Neurological:      Mental Status: She is alert  Diabetic Foot Exam  Patient's shoes and socks removed  Right Foot/Ankle   Right Foot Inspection  Skin Exam: skin normal and skin intact  No dry skin, no warmth, no callus, no erythema, no maceration, no abnormal color, no pre-ulcer, no ulcer and no callus  Toe Exam: ROM and strength within normal limits  Sensory   Proprioception: intact  Monofilament testing: intact    Vascular  Capillary refills: < 3 seconds  The right DP pulse is 2+  The right PT pulse is 2+  Left Foot/Ankle  Left Foot Inspection  Skin Exam: skin normal and skin intact  No dry skin, no warmth, no erythema, no maceration, normal color, no pre-ulcer, no ulcer and no callus       Toe Exam: ROM and strength within normal limits  Sensory   Proprioception: intact  Monofilament testing: intact    Vascular  Capillary refills: < 3 seconds  The left DP pulse is 2+  The left PT pulse is 2+       Assign Risk Category  No deformity present  No loss of protective sensation  No weak pulses  Risk: 0    Flor Zhao, DO  MEDICAL [id number]

## 2022-06-24 DIAGNOSIS — K21.9 GASTROESOPHAGEAL REFLUX DISEASE WITHOUT ESOPHAGITIS: ICD-10-CM

## 2022-06-24 RX ORDER — ESOMEPRAZOLE MAGNESIUM 40 MG/1
CAPSULE, DELAYED RELEASE ORAL
Qty: 180 CAPSULE | Refills: 3 | Status: SHIPPED | OUTPATIENT
Start: 2022-06-24

## 2022-08-25 LAB
LEFT EYE DIABETIC RETINOPATHY: NORMAL
RIGHT EYE DIABETIC RETINOPATHY: NORMAL

## 2022-08-26 ENCOUNTER — TELEPHONE (OUTPATIENT)
Dept: ADMINISTRATIVE | Facility: OTHER | Age: 52
End: 2022-08-26

## 2022-08-26 NOTE — TELEPHONE ENCOUNTER
Upon review of the In Basket request and the patient's chart, initial outreach has been made via fax, please see Contacts section for details       Thank you  Manjinder Raya MA

## 2022-08-26 NOTE — TELEPHONE ENCOUNTER
----- Message from Luana Roldan DO sent at 8/26/2022  8:32 AM EDT -----  Regarding: DM Eye Exam [facility] Internal Med  Contact: [phone]   08/26/22 8:32 AM    Hello, our patient Travis Cardona has had Diabetic Eye Exam completed/performed  Please assist in updating the patient chart by making an External outreach to [facility] facility located in [facility]  The date of service is 2022      Thank you,  Arcadio Severino DO  [facility]  [address]

## 2022-08-26 NOTE — LETTER
Diabetic Eye Exam Form    Date Requested: 22  Patient: Ronn Marie  Patient : 1970   Referring Provider: Nathaly Perrin DO    DIABETIC Eye Exam Date _______________________________    Type of Exam MUST be documented for Diabetic Eye Exams  Please CHECK ONE  Retinal Exam       Dilated Retinal Exam       OCT       Optomap-Iris Exam      Fundus Photography     Left Eye - Please check Retinopathy AND Type or No Retinopathy      Exam did show retinopathy    Exam did not show retinopathy         Mild     Proliferative           Moderate    Severe            None         Right Eye - Please check Retinopathy AND Type or No Retinopathy     Exam did show retinopathy    Exam did not show retinopathy         Mild     Proliferative        Moderate    Severe        None       Comments __________________________________________________________    Practice Providing Exam ______________________________________________    Exam Performed By (print name) _______________________________________      Provider Signature ___________________________________________________    These reports are needed for  compliance  Please fax this completed form and a copy of the Diabetic Eye Exam report to our office located at [place] [address] as soon as possible via [phone] attention [place]: Phone [phone]  We thank you for your assistance in treating our mutual patient     (sent to [facility])

## 2022-08-29 NOTE — TELEPHONE ENCOUNTER
Upon review of the In Basket request we were able to locate, review, and update the patient chart as requested for Diabetic Eye Exam     Any additional questions or concerns should be emailed to the Practice Liaisons via [email or web address] email, please do not reply via In Basket      Thank you  Diane Fallon MA

## 2023-02-13 ENCOUNTER — OFFICE VISIT (OUTPATIENT)
Dept: INTERNAL MEDICINE CLINIC | Facility: CLINIC | Age: 53
End: 2023-02-13

## 2023-02-13 VITALS
BODY MASS INDEX: 29.96 KG/M2 | RESPIRATION RATE: 16 BRPM | HEIGHT: 62 IN | DIASTOLIC BLOOD PRESSURE: 76 MMHG | WEIGHT: 162.8 LBS | OXYGEN SATURATION: 98 % | HEART RATE: 90 BPM | SYSTOLIC BLOOD PRESSURE: 124 MMHG | TEMPERATURE: 98 F

## 2023-02-13 DIAGNOSIS — E78.2 MIXED HYPERLIPIDEMIA DUE TO TYPE 2 DIABETES MELLITUS (HCC): ICD-10-CM

## 2023-02-13 DIAGNOSIS — E03.9 HYPOTHYROIDISM (ACQUIRED): ICD-10-CM

## 2023-02-13 DIAGNOSIS — E11.9 TYPE 2 DIABETES MELLITUS WITHOUT COMPLICATION, WITHOUT LONG-TERM CURRENT USE OF INSULIN (HCC): Primary | Chronic | ICD-10-CM

## 2023-02-13 DIAGNOSIS — E11.69 MIXED HYPERLIPIDEMIA DUE TO TYPE 2 DIABETES MELLITUS (HCC): ICD-10-CM

## 2023-02-13 DIAGNOSIS — Z00.00 MEDICARE ANNUAL WELLNESS VISIT, SUBSEQUENT: ICD-10-CM

## 2023-02-13 DIAGNOSIS — F31.76 BIPOLAR DISORDER, IN FULL REMISSION, MOST RECENT EPISODE DEPRESSED (HCC): ICD-10-CM

## 2023-02-13 DIAGNOSIS — Z12.31 ENCOUNTER FOR SCREENING MAMMOGRAM FOR BREAST CANCER: ICD-10-CM

## 2023-02-13 PROBLEM — N18.1 CKD (CHRONIC KIDNEY DISEASE), STAGE I: Status: RESOLVED | Noted: 2019-12-04 | Resolved: 2023-02-13

## 2023-02-13 NOTE — PATIENT INSTRUCTIONS
Medicare Preventive Visit Patient Instructions  Thank you for completing your Welcome to Medicare Visit or Medicare Annual Wellness Visit today  Your next wellness visit will be due in one year (2/14/2024)  The screening/preventive services that you may require over the next 5-10 years are detailed below  Some tests may not apply to you based off risk factors and/or age  Screening tests ordered at today's visit but not completed yet may show as past due  Also, please note that scanned in results may not display below  Preventive Screenings:  Service Recommendations Previous Testing/Comments   Colorectal Cancer Screening  * Colonoscopy    * Fecal Occult Blood Test (FOBT)/Fecal Immunochemical Test (FIT)  * Fecal DNA/Cologuard Test  * Flexible Sigmoidoscopy Age: 39-70 years old   Colonoscopy: every 10 years (may be performed more frequently if at higher risk)  OR  FOBT/FIT: every 1 year  OR  Cologuard: every 3 years  OR  Sigmoidoscopy: every 5 years  Screening may be recommended earlier than age 39 if at higher risk for colorectal cancer  Also, an individualized decision between you and your healthcare provider will decide whether screening between the ages of 74-80 would be appropriate  Colonoscopy: 08/24/2020  FOBT/FIT: Not on file  Cologuard: Not on file  Sigmoidoscopy: Not on file    Screening Current     Breast Cancer Screening Age: 36 years old  Frequency: every 1-2 years  Not required if history of left and right mastectomy Mammogram: 10/19/2021    Screening Current   Cervical Cancer Screening Between the ages of 21-29, pap smear recommended once every 3 years  Between the ages of 33-67, can perform pap smear with HPV co-testing every 5 years     Recommendations may differ for women with a history of total hysterectomy, cervical cancer, or abnormal pap smears in past  Pap Smear: 07/17/2015        Hepatitis C Screening Once for adults born between 1945 and 1965  More frequently in patients at high risk for Hepatitis C Hep C Antibody: Not on file        Diabetes Screening 1-2 times per year if you're at risk for diabetes or have pre-diabetes Fasting glucose: No results in last 5 years (No results in last 5 years)  A1C: 7 2 % (12/21/2022)  Screening Not Indicated  History Diabetes   Cholesterol Screening Once every 5 years if you don't have a lipid disorder  May order more often based on risk factors  Lipid panel: 12/21/2022    Screening Not Indicated  History Lipid Disorder     Other Preventive Screenings Covered by Medicare:  1  Abdominal Aortic Aneurysm (AAA) Screening: covered once if your at risk  You're considered to be at risk if you have a family history of AAA  2  Lung Cancer Screening: covers low dose CT scan once per year if you meet all of the following conditions: (1) Age 50-69; (2) No signs or symptoms of lung cancer; (3) Current smoker or have quit smoking within the last 15 years; (4) You have a tobacco smoking history of at least 20 pack years (packs per day multiplied by number of years you smoked); (5) You get a written order from a healthcare provider  3  Glaucoma Screening: covered annually if you're considered high risk: (1) You have diabetes OR (2) Family history of glaucoma OR (3)  aged 48 and older OR (3)  American aged 72 and older  3  Osteoporosis Screening: covered every 2 years if you meet one of the following conditions: (1) You're estrogen deficient and at risk for osteoporosis based off medical history and other findings; (2) Have a vertebral abnormality; (3) On glucocorticoid therapy for more than 3 months; (4) Have primary hyperparathyroidism; (5) On osteoporosis medications and need to assess response to drug therapy  · Last bone density test (DXA Scan): Not on file  5  HIV Screening: covered annually if you're between the age of 12-76  Also covered annually if you are younger than 13 and older than 72 with risk factors for HIV infection   For pregnant patients, it is covered up to 3 times per pregnancy  Immunizations:  Immunization Recommendations   Influenza Vaccine Annual influenza vaccination during flu season is recommended for all persons aged >= 6 months who do not have contraindications   Pneumococcal Vaccine   * Pneumococcal conjugate vaccine = PCV13 (Prevnar 13), PCV15 (Vaxneuvance), PCV20 (Prevnar 20)  * Pneumococcal polysaccharide vaccine = PPSV23 (Pneumovax) Adults 25-60 years old: 1-3 doses may be recommended based on certain risk factors  Adults 72 years old: 1-2 doses may be recommended based off what pneumonia vaccine you previously received   Hepatitis B Vaccine 3 dose series if at intermediate or high risk (ex: diabetes, end stage renal disease, liver disease)   Tetanus (Td) Vaccine - COST NOT COVERED BY MEDICARE PART B Following completion of primary series, a booster dose should be given every 10 years to maintain immunity against tetanus  Td may also be given as tetanus wound prophylaxis  Tdap Vaccine - COST NOT COVERED BY MEDICARE PART B Recommended at least once for all adults  For pregnant patients, recommended with each pregnancy  Shingles Vaccine (Shingrix) - COST NOT COVERED BY MEDICARE PART B  2 shot series recommended in those aged 48 and above     Health Maintenance Due:      Topic Date Due   • Hepatitis C Screening  Never done   • HIV Screening  Never done   • Cervical Cancer Screening  07/17/2020   • Lung Cancer Screening  07/30/2022   • Breast Cancer Screening: Mammogram  10/19/2022   • Colorectal Cancer Screening  08/24/2023     Immunizations Due:      Topic Date Due   • COVID-19 Vaccine (1) Never done   • Influenza Vaccine (1) Never done     Advance Directives   What are advance directives? Advance directives are legal documents that state your wishes and plans for medical care  These plans are made ahead of time in case you lose your ability to make decisions for yourself   Advance directives can apply to any medical decision, such as the treatments you want, and if you want to donate organs  What are the types of advance directives? There are many types of advance directives, and each state has rules about how to use them  You may choose a combination of any of the following:  · Living will: This is a written record of the treatment you want  You can also choose which treatments you do not want, which to limit, and which to stop at a certain time  This includes surgery, medicine, IV fluid, and tube feedings  · Durable power of  for healthcare [facility]): This is a written record that states who you want to make healthcare choices for you when you are unable to make them for yourself  This person, called a proxy, is usually a family member or a friend  You may choose more than 1 proxy  · Do not resuscitate (DNR) order:  A DNR order is used in case your heart stops beating or you stop breathing  It is a request not to have certain forms of treatment, such as CPR  A DNR order may be included in other types of advance directives  · Medical directive: This covers the care that you want if you are in a coma, near death, or unable to make decisions for yourself  You can list the treatments you want for each condition  Treatment may include pain medicine, surgery, blood transfusions, dialysis, IV or tube feedings, and a ventilator (breathing machine)  · Values history: This document has questions about your views, beliefs, and how you feel and think about life  This information can help others choose the care that you would choose  Why are advance directives important? An advance directive helps you control your care  Although spoken wishes may be used, it is better to have your wishes written down  Spoken wishes can be misunderstood, or not followed  Treatments may be given even if you do not want them  An advance directive may make it easier for your family to make difficult choices about your care     Cigarette Smoking and Your Health   Risks to your health if you smoke:  Nicotine and other chemicals found in tobacco damage every cell in your body  Even if you are a light smoker, you have an increased risk for cancer, heart disease, and lung disease  If you are pregnant or have diabetes, smoking increases your risk for complications  Benefits to your health if you stop smoking:   · You decrease respiratory symptoms such as coughing, wheezing, and shortness of breath  · You reduce your risk for cancers of the lung, mouth, throat, kidney, bladder, pancreas, stomach, and cervix  If you already have cancer, you increase the benefits of chemotherapy  You also reduce your risk for cancer returning or a second cancer from developing  · You reduce your risk for heart disease, blood clots, heart attack, and stroke  · You reduce your risk for lung infections, and diseases such as pneumonia, asthma, chronic bronchitis, and emphysema  · Your circulation improves  More oxygen can be delivered to your body  If you have diabetes, you lower your risk for complications, such as kidney, artery, and eye diseases  You also lower your risk for nerve damage  Nerve damage can lead to amputations, poor vision, and blindness  · You improve your body's ability to heal and to fight infections  For more information and support to stop smoking:   · [employer]  Phone: 8- 712 - 401-6798  Web Address: www [employer]  Weight Management   Why it is important to manage your weight:  Being overweight increases your risk of health conditions such as heart disease, high blood pressure, type 2 diabetes, and certain types of cancer  It can also increase your risk for osteoarthritis, sleep apnea, and other respiratory problems  Aim for a slow, steady weight loss  Even a small amount of weight loss can lower your risk of health problems    How to lose weight safely:  A safe and healthy way to lose weight is to eat fewer calories and get regular exercise  You can lose up about 1 pound a week by decreasing the number of calories you eat by 500 calories each day  Healthy meal plan for weight management:  A healthy meal plan includes a variety of foods, contains fewer calories, and helps you stay healthy  A healthy meal plan includes the following:  · Eat whole-grain foods more often  A healthy meal plan should contain fiber  Fiber is the part of grains, fruits, and vegetables that is not broken down by your body  Whole-grain foods are healthy and provide extra fiber in your diet  Some examples of whole-grain foods are whole-wheat breads and pastas, oatmeal, brown rice, and bulgur  · Eat a variety of vegetables every day  Include dark, leafy greens such as spinach, kale, samson greens, and mustard greens  Eat yellow and orange vegetables such as carrots, sweet potatoes, and winter squash  · Eat a variety of fruits every day  Choose fresh or canned fruit (canned in its own juice or light syrup) instead of juice  Fruit juice has very little or no fiber  · Eat low-fat dairy foods  Drink fat-free (skim) milk or 1% milk  Eat fat-free yogurt and low-fat cottage cheese  Try low-fat cheeses such as mozzarella and other reduced-fat cheeses  · Choose meat and other protein foods that are low in fat  Choose beans or other legumes such as split peas or lentils  Choose fish, skinless poultry (chicken or turkey), or lean cuts of red meat (beef or pork)  Before you cook meat or poultry, cut off any visible fat  · Use less fat and oil  Try baking foods instead of frying them  Add less fat, such as margarine, sour cream, regular salad dressing and mayonnaise to foods  Eat fewer high-fat foods  Some examples of high-fat foods include french fries, doughnuts, ice cream, and cakes  · Eat fewer sweets  Limit foods and drinks that are high in sugar  This includes candy, cookies, regular soda, and sweetened drinks    Exercise:  Exercise at least 30 minutes per day on most days of the week  Some examples of exercise include walking, biking, dancing, and swimming  You can also fit in more physical activity by taking the stairs instead of the elevator or parking farther away from stores  Ask your healthcare provider about the best exercise plan for you  © Copyright [employer] 2018 Information is for End User's use only and may not be sold, redistributed or otherwise used for commercial purposes   All illustrations and images included in CareNotes® are the copyrighted property of [employer]  or [address] [place] [address]

## 2023-02-13 NOTE — PROGRESS NOTES
Assessment and Plan:     1  Type 2 diabetes mellitus without complication, without long-term current use of insulin ([facility] )  2  Mixed hyperlipidemia due to type 2 diabetes mellitus ([facility] )    Follows with endocrinology  Most recent A1c was 7 2 % on 12/21/2022  Labs and medication ordered through them  Has future labs to get done before next appointment  Continue statin as prescribed  Work on diet/exercise  3  Bipolar disorder, in full remission, most recent episode depressed ([place] [id number] )    Stable and monitored/managed by psychiatry  4  Hypothyroidism (acquired)    Continue levothyroxine as prescribed  TSH is normal     5  Medicare annual wellness visit, subsequent    6  Encounter for screening mammogram for breast cancer  - Mammo screening bilateral w 3d & cad; Future     Preventive health issues were discussed with patient, and age appropriate screening tests were ordered as noted in patient's After Visit Summary  Personalized health advice and appropriate referrals for health education or preventive services given if needed, as noted in patient's After Visit Summary  History of Present Illness:     Patient presents for a Medicare Wellness Visit    Christophenoemi Haynes presents for follow-up  She is doing well  No changes with her health  Follows with endo for her diabetes and psychiatry with her bipolar disorder  Patient Care Team:  Keyla Woodard DO as PCP - General (Internal Medicine)  Keyla Woodard DO as PCP - [address] [place], [place] [address]Th [facility] ([facility])  Annabelle Jackson PA-C (Gastroenterology)     Review of Systems:     Review of Systems   Constitutional: Negative  Respiratory: Negative  Cardiovascular: Negative  Gastrointestinal: Negative  Musculoskeletal: Negative  Psychiatric/Behavioral: Positive for behavioral problems (stable)        Problem List:     Patient Active Problem List   Diagnosis   • Chronic gastroesophageal reflux disease   • Bipolar disorder ([facility] )   • Hypothyroidism (acquired)   • Irritable bowel syndrome with diarrhea   • Mixed hyperlipidemia due to type 2 diabetes mellitus (HCC)   • Nontoxic multinodular goiter   • Type 2 diabetes mellitus without complication, without long-term current use of insulin (HCC)   • Encounter for colonoscopy due to history of adenomatous colonic polyps      Past Medical and Surgical History:     Past Medical History:   Diagnosis Date   • Adrenal adenoma 2019   • Bipolar 1 disorder ([facility] )    • Colitis    • Diabetes mellitus ([facility] )    • Diabetic gastroparesis ([facility] ) 2016   • HLD (hyperlipidemia)      Past Surgical History:   Procedure Laterality Date   • APPENDECTOMY     •  SECTION     • CHOLECYSTECTOMY     • HERNIA REPAIR     • HYSTEROSCOPY W/ ENDOMETRIAL ABLATION  2015    Last assessed   • UTERINE FIBROID SURGERY     • WISDOM TOOTH EXTRACTION        Family History:     Family History   Problem Relation Age of Onset   • Lung cancer Mother    • Heart attack Father    • Heart failure Father    • Colon cancer Maternal Uncle    • Diabetes Maternal Grandmother    • Heart attack Maternal Grandmother    • Lung cancer Paternal Grandmother    • Heart disease Paternal Grandfather    • Cancer Maternal Uncle    • Heart attack Maternal Uncle       Social History:     Social History     Socioeconomic History   • Marital status:       Spouse name: None   • Number of children: None   • Years of education: None   • Highest education level: None   Occupational History   • None   Tobacco Use   • Smoking status: Every Day     Packs/day: 1 00     Years: 35 00     Pack years: 35 00     Types: Cigarettes     Start date: 1983   • Smokeless tobacco: Never   Vaping Use   • Vaping Use: Never used   Substance and Sexual Activity   • Alcohol use: Yes     Comment: occasionally   • Drug use: No   • Sexual activity: Not Currently   Other Topics Concern   • None   Social History Narrative   • None     Social Determinants of Health     Financial Resource Strain: Unknown   • Difficulty of Paying Living Expenses: Patient refused   Food Insecurity: Not on file   Transportation Needs: Unknown   • Lack of Transportation (Medical): Patient refused   • Lack of Transportation (Non-Medical): Patient refused   Physical Activity: Not on file   Stress: Not on file   Social Connections: Not on file   Intimate Partner Violence: Not on file   Housing Stability: Not on file      Medications and Allergies:     Current Outpatient Medications   Medication Sig Dispense Refill   • albuterol (PROVENTIL HFA,VENTOLIN HFA) 90 mcg/act inhaler Inhale 2 puffs every 6 (six) hours as needed for wheezing or shortness of breath 1 Inhaler 5   • ALPRAZolam (XANAX) 0 5 mg tablet Take 0 5 mg by mouth 3 (three) times a day as needed     • DULoxetine (CYMBALTA) 60 mg delayed release capsule Take 60 mg by mouth daily       • Empagliflozin (Jardiance) 25 MG TABS Take 25 mg by mouth every morning     • esomeprazole (NexIUM) 40 MG capsule TAKE 1 CAPSULE TWICE A  capsule 3   • fluticasone (FLONASE) 50 mcg/act nasal spray 1 spray into each nostril daily 48 g 3   • glimepiride (AMARYL) 1 mg tablet Take 1 mg by mouth 2 (two) times a day      • metFORMIN (GLUCOPHAGE) 500 mg tablet Take 500 mg by mouth 2 (two) times a day     • simvastatin (ZOCOR) 40 mg tablet Take 40 mg by mouth daily       • SYNTHROID 50 MCG tablet Take 50 mcg by mouth daily         No current facility-administered medications for this visit  No Known Allergies   Immunizations: There is no immunization history on file for this patient     Health Maintenance:         Topic Date Due   • Hepatitis C Screening  Never done   • HIV Screening  Never done   • Cervical Cancer Screening  07/17/2020   • Lung Cancer Screening  07/30/2022   • Breast Cancer Screening: Mammogram  10/19/2022   • Colorectal Cancer Screening  08/24/2023         Topic Date Due   • COVID-19 Vaccine (1) Never done   • Influenza Vaccine (1) Never done      Medicare Screening Tests and Risk Assessments:     Jesu Mclean is here for her Subsequent Wellness visit  Last Medicare Wellness visit information reviewed, patient interviewed and updates made to the record today  Health Risk Assessment:   Patient rates overall health as fair  Patient feels that their physical health rating is same  Patient is satisfied with their life  Eyesight was rated as same  Hearing was rated as slightly worse  Patient feels that their emotional and mental health rating is same  Patients states they are sometimes angry  Patient states they are often unusually tired/fatigued  Pain experienced in the last 7 days has been none  Patient states that she has experienced no weight loss or gain in last 6 months  Depression Screening:   PHQ-2 Score: 2      Fall Risk Screening: In the past year, patient has experienced: no history of falling in past year      Urinary Incontinence Screening:   Patient has not leaked urine accidently in the last six months  Home Safety:  Patient does not have trouble with stairs inside or outside of their home  Patient has working smoke alarms and has working carbon monoxide detector  Home safety hazards include: none  Nutrition:   Current diet is Diabetic  Medications:   Patient is not currently taking any over-the-counter supplements  Patient is able to manage medications  Activities of Daily Living (ADLs)/Instrumental Activities of Daily Living (IADLs):   Walk and transfer into and out of bed and chair?: Yes  Dress and groom yourself?: Yes    Bathe or shower yourself?: Yes    Feed yourself? Yes  Do your laundry/housekeeping?: Yes  Manage your money, pay your bills and track your expenses?: Yes  Make your own meals?: Yes    Do your own shopping?: Yes    Durable Medical Equipment Suppliers  none    Previous Hospitalizations:   Any hospitalizations or ED visits within the last 12 months?: No      Advance Care Planning:   Living will: No    Durable POA for healthcare:  No Advanced directive: No    Five wishes given: No      Cognitive Screening:   Provider or family/friend/caregiver concerned regarding cognition?: No    PREVENTIVE SCREENINGS      Cardiovascular Screening:    General: Screening Not Indicated and History Lipid Disorder      Diabetes Screening:     General: Screening Not Indicated and History Diabetes      Colorectal Cancer Screening:     General: Screening Current      Breast Cancer Screening:     General: Screening Current      Cervical Cancer Screening:    General: Risks and Benefits Discussed    Due for: Cervical Pap Smear      Osteoporosis Screening:    General: Screening Not Indicated      Abdominal Aortic Aneurysm (AAA) Screening:        General: Screening Not Indicated      Lung Cancer Screening:     General: Risks and Benefits Discussed    Due for: Low Dose CT (LDCT)    Screening, Brief Intervention, and Referral to Treatment (SBIRT)    Screening  Typical number of drinks in a day: 0  Typical number of drinks in a week: 0  Interpretation: Low risk drinking behavior  AUDIT-C Screenin) How often did you have a drink containing alcohol in the past year? never  2) How many drinks did you have on a typical day when you were drinking in the past year? 0  3) How often did you have 6 or more drinks on one occasion in the past year? never    AUDIT-C Score: 0  Interpretation: Score 0-2 (female): Negative screen for alcohol misuse    Single Item Drug Screening:  How often have you used an illegal drug (including marijuana) or a prescription medication for non-medical reasons in the past year? never    Single Item Drug Screen Score: 0  Interpretation: Negative screen for possible drug use disorder    Other Counseling Topics:   Car/seat belt/driving safety, skin self-exam, sunscreen and regular weightbearing exercise        Physical Exam:     /76 (BP Location: Left arm, Patient Position: Sitting, Cuff Size: Standard)   Pulse 90   Temp 98 °F (36 7 °C) (Tympanic)   Resp 16   Ht 5' 2" (1 575 m)   Wt 73 8 kg (162 lb 12 8 oz)   SpO2 98%   BMI 29 78 kg/m²     Physical Exam  Constitutional:       General: She is not in acute distress  Appearance: She is not ill-appearing  Cardiovascular:      Rate and Rhythm: Normal rate and regular rhythm  Pulses: no weak pulses          Dorsalis pedis pulses are 2+ on the right side and 2+ on the left side  Posterior tibial pulses are 2+ on the right side and 2+ on the left side  Heart sounds: No murmur heard  Pulmonary:      Effort: Pulmonary effort is normal  No respiratory distress  Breath sounds: No wheezing  Abdominal:      General: Bowel sounds are normal  There is no distension  Tenderness: There is no abdominal tenderness  Musculoskeletal:      Right lower leg: No edema  Left lower leg: No edema  Feet:      Right foot:      Skin integrity: No ulcer, skin breakdown, erythema, warmth, callus or dry skin  Left foot:      Skin integrity: No ulcer, skin breakdown, erythema, warmth, callus or dry skin  Neurological:      Mental Status: She is alert  Diabetic Foot Exam  Patient's shoes and socks removed  Right Foot/Ankle   Right Foot Inspection  Skin Exam: skin normal and skin intact  No dry skin, no warmth, no callus, no erythema, no maceration, no abnormal color, no pre-ulcer, no ulcer and no callus  Toe Exam: ROM and strength within normal limits  Sensory   Proprioception: intact  Monofilament testing: intact    Vascular  Capillary refills: < 3 seconds  The right DP pulse is 2+  The right PT pulse is 2+  Left Foot/Ankle  Left Foot Inspection  Skin Exam: skin normal and skin intact  No dry skin, no warmth, no erythema, no maceration, normal color, no pre-ulcer, no ulcer and no callus  Toe Exam: ROM and strength within normal limits       Sensory   Proprioception: intact  Monofilament testing: intact    Vascular  Capillary refills: < 3 seconds  The left DP pulse is 2+  The left PT pulse is 2+       Assign Risk Category  No deformity present  No loss of protective sensation  No weak pulses  Risk: 0    Arcadio Severino, DO

## 2023-02-16 ENCOUNTER — HOSPITAL ENCOUNTER (OUTPATIENT)
Dept: CT IMAGING | Facility: CLINIC | Age: 53
Discharge: HOME | End: 2023-02-16

## 2023-02-16 DIAGNOSIS — Z87.891 PERSONAL HISTORY OF NICOTINE DEPENDENCE: ICD-10-CM

## 2023-02-16 DIAGNOSIS — Z12.2 ENCOUNTER FOR SCREENING FOR LUNG CANCER: ICD-10-CM

## 2023-04-04 ENCOUNTER — TELEPHONE (OUTPATIENT)
Age: 53
End: 2023-04-04

## 2023-04-04 NOTE — TELEPHONE ENCOUNTER
Patient received letter from Bo Patel in regards to results on CT SCAN       Requesting cb on results

## 2023-04-05 ENCOUNTER — OFFICE VISIT (OUTPATIENT)
Dept: OBGYN CLINIC | Facility: MEDICAL CENTER | Age: 53
End: 2023-04-05

## 2023-04-05 VITALS
HEIGHT: 62 IN | WEIGHT: 156.2 LBS | BODY MASS INDEX: 28.74 KG/M2 | DIASTOLIC BLOOD PRESSURE: 90 MMHG | SYSTOLIC BLOOD PRESSURE: 134 MMHG

## 2023-04-05 DIAGNOSIS — N89.8 LEUKORRHEA: ICD-10-CM

## 2023-04-05 DIAGNOSIS — N90.7 VULVAR CYST: Primary | ICD-10-CM

## 2023-04-05 DIAGNOSIS — N95.0 PMB (POSTMENOPAUSAL BLEEDING): ICD-10-CM

## 2023-04-05 RX ORDER — MELOXICAM 15 MG/1
15 TABLET ORAL DAILY
COMMUNITY
Start: 2023-03-27

## 2023-04-05 NOTE — ASSESSMENT & PLAN NOTE
Small Cystic lesion on vulva/mons  <1cm   Draining serous fluid when squeezed  No induration or erythema  Scarring noted from previous occurrences  This has been recurrent for years  Offered to do formal I&D in office and declines stating she want the whole cyst removed     Beyond my scope and will refer to physician

## 2023-04-05 NOTE — PROGRESS NOTES
"Assessment/Plan:       1  Vulvar cyst  Assessment & Plan:  Small Cystic lesion on vulva/mons  <1cm   Draining serous fluid when squeezed  No induration or erythema  Scarring noted from previous occurrences  This has been recurrent for years  Offered to do formal I&D in office and declines stating she want the whole cyst removed  Beyond my scope and will refer to physician      2  PMB (postmenopausal bleeding)  Assessment & Plan:  Pt with c/o PMB  Also intermittent for  Years  Reviewed w/u for same includes endometrial sampling, typically via EMB to r/u Cancer/precancerous changed  Offered to do EMB today vs check US then consider EMB  If US shows ET <4mm there is a 99% negative predictive value for EM cancer  Given prior hx of diffucult EMB and Scant sample- will check US first and f/u with Physician for possible D&C hysteroscopy      Orders:  -     Follicle stimulating hormone; Future  -     Estradiol; Future  -     US pelvis complete w transvaginal; Future; Expected date: 04/05/2023    3  Leukorrhea  -     Chlamydia/GC amplified DNA by PCR  -     Molecular Vaginal Panel          Subjective:      Patient ID: Tierra Hicks is a 45 y o  female  She is here for Multiple Concerns (Patient states she has a vaginal cyst that has been present for the last 16 years \"off and on\" but the last 6 months it has not gone away and has become progressively more painful  Also reports some light vaginal spotting  Notices only when she wipes and when she is constipated/pushing to move her bowels  Denies, vaginal pain, abnormal discharge, itching, odor  Not sexually active  Has not seen obgyn care for many years )    HPI  No recent GYN care  Pt with 2 major complaints  1) has a recurrent labial cyst that comes and goes  Now present x 6 months- does drain white fluid  mildy painful  Recently squeezed it and drained some and is smaller today  2)  also c/o pink vaginal bleeding intermittently   Mostly after having BM  - IS " "certain it is vaginal   Hx of uterine ablation  Presumed menopausal  Had a similar episode 5 yrs ago  And scanty  EMB and never followed up  Hormonal testing 3 years ago in menopausal range  Not sexually active  Menstrual History:  No LMP recorded  Patient is postmenopausal           The following portions of the patient's history were reviewed and updated as appropriate: allergies, current medications, past family history, past medical history, past social history, past surgical history, and problem list     Review of Systems  See HPI for pertinent positives          Objective:    /90   Ht 5' 2\" (1 575 m)   Wt 70 9 kg (156 lb 3 2 oz)   BMI 28 57 kg/m²      Physical Exam  Constitutional:       General: She is not in acute distress  Appearance: Normal appearance  Genitourinary:      Urethral meatus normal       No lesions in the vagina  Right Labia: No rash, lesions or skin changes  Left Labia: No lesions, skin changes or rash  Vaginal discharge (large amt off white homogeneous d/c) present  No vaginal erythema, tenderness, bleeding or ulceration  Right Adnexa: not tender, not full and no mass present  Left Adnexa: not tender, not full and no mass present  No cervical motion tenderness, discharge, friability or lesion  Uterus is not enlarged or tender  Bladder is not tender  Pelvic exam was performed with patient in the lithotomy position  Rectum:      No external hemorrhoid  HENT:      Head: Normocephalic  Cardiovascular:      Rate and Rhythm: Normal rate  Pulmonary:      Effort: Pulmonary effort is normal    Neurological:      Mental Status: She is alert and oriented to person, place, and time  Psychiatric:         Mood and Affect: Mood normal          Behavior: Behavior normal    Vitals reviewed               "

## 2023-04-06 LAB
C GLABRATA DNA VAG QL NAA+PROBE: NEGATIVE
C KRUSEI DNA VAG QL NAA+PROBE: NEGATIVE
CANDIDA SP 6 PNL VAG NAA+PROBE: NEGATIVE
T VAGINALIS DNA VAG QL NAA+PROBE: NEGATIVE
VAGINOSIS/ITIS DNA PNL VAG PROBE+SIG AMP: NEGATIVE

## 2023-04-07 LAB
C TRACH DNA SPEC QL NAA+PROBE: NEGATIVE
N GONORRHOEA DNA SPEC QL NAA+PROBE: NEGATIVE

## 2023-04-25 ENCOUNTER — LAB (OUTPATIENT)
Age: 53
End: 2023-04-25

## 2023-04-25 ENCOUNTER — OFFICE VISIT (OUTPATIENT)
Age: 53
End: 2023-04-25

## 2023-04-25 VITALS
DIASTOLIC BLOOD PRESSURE: 82 MMHG | HEIGHT: 62 IN | BODY MASS INDEX: 29.33 KG/M2 | SYSTOLIC BLOOD PRESSURE: 120 MMHG | WEIGHT: 159.4 LBS

## 2023-04-25 DIAGNOSIS — N95.0 PMB (POSTMENOPAUSAL BLEEDING): ICD-10-CM

## 2023-04-25 DIAGNOSIS — L72.0 EPIDERMOID CYST OF SKIN OF INGUINAL REGION: Primary | ICD-10-CM

## 2023-04-25 RX ORDER — MELOXICAM 15 MG/1
TABLET ORAL
COMMUNITY
Start: 2023-04-24

## 2023-04-25 NOTE — PROGRESS NOTES
"Assessment/Plan:       Problem List Items Addressed This Visit    None  Visit Diagnoses     Epidermoid cyst of skin of inguinal region    -  Primary    Relevant Orders    Ambulatory Referral to Gynecologic Oncology          - discussed that I would feel comfortable with performing I&D of this cyst, but as she wants the entire cyst and cyst wall removed, I would be uncomfortable with proximity to inguinal region (nerves and vessels) just below the cyst  I recommend consultation with a surgeon with experience in this anatomical area  Recommend consultation with gyn onc vs  general surgery and she prefers gyn oncology  Referral placed  Subjective:      Patient ID: Maxine Choi is a 45 y o  female  HPI  She presents today for preoperative consultation for removal of a cyst of the mons pubis  She has had this cyst for 16 years  She was previously seen by one of our advanced practitioners and discussed I&D vs removal  She is interested only in removal of the entire cyst and cyst wall  She drains it regularly as it grows continuously and becomes very painful if not drained  Cyst is best visualized in supine position  The following portions of the patient's history were reviewed and updated as appropriate: allergies, current medications, past family history, past medical history, past social history, past surgical history and problem list     Review of Systems  as above    Objective:  /82 (BP Location: Left arm, Patient Position: Sitting, Cuff Size: Standard)   Ht 5' 2\" (1 575 m)   Wt 72 3 kg (159 lb 6 4 oz)   BMI 29 15 kg/m²      Physical Exam  Vitals reviewed  Constitutional:       Appearance: She is well-developed  HENT:      Head: Normocephalic  Cardiovascular:      Rate and Rhythm: Normal rate and regular rhythm  Pulmonary:      Effort: Pulmonary effort is normal    Abdominal:      Palpations: Abdomen is soft  Musculoskeletal:         General: Normal range of motion        " Cervical back: Normal range of motion  Skin:     General: Skin is warm and dry  Neurological:      Mental Status: She is alert and oriented to person, place, and time     Psychiatric:         Behavior: Behavior normal

## 2023-04-26 LAB
ESTRADIOL SERPL-MCNC: <11 PG/ML
FSH SERPL-ACNC: 52.6 MIU/ML

## 2023-04-27 ENCOUNTER — TELEPHONE (OUTPATIENT)
Dept: GYNECOLOGIC ONCOLOGY | Facility: CLINIC | Age: 53
End: 2023-04-27

## 2023-04-27 NOTE — TELEPHONE ENCOUNTER
Called patient and scheduled a consult appointment with Dr Tasha Mason on 5/23/23 in [facility]  Patient was agreeable to date/time/location

## 2023-05-05 ENCOUNTER — HOSPITAL ENCOUNTER (OUTPATIENT)
Dept: ULTRASOUND IMAGING | Facility: HOSPITAL | Age: 53
End: 2023-05-05

## 2023-05-05 DIAGNOSIS — N95.0 PMB (POSTMENOPAUSAL BLEEDING): ICD-10-CM

## 2023-05-12 ENCOUNTER — TELEPHONE (OUTPATIENT)
Dept: OBGYN CLINIC | Facility: CLINIC | Age: 53
End: 2023-05-12

## 2023-05-12 NOTE — TELEPHONE ENCOUNTER
----- Message from Chano Davis, [address] sent at 5/12/2023 10:25 AM EDT -----  US is showing stable fibroids  ET is very thin at 3mm  Recommend physician consult due to PMB

## 2023-05-23 ENCOUNTER — CONSULT (OUTPATIENT)
Dept: GYNECOLOGIC ONCOLOGY | Facility: CLINIC | Age: 53
End: 2023-05-23

## 2023-05-23 VITALS
WEIGHT: 160 LBS | HEIGHT: 62 IN | HEART RATE: 83 BPM | TEMPERATURE: 97.9 F | DIASTOLIC BLOOD PRESSURE: 86 MMHG | BODY MASS INDEX: 29.44 KG/M2 | OXYGEN SATURATION: 96 % | SYSTOLIC BLOOD PRESSURE: 128 MMHG | RESPIRATION RATE: 18 BRPM

## 2023-05-23 DIAGNOSIS — L72.0 EPIDERMOID CYST OF SKIN OF INGUINAL REGION: ICD-10-CM

## 2023-05-23 NOTE — ASSESSMENT & PLAN NOTE
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   The patient has no other recurrent cysts of the it is unlikely that she has hidradenitis suppurativa  If this is 1 single area of recurrence it is reasonable to resect it  We have discussed drainage but the patient is already doing that  She has used antibiotics in the past with minimal help  We recommended simple partial vulvectomy we discussed risks and benefits including bleeding requiring transfusion infection and damage to local structures as well as recurrence of cystic structure once removal is complete and cosmetic change  The patient understands accepts the risk of surgery and has signed an informed consent  Risk of infection is higher in this lesion due to the patient's ongoing diabetes and smoking  Preoperative test including chest x-ray EKG routine blood work will be ordered  We recommended chlorhexidine wipes prior to surgery  We will move ahead with surgery in the next few weeks

## 2023-05-23 NOTE — PROGRESS NOTES
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   The patient has no other recurrent cysts of the it is unlikely that she has hidradenitis suppurativa  If this is 1 single area of recurrence it is reasonable to resect it  We have discussed drainage but the patient is already doing that  She has used antibiotics in the past with minimal help  We recommended simple partial vulvectomy we discussed risks and benefits including bleeding requiring transfusion infection and damage to local structures as well as recurrence of cystic structure once removal is complete and cosmetic change  The patient understands accepts the risk of surgery and has signed an informed consent  Risk of infection is higher in this lesion due to the patient's ongoing diabetes and smoking  Preoperative test including chest x-ray EKG routine blood work will be ordered  We recommended chlorhexidine wipes prior to surgery  We will move ahead with surgery in the next few weeks  Relevant Orders    Case request operating room: VULVECTOMY SIMPLE partial (Completed)    Type and screen    CBC and differential    Basic metabolic panel    EKG 12 lead    XR chest pa & lateral    HEMOGLOBIN A1C W/ EAG ESTIMATION       Have recommended holding metformin and Empagliflozen the day of surgery      CHIEF COMPLAINT: Vulvar lesion          Patient ID: Jhonny Kang is a 45 y o  female  Patient is a very pleasant 61-year-old female who presents today for preoperative consultation for removal of a cyst of the mons pubis      She has had this cyst for 16 years  She is interested only in removal of the entire cyst and cyst wall  She drains it regularly as it grows continuously and becomes very painful if not drained  Cyst is best visualized in supine position  The patient has been draining the cyst every day for approximately 6 months  Prior to that she notes that intermittently  Over the past week or so it has started to clear  The patient reports that pain can be enough that it hurts to wear underwear and she would like the cyst removed  She has no other complaint  Today, the patient is doing well  She denies significant abdominal pain, pelvic pain, nausea, vomiting, constipation, diarrhea, fevers, chills, or vaginal bleeding  Denies any further infections in other areas of the vulva or armpit regions      Review of Systems   Constitutional: Negative  HENT: Negative  Eyes: Negative  Respiratory: Negative  Cardiovascular: Negative  Gastrointestinal: Negative  Endocrine: Negative  Genitourinary: Negative  Musculoskeletal: Negative  Skin: Negative  Neurological: Negative  Hematological: Negative  Psychiatric/Behavioral: Negative          Current Outpatient Medications   Medication Sig Dispense Refill   • albuterol (PROVENTIL HFA,VENTOLIN HFA) 90 mcg/act inhaler Inhale 2 puffs every 6 (six) hours as needed for wheezing or shortness of breath 1 Inhaler 5   • ALPRAZolam (XANAX) 0 5 mg tablet Take 0 5 mg by mouth 3 (three) times a day as needed     • DULoxetine (CYMBALTA) 60 mg delayed release capsule Take 60 mg by mouth daily       • Empagliflozin 25 MG TABS Take 25 mg by mouth every morning     • esomeprazole (NexIUM) 40 MG capsule TAKE 1 CAPSULE TWICE A  capsule 3   • fluticasone (FLONASE) 50 mcg/act nasal spray 1 spray into each nostril daily 48 g 3   • glimepiride (AMARYL) 1 mg tablet Take 1 mg by mouth 2 (two) times a day      • metFORMIN (GLUCOPHAGE) 500 mg tablet Take 500 mg by mouth 2 (two) times a day     • simvastatin (ZOCOR) 40 mg tablet Take 40 mg by mouth daily       • SYNTHROID 50 MCG tablet Take 50 mcg by mouth daily       • meloxicam (MOBIC) 15 mg tablet  (Patient not taking: Reported on 2023)     • valACYclovir (VALTREX) 1,000 mg tablet Take 1 tablet (1,000 mg total) by mouth 2 (two) times a day for 7 days 14 tablet 0     No current facility-administered medications for this visit         No Known Allergies    Past Medical History:   Diagnosis Date   • Adrenal adenoma 2019   • Bipolar 1 disorder ([facility] [address] )    • Colitis    • Diabetes mellitus [address] [address] )    • [facility]   • Fibroid    • GERD (gastroesophageal reflux disease)    • Gestational diabetes    • HLD (hyperlipidemia)    • Skin cancer    • Varicella 1970's       Past Surgical History:   Procedure Laterality Date   • APPENDECTOMY     •  SECTION     • CHOLECYSTECTOMY     • HERNIA REPAIR     • HYSTEROSCOPY W/ ENDOMETRIAL ABLATION  2015    Last assessed   • UTERINE FIBROID SURGERY     • WISDOM TOOTH EXTRACTION         OB History        3    Para   3    Term   2       1    AB   0    Living   2       SAB   0    IAB   0    Ectopic        Multiple   0    Live Births   2                 Family History   Problem Relation Age of Onset   • Lung cancer Mother    • Cancer Mother         small cell lung   • Melanoma Mother    • Heart attack Father    • Heart failure Father    • Heart disease Father    • Colon cancer Maternal Uncle    • Heart attack Maternal Uncle    • Diabetes Maternal Grandmother    • Heart attack Maternal Grandmother    • Lung cancer Paternal Grandmother    • Heart disease Paternal Grandfather        The following portions of the patient's history were reviewed and updated as appropriate: allergies, current medications, past family history, past social "history, past surgical history and problem list       Objective:    Blood pressure 128/86, pulse 83, temperature 97 9 °F (36 6 °C), temperature source Temporal, resp  rate 18, height 5' 2\" (1 575 m), weight 72 6 kg (160 lb), SpO2 96 %, not currently breastfeeding  Body mass index is 29 26 kg/m²  Physical Exam  Constitutional:       Appearance: She is well-developed  HENT:      Head: Normocephalic and atraumatic  Eyes:      Pupils: Pupils are equal, round, and reactive to light  Cardiovascular:      Rate and Rhythm: Normal rate and regular rhythm  Heart sounds: Normal heart sounds  Pulmonary:      Effort: Pulmonary effort is normal  No respiratory distress  Breath sounds: Normal breath sounds  Abdominal:      General: Bowel sounds are normal  There is no distension  Palpations: Abdomen is soft  Abdomen is not rigid  Tenderness: There is no abdominal tenderness  There is no guarding or rebound  Genitourinary:         Comments: -Normal external female genitalia with the exception of a 2 x 2 centimeter discolored minimally palpable lesion in the left mons, normal Bartholin's and [place]'s glands                  -Normal midline urethral meatus  No lesions notes                  -Bladder without fullness mass or tenderness                  -Vagina without lesion or discharge No significant cystocele or rectocele noted                  -Cervix normal appearing without visible lesions                  -Uterus with normal contour, mobility  No tenderness,                  -Adnexae without  mass or tenderness                  - Anus without fissure of lesion    Musculoskeletal:         General: Normal range of motion  Cervical back: Normal range of motion and neck supple  Lymphadenopathy:      Cervical: No cervical adenopathy  Upper Body:      Right upper body: No supraclavicular adenopathy  Left upper body: No supraclavicular adenopathy     Skin:     General: Skin is warm " and dry  Neurological:      Mental Status: She is alert and oriented to person, place, and time     Psychiatric:         Behavior: Behavior normal            No results found for:   No results found for: WBC, HGB, HCT, MCV, PLT  No results found for: NA, K, CL, CO2, ANIONGAP, BUN, CREATININE, GLUCOSE, GLUF, CALCIUM, CORRECTEDCA, AST, ALT, ALKPHOS, PROT, BILITOT, EGFR     Trend:  No results found for:

## 2023-06-12 ENCOUNTER — HOSPITAL ENCOUNTER (OUTPATIENT)
Age: 53
Discharge: HOME | End: 2023-06-12
Payer: COMMERCIAL

## 2023-06-12 VITALS — HEIGHT: 62 IN | BODY MASS INDEX: 29.44 KG/M2 | WEIGHT: 160 LBS

## 2023-06-12 DIAGNOSIS — Z12.31 ENCOUNTER FOR SCREENING MAMMOGRAM FOR BREAST CANCER: ICD-10-CM

## 2023-06-12 PROCEDURE — 77063 BREAST TOMOSYNTHESIS BI: CPT

## 2023-06-12 PROCEDURE — 77067 SCR MAMMO BI INCL CAD: CPT

## 2023-06-13 ENCOUNTER — OFFICE VISIT (OUTPATIENT)
Age: 53
End: 2023-06-13
Payer: COMMERCIAL

## 2023-06-13 VITALS
BODY MASS INDEX: 29.44 KG/M2 | HEIGHT: 62 IN | DIASTOLIC BLOOD PRESSURE: 82 MMHG | WEIGHT: 160 LBS | SYSTOLIC BLOOD PRESSURE: 124 MMHG

## 2023-06-13 DIAGNOSIS — N95.0 PMB (POSTMENOPAUSAL BLEEDING): Primary | ICD-10-CM

## 2023-06-13 PROCEDURE — 99213 OFFICE O/P EST LOW 20 MIN: CPT | Performed by: STUDENT IN AN ORGANIZED HEALTH CARE EDUCATION/TRAINING PROGRAM

## 2023-06-13 NOTE — PROGRESS NOTES
"Assessment/Plan:       Problem List Items Addressed This Visit        Other    PMB (postmenopausal bleeding) - Primary       -Discussed etiology, evaluation, management of postmenopausal bleeding  Most common etiology atrophy, also possibly structural ie polyps, however always possible that hyperplasia or malignancy is the cause  Discussed that thin endometrial lining, <5mm, is very reassuring for absence of hyperplasia or cancer as the cause, that atrophy is most likely in this situation  However, discussed that with persistent spotting, evaluation would be complete with hysteroscopy, D&C  She feels very confident that her spotting is secondary to atrophy and chooses to defer D&C at this time  She will reach out if her bleeding becomes heavier or if she changes her mind, plans to continue routine care  Subjective:      Patient ID: Sophy Flores is a 45 y o  female  HPI  She is here for discussion regarding persistent pink vaginal discharge for several years  She has known uterine fibroids, underwent ablation in the distant past  She had ensured the spotting is vaginal, notes that it occurs most frequently when she is constipated, after a bowel movement  She is not sexually active  The following portions of the patient's history were reviewed and updated as appropriate: allergies, current medications, past family history, past medical history, past social history, past surgical history and problem list     Review of Systems  as above    Objective:  /82 (BP Location: Left arm, Patient Position: Sitting, Cuff Size: Standard)   Ht 5' 2\" (1 575 m)   Wt 72 6 kg (160 lb)   BMI 29 26 kg/m²      Physical Exam  Vitals reviewed  Constitutional:       Appearance: She is well-developed  HENT:      Head: Normocephalic  Pulmonary:      Effort: Pulmonary effort is normal    Musculoskeletal:         General: Normal range of motion  Cervical back: Normal range of motion     Neurological:      " Mental Status: She is alert and oriented to person, place, and time     Psychiatric:         Behavior: Behavior normal

## 2023-06-14 ENCOUNTER — TELEPHONE (OUTPATIENT)
Age: 53
End: 2023-06-14

## 2023-06-14 NOTE — TELEPHONE ENCOUNTER
----- Message from Jasmin Haro DO sent at 6/14/2023  9:16 AM EDT -----  Let patient know mammogram was normal

## 2023-07-20 DIAGNOSIS — K21.9 GASTROESOPHAGEAL REFLUX DISEASE WITHOUT ESOPHAGITIS: ICD-10-CM

## 2023-07-21 RX ORDER — ESOMEPRAZOLE MAGNESIUM 40 MG/1
CAPSULE, DELAYED RELEASE ORAL
Qty: 180 CAPSULE | Refills: 3 | Status: SHIPPED | OUTPATIENT
Start: 2023-07-21

## 2023-08-10 ENCOUNTER — TELEPHONE (OUTPATIENT)
Dept: HEMATOLOGY ONCOLOGY | Facility: CLINIC | Age: 53
End: 2023-08-10

## 2023-08-10 NOTE — TELEPHONE ENCOUNTER
Appointment Change  Cancel, Reschedule, Change to Virtual      Who are you speaking with? Patient   If it is not the patient, are they listed on an active communication consent form? N/A   Which provider is the appointment scheduled with? Dr. Ed Gutierrez   When is the appointment scheduled? Please list date and time 9/11/23 1:45PM   At which location is the appointment scheduled to take place? [facility])   Was the appointment rescheduled or changed from an in person visit to a virtual visit? If so, please list the details of the change. 9/20/23 1:45PM   What is the reason for the appointment change? Provider   Was [facility] transport scheduled for this visit? No   Does [facility] transport need to be scheduled for the new visit (if applicable) No   Does the patient need an infusion appointment rescheduled? No   Does the patient have an infusion appointment scheduled? If so, when? No   Is the patient undergoing chemotherapy? No   Was the no-show policy reviewed for appointments being changed with less then 24 hours of notice?  No

## 2023-08-14 ENCOUNTER — HOSPITAL ENCOUNTER (OUTPATIENT)
Dept: RADIOLOGY | Facility: HOSPITAL | Age: 53
Discharge: HOME | End: 2023-08-14
Payer: COMMERCIAL

## 2023-08-14 ENCOUNTER — OFFICE VISIT (OUTPATIENT)
Dept: LAB | Facility: HOSPITAL | Age: 53
End: 2023-08-14
Payer: COMMERCIAL

## 2023-08-14 ENCOUNTER — APPOINTMENT (OUTPATIENT)
Dept: LAB | Facility: HOSPITAL | Age: 53
End: 2023-08-14
Payer: COMMERCIAL

## 2023-08-14 DIAGNOSIS — L72.0 EPIDERMOID CYST OF SKIN OF INGUINAL REGION: ICD-10-CM

## 2023-08-14 DIAGNOSIS — Z01.818 PRE-OP EVALUATION: ICD-10-CM

## 2023-08-14 DIAGNOSIS — J18.9 PNEUMONIA DUE TO INFECTIOUS ORGANISM, UNSPECIFIED LATERALITY, UNSPECIFIED PART OF LUNG: ICD-10-CM

## 2023-08-14 LAB
BASOPHILS # BLD AUTO: 0.05 THOUSANDS/ÂΜL (ref 0–0.1)
BASOPHILS NFR BLD AUTO: 1 % (ref 0–1)
EOSINOPHIL # BLD AUTO: 0.09 THOUSAND/ÂΜL (ref 0–0.61)
EOSINOPHIL NFR BLD AUTO: 1 % (ref 0–6)
ERYTHROCYTE [DISTWIDTH] IN BLOOD BY AUTOMATED COUNT: 12.3 % (ref 11.6–15.1)
HCT VFR BLD AUTO: 46.5 % (ref 34.8–46.1)
HGB BLD-MCNC: 16 G/DL (ref 11.5–15.4)
IMM GRANULOCYTES # BLD AUTO: 0.04 THOUSAND/UL (ref 0–0.2)
IMM GRANULOCYTES NFR BLD AUTO: 0 % (ref 0–2)
LYMPHOCYTES # BLD AUTO: 1.43 THOUSANDS/ÂΜL (ref 0.6–4.47)
LYMPHOCYTES NFR BLD AUTO: 15 % (ref 14–44)
MCH RBC QN AUTO: 31 PG (ref 26.8–34.3)
MCHC RBC AUTO-ENTMCNC: 34.4 G/DL (ref 31.4–37.4)
MCV RBC AUTO: 90 FL (ref 82–98)
MONOCYTES # BLD AUTO: 0.66 THOUSAND/ÂΜL (ref 0.17–1.22)
MONOCYTES NFR BLD AUTO: 7 % (ref 4–12)
NEUTROPHILS # BLD AUTO: 7.32 THOUSANDS/ÂΜL (ref 1.85–7.62)
NEUTS SEG NFR BLD AUTO: 76 % (ref 43–75)
NRBC BLD AUTO-RTO: 0 /100 WBCS
PLATELET # BLD AUTO: 210 THOUSANDS/UL (ref 149–390)
PMV BLD AUTO: 8.2 FL (ref 8.9–12.7)
RBC # BLD AUTO: 5.16 MILLION/UL (ref 3.81–5.12)
WBC # BLD AUTO: 9.59 THOUSAND/UL (ref 4.31–10.16)

## 2023-08-14 PROCEDURE — 36415 COLL VENOUS BLD VENIPUNCTURE: CPT

## 2023-08-14 PROCEDURE — 86900 BLOOD TYPING SEROLOGIC ABO: CPT | Performed by: OBSTETRICS & GYNECOLOGY

## 2023-08-14 PROCEDURE — 85025 COMPLETE CBC W/AUTO DIFF WBC: CPT

## 2023-08-14 PROCEDURE — 86901 BLOOD TYPING SEROLOGIC RH(D): CPT | Performed by: OBSTETRICS & GYNECOLOGY

## 2023-08-14 PROCEDURE — 86850 RBC ANTIBODY SCREEN: CPT | Performed by: OBSTETRICS & GYNECOLOGY

## 2023-08-14 PROCEDURE — 71046 X-RAY EXAM CHEST 2 VIEWS: CPT

## 2023-08-14 PROCEDURE — 93005 ELECTROCARDIOGRAM TRACING: CPT

## 2023-08-15 ENCOUNTER — LAB REQUISITION (OUTPATIENT)
Dept: LAB | Facility: HOSPITAL | Age: 53
End: 2023-08-15
Payer: COMMERCIAL

## 2023-08-15 DIAGNOSIS — Z01.818 ENCOUNTER FOR OTHER PREPROCEDURAL EXAMINATION: ICD-10-CM

## 2023-08-15 LAB
ABO GROUP BLD: NORMAL
BLD GP AB SCN SERPL QL: NEGATIVE
RH BLD: POSITIVE
SPECIMEN EXPIRATION DATE: NORMAL

## 2023-08-16 LAB
ATRIAL RATE: 79 BPM
P AXIS: 32 DEGREES
PR INTERVAL: 148 MS
QRS AXIS: 8 DEGREES
QRSD INTERVAL: 82 MS
QT INTERVAL: 390 MS
QTC INTERVAL: 447 MS
T WAVE AXIS: 31 DEGREES
VENTRICULAR RATE: 79 BPM

## 2023-08-16 PROCEDURE — 93010 ELECTROCARDIOGRAM REPORT: CPT | Performed by: INTERNAL MEDICINE

## 2023-08-18 NOTE — PRE-PROCEDURE INSTRUCTIONS
Pre-Surgery Instructions:   Medication Instructions   • albuterol (PROVENTIL HFA,VENTOLIN HFA) 90 mcg/act inhaler PRN   • ALPRAZolam (XANAX) 0.5 mg tablet Take as directed   • DULoxetine (CYMBALTA) 60 mg delayed release capsule Take as directed   • Empagliflozin 25 MG TABS Hold 4 days prior to surgery   • esomeprazole (NexIUM) 40 MG capsule Take as directed   • glimepiride (AMARYL) 1 mg tablet Hold day of surgery. • metFORMIN (GLUCOPHAGE) 500 mg tablet Hold day of surgery. • simvastatin (ZOCOR) 40 mg tablet Take night before surgery   • SYNTHROID 50 MCG tablet Take day of surgery. Medication instructions for day surgery reviewed. Please use only a sip of water to take your instructed medications. Avoid all over the counter vitamins, supplements and NSAIDS for one week prior to surgery per anesthesia guidelines. Tylenol is ok to take as needed. You will receive a call one business day prior to surgery with an arrival time and hospital directions. If your surgery is scheduled on a Monday, the hospital will be calling you on the Friday prior to your surgery. If you have not heard from anyone by 8pm, please call the hospital supervisor through the hospital  at [phone]. Candice Young [phone]). Do not eat or drink anything after midnight the night before your surgery, including candy, mints, lifesavers, or chewing gum. Do not drink alcohol 24hrs before your surgery. Try not to smoke at least 24hrs before your surgery. Follow the pre surgery showering instructions as listed in the [facility] Surgical Experience Booklet” or otherwise provided by your surgeon's office. Do not shave the surgical area 24 hours before surgery. Do not apply any lotions, creams, including makeup, cologne, deodorant, or perfumes after showering on the day of your surgery. No contact lenses, eye make-up, or artificial eyelashes.  Remove nail polish, including gel polish, and any artificial, gel, or acrylic nails if possible. Remove all jewelry including rings and body piercing jewelry. Wear causal clothing that is easy to take on and off. Consider your type of surgery. Keep any valuables, jewelry, piercings at home. Please bring any specially ordered equipment (sling, braces) if indicated. Arrange for a responsible person to drive you to and from the hospital on the day of your surgery. Visitor Guidelines discussed. Call the surgeon's office with any new illnesses, exposures, or additional questions prior to surgery. Please reference your [facility] Surgical Experience Booklet” for additional information to prepare for your upcoming surgery.

## 2023-08-21 ENCOUNTER — TELEPHONE (OUTPATIENT)
Age: 53
End: 2023-08-21

## 2023-08-21 NOTE — TELEPHONE ENCOUNTER
Patients GI provider:       Number to return call: [phone]    Reason for call: Pt calling because she noticed a letter from June in [facility] that stated she was due for a colonoscopy. Pt last Colonoscopy was 8/2020. She stated this was not a regular screening but for a problem so she is not sure if she is actually due but only wants to get scheduled if it is absolutely necessary. Do not see a recall date listed, only letter pt is referencing. Pt asking for confirmation of when she needs to have a colonoscopy and a call back informing of same. A message can be left.      Scheduled procedure/appointment date if applicable: N/A

## 2023-08-23 ENCOUNTER — ANESTHESIA EVENT (OUTPATIENT)
Dept: PERIOP | Facility: HOSPITAL | Age: 53
End: 2023-08-23
Payer: COMMERCIAL

## 2023-08-24 ENCOUNTER — HOSPITAL ENCOUNTER (OUTPATIENT)
Facility: HOSPITAL | Age: 53
Setting detail: OUTPATIENT SURGERY
Discharge: HOME | End: 2023-08-24
Attending: OBSTETRICS & GYNECOLOGY | Admitting: OBSTETRICS & GYNECOLOGY
Payer: COMMERCIAL

## 2023-08-24 ENCOUNTER — ANESTHESIA (OUTPATIENT)
Dept: PERIOP | Facility: HOSPITAL | Age: 53
End: 2023-08-24
Payer: COMMERCIAL

## 2023-08-24 VITALS
TEMPERATURE: 97.4 F | DIASTOLIC BLOOD PRESSURE: 77 MMHG | SYSTOLIC BLOOD PRESSURE: 136 MMHG | WEIGHT: 160 LBS | RESPIRATION RATE: 16 BRPM | HEART RATE: 75 BPM | HEIGHT: 62 IN | BODY MASS INDEX: 29.44 KG/M2 | OXYGEN SATURATION: 97 %

## 2023-08-24 DIAGNOSIS — G89.18 POSTOPERATIVE PAIN: ICD-10-CM

## 2023-08-24 DIAGNOSIS — L72.0 EPIDERMOID CYST OF SKIN OF INGUINAL REGION: ICD-10-CM

## 2023-08-24 DIAGNOSIS — G89.18 POST-OP PAIN: Primary | ICD-10-CM

## 2023-08-24 LAB
GLUCOSE SERPL-MCNC: 144 MG/DL (ref 65–140)
GLUCOSE SERPL-MCNC: 98 MG/DL (ref 65–140)

## 2023-08-24 PROCEDURE — 56620 VULVECTOMY SIMPLE PARTIAL: CPT | Performed by: OBSTETRICS & GYNECOLOGY

## 2023-08-24 PROCEDURE — NC001 PR NO CHARGE: Performed by: OBSTETRICS & GYNECOLOGY

## 2023-08-24 PROCEDURE — 88304 TISSUE EXAM BY PATHOLOGIST: CPT | Performed by: PATHOLOGY

## 2023-08-24 PROCEDURE — 82948 REAGENT STRIP/BLOOD GLUCOSE: CPT

## 2023-08-24 RX ORDER — PROPOFOL 10 MG/ML
INJECTION, EMULSION INTRAVENOUS AS NEEDED
Status: DISCONTINUED | OUTPATIENT
Start: 2023-08-24 | End: 2023-08-24

## 2023-08-24 RX ORDER — SODIUM CHLORIDE, SODIUM LACTATE, POTASSIUM CHLORIDE, CALCIUM CHLORIDE 600; 310; 30; 20 MG/100ML; MG/100ML; MG/100ML; MG/100ML
125 INJECTION, SOLUTION INTRAVENOUS CONTINUOUS
Status: DISCONTINUED | OUTPATIENT
Start: 2023-08-24 | End: 2023-08-24 | Stop reason: HOSPADM

## 2023-08-24 RX ORDER — CEFAZOLIN SODIUM 1 G/50ML
1000 SOLUTION INTRAVENOUS ONCE
Status: COMPLETED | OUTPATIENT
Start: 2023-08-24 | End: 2023-08-24

## 2023-08-24 RX ORDER — ONDANSETRON 2 MG/ML
INJECTION INTRAMUSCULAR; INTRAVENOUS AS NEEDED
Status: DISCONTINUED | OUTPATIENT
Start: 2023-08-24 | End: 2023-08-24

## 2023-08-24 RX ORDER — METOCLOPRAMIDE HYDROCHLORIDE 5 MG/ML
INJECTION INTRAMUSCULAR; INTRAVENOUS AS NEEDED
Status: DISCONTINUED | OUTPATIENT
Start: 2023-08-24 | End: 2023-08-24

## 2023-08-24 RX ORDER — LIDOCAINE HYDROCHLORIDE 10 MG/ML
INJECTION, SOLUTION EPIDURAL; INFILTRATION; INTRACAUDAL; PERINEURAL AS NEEDED
Status: DISCONTINUED | OUTPATIENT
Start: 2023-08-24 | End: 2023-08-24

## 2023-08-24 RX ORDER — ACETAMINOPHEN 325 MG/1
650 TABLET ORAL EVERY 6 HOURS PRN
Qty: 60 TABLET | Refills: 0 | Status: SHIPPED | OUTPATIENT
Start: 2023-08-24

## 2023-08-24 RX ORDER — LABETALOL HYDROCHLORIDE 5 MG/ML
10 INJECTION, SOLUTION INTRAVENOUS
Status: DISCONTINUED | OUTPATIENT
Start: 2023-08-24 | End: 2023-08-24 | Stop reason: HOSPADM

## 2023-08-24 RX ORDER — MIDAZOLAM HYDROCHLORIDE 2 MG/2ML
INJECTION, SOLUTION INTRAMUSCULAR; INTRAVENOUS AS NEEDED
Status: DISCONTINUED | OUTPATIENT
Start: 2023-08-24 | End: 2023-08-24

## 2023-08-24 RX ORDER — ALBUTEROL SULFATE 2.5 MG/3ML
2.5 SOLUTION RESPIRATORY (INHALATION) ONCE AS NEEDED
Status: DISCONTINUED | OUTPATIENT
Start: 2023-08-24 | End: 2023-08-24 | Stop reason: HOSPADM

## 2023-08-24 RX ORDER — IBUPROFEN 600 MG/1
600 TABLET ORAL EVERY 6 HOURS PRN
Qty: 60 TABLET | Refills: 0 | Status: SHIPPED | OUTPATIENT
Start: 2023-08-24

## 2023-08-24 RX ORDER — ONDANSETRON 2 MG/ML
4 INJECTION INTRAMUSCULAR; INTRAVENOUS ONCE AS NEEDED
Status: DISCONTINUED | OUTPATIENT
Start: 2023-08-24 | End: 2023-08-24 | Stop reason: HOSPADM

## 2023-08-24 RX ORDER — OXYCODONE HYDROCHLORIDE 5 MG/1
5 TABLET ORAL EVERY 4 HOURS PRN
Status: CANCELLED | OUTPATIENT
Start: 2023-08-24

## 2023-08-24 RX ORDER — HYDROMORPHONE HCL IN WATER/PF 6 MG/30 ML
0.2 PATIENT CONTROLLED ANALGESIA SYRINGE INTRAVENOUS
Status: DISCONTINUED | OUTPATIENT
Start: 2023-08-24 | End: 2023-08-24 | Stop reason: HOSPADM

## 2023-08-24 RX ORDER — BUPIVACAINE HYDROCHLORIDE 5 MG/ML
INJECTION, SOLUTION EPIDURAL; INTRACAUDAL AS NEEDED
Status: DISCONTINUED | OUTPATIENT
Start: 2023-08-24 | End: 2023-08-24 | Stop reason: HOSPADM

## 2023-08-24 RX ORDER — DEXAMETHASONE SODIUM PHOSPHATE 10 MG/ML
INJECTION, SOLUTION INTRAMUSCULAR; INTRAVENOUS AS NEEDED
Status: DISCONTINUED | OUTPATIENT
Start: 2023-08-24 | End: 2023-08-24

## 2023-08-24 RX ORDER — HYDROMORPHONE HCL/PF 1 MG/ML
0.5 SYRINGE (ML) INJECTION
Status: DISCONTINUED | OUTPATIENT
Start: 2023-08-24 | End: 2023-08-24 | Stop reason: HOSPADM

## 2023-08-24 RX ORDER — FENTANYL CITRATE 50 UG/ML
INJECTION, SOLUTION INTRAMUSCULAR; INTRAVENOUS AS NEEDED
Status: DISCONTINUED | OUTPATIENT
Start: 2023-08-24 | End: 2023-08-24

## 2023-08-24 RX ORDER — MAGNESIUM HYDROXIDE 1200 MG/15ML
LIQUID ORAL AS NEEDED
Status: DISCONTINUED | OUTPATIENT
Start: 2023-08-24 | End: 2023-08-24 | Stop reason: HOSPADM

## 2023-08-24 RX ORDER — ACETAMINOPHEN 325 MG/1
975 TABLET ORAL EVERY 6 HOURS PRN
Status: CANCELLED | OUTPATIENT
Start: 2023-08-24

## 2023-08-24 RX ORDER — METOCLOPRAMIDE HYDROCHLORIDE 5 MG/ML
10 INJECTION INTRAMUSCULAR; INTRAVENOUS ONCE AS NEEDED
Status: DISCONTINUED | OUTPATIENT
Start: 2023-08-24 | End: 2023-08-24 | Stop reason: HOSPADM

## 2023-08-24 RX ORDER — MIDAZOLAM HYDROCHLORIDE 2 MG/2ML
2 INJECTION, SOLUTION INTRAMUSCULAR; INTRAVENOUS ONCE
Status: COMPLETED | OUTPATIENT
Start: 2023-08-24 | End: 2023-08-24

## 2023-08-24 RX ORDER — PROMETHAZINE HYDROCHLORIDE 25 MG/ML
12.5 INJECTION, SOLUTION INTRAMUSCULAR; INTRAVENOUS ONCE AS NEEDED
Status: DISCONTINUED | OUTPATIENT
Start: 2023-08-24 | End: 2023-08-24 | Stop reason: HOSPADM

## 2023-08-24 RX ORDER — KETOROLAC TROMETHAMINE 30 MG/ML
INJECTION, SOLUTION INTRAMUSCULAR; INTRAVENOUS AS NEEDED
Status: DISCONTINUED | OUTPATIENT
Start: 2023-08-24 | End: 2023-08-24

## 2023-08-24 RX ORDER — ONDANSETRON 2 MG/ML
4 INJECTION INTRAMUSCULAR; INTRAVENOUS EVERY 6 HOURS PRN
Status: DISCONTINUED | OUTPATIENT
Start: 2023-08-24 | End: 2023-08-24 | Stop reason: HOSPADM

## 2023-08-24 RX ORDER — SODIUM CHLORIDE, SODIUM LACTATE, POTASSIUM CHLORIDE, CALCIUM CHLORIDE 600; 310; 30; 20 MG/100ML; MG/100ML; MG/100ML; MG/100ML
100 INJECTION, SOLUTION INTRAVENOUS CONTINUOUS
Status: DISCONTINUED | OUTPATIENT
Start: 2023-08-24 | End: 2023-08-24 | Stop reason: HOSPADM

## 2023-08-24 RX ORDER — HYDRALAZINE HYDROCHLORIDE 20 MG/ML
5 INJECTION INTRAMUSCULAR; INTRAVENOUS
Status: DISCONTINUED | OUTPATIENT
Start: 2023-08-24 | End: 2023-08-24 | Stop reason: HOSPADM

## 2023-08-24 RX ORDER — OXYCODONE HYDROCHLORIDE 5 MG/1
TABLET ORAL
Qty: 10 TABLET | Refills: 0 | Status: SHIPPED | OUTPATIENT
Start: 2023-08-24

## 2023-08-24 RX ORDER — FENTANYL CITRATE/PF 50 MCG/ML
25 SYRINGE (ML) INJECTION
Status: DISCONTINUED | OUTPATIENT
Start: 2023-08-24 | End: 2023-08-24 | Stop reason: HOSPADM

## 2023-08-24 RX ORDER — SODIUM CHLORIDE, SODIUM LACTATE, POTASSIUM CHLORIDE, CALCIUM CHLORIDE 600; 310; 30; 20 MG/100ML; MG/100ML; MG/100ML; MG/100ML
INJECTION, SOLUTION INTRAVENOUS CONTINUOUS PRN
Status: DISCONTINUED | OUTPATIENT
Start: 2023-08-24 | End: 2023-08-24

## 2023-08-24 RX ADMIN — DEXAMETHASONE SODIUM PHOSPHATE 10 MG: 10 INJECTION, SOLUTION INTRAMUSCULAR; INTRAVENOUS at 15:58

## 2023-08-24 RX ADMIN — MIDAZOLAM HYDROCHLORIDE 2 MG: 1 INJECTION, SOLUTION INTRAMUSCULAR; INTRAVENOUS at 15:53

## 2023-08-24 RX ADMIN — MIDAZOLAM 2 MG: 1 INJECTION INTRAMUSCULAR; INTRAVENOUS at 14:15

## 2023-08-24 RX ADMIN — ONDANSETRON 4 MG: 2 INJECTION INTRAMUSCULAR; INTRAVENOUS at 15:58

## 2023-08-24 RX ADMIN — CEFAZOLIN SODIUM 1000 MG: 1 SOLUTION INTRAVENOUS at 15:58

## 2023-08-24 RX ADMIN — PROPOFOL 200 MG: 10 INJECTION, EMULSION INTRAVENOUS at 15:57

## 2023-08-24 RX ADMIN — SODIUM CHLORIDE, SODIUM LACTATE, POTASSIUM CHLORIDE, AND CALCIUM CHLORIDE: .6; .31; .03; .02 INJECTION, SOLUTION INTRAVENOUS at 15:37

## 2023-08-24 RX ADMIN — KETOROLAC TROMETHAMINE 15 MG: 30 INJECTION, SOLUTION INTRAMUSCULAR at 16:19

## 2023-08-24 RX ADMIN — FENTANYL CITRATE 100 MCG: 50 INJECTION, SOLUTION INTRAMUSCULAR; INTRAVENOUS at 15:53

## 2023-08-24 RX ADMIN — LIDOCAINE HYDROCHLORIDE 50 MG: 10 INJECTION, SOLUTION EPIDURAL; INFILTRATION; INTRACAUDAL; PERINEURAL at 15:57

## 2023-08-24 RX ADMIN — METOCLOPRAMIDE 10 MG: 5 INJECTION, SOLUTION INTRAMUSCULAR; INTRAVENOUS at 15:53

## 2023-08-24 NOTE — ANESTHESIA POSTPROCEDURE EVALUATION
Post-Op Assessment Note    CV Status:  Stable  Pain Score: 0    Pain management: adequate     Mental Status:  Alert and awake   Hydration Status:  Stable   PONV Controlled:  None   Airway Patency:  Patent      Post Op Vitals Reviewed: Yes      Staff: CRNA   Comments: AAOx3, SV Nonobstructed, VSS        No notable events documented.     BP   153/67   Temp  97.7   Pulse  86   Resp   18   SpO2   97

## 2023-08-24 NOTE — H&P
Assessment/Plan:         Problem List Items Addressed This Visit               Musculoskeletal and Integument     Epidermoid cyst of skin of inguinal region       Patient is a very pleasant 49-year-old female with a history of recurrent cystic structure of the left mons pubis. This has been ongoing for approximately 17 years. This is required the patient to drain it herself with her insulin needles intermittently and most recently almost every day for the past 6 months. The lesion itself is now improving. 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  The patient has no other recurrent cysts of the it is unlikely that she has hidradenitis suppurativa.     If this is 1 single area of recurrence it is reasonable to resect it. We have discussed drainage but the patient is already doing that. She has used antibiotics in the past with minimal help.     We recommended simple partial vulvectomy we discussed risks and benefits including bleeding requiring transfusion infection and damage to local structures as well as recurrence of cystic structure once removal is complete and cosmetic change.     The patient understands accepts the risk of surgery and has signed an informed consent. Risk of infection is higher in this lesion due to the patient's ongoing diabetes and smoking.     Preoperative test including chest x-ray EKG routine blood work will be ordered. We recommended chlorhexidine wipes prior to surgery.     We will move ahead with surgery in the next few weeks.           Relevant Orders     Case request operating room: VULVECTOMY SIMPLE partial (Completed)     Type and screen     CBC and differential     Basic metabolic panel     EKG 12 lead     XR chest pa & lateral     HEMOGLOBIN A1C W/ EAG ESTIMATION         Have recommended holding metformin and Empagliflozen the day of surgery.     CHIEF COMPLAINT: Vulvar lesion              Patient ID: Lance Stevenson is a 45 y. o. female  Patient is a very pleasant 57-year-old female who presents today for preoperative consultation for removal of a cyst of the mons pubis.     She has had this cyst for 16 years. She is interested only in removal of the entire cyst and cyst wall. She drains it regularly as it grows continuously and becomes very painful if not drained. Cyst is best visualized in supine position. The patient has been draining the cyst every day for approximately 6 months. Prior to that she notes that intermittently. Over the past week or so it has started to clear.     The patient reports that pain can be enough that it hurts to wear underwear and she would like the cyst removed. She has no other complaint.     Today, the patient is doing well. She denies significant abdominal pain, pelvic pain, nausea, vomiting, constipation, diarrhea, fevers, chills, or vaginal bleeding. Denies any further infections in other areas of the vulva or armpit regions        Review of Systems   Constitutional: Negative. HENT: Negative. Eyes: Negative. Respiratory: Negative. Cardiovascular: Negative. Gastrointestinal: Negative. Endocrine: Negative. Genitourinary: Negative. Musculoskeletal: Negative. Skin: Negative. Neurological: Negative. Hematological: Negative.     Psychiatric/Behavioral: Negative.          Current Medications          Current Outpatient Medications   Medication Sig Dispense Refill   • albuterol (PROVENTIL HFA,VENTOLIN HFA) 90 mcg/act inhaler Inhale 2 puffs every 6 (six) hours as needed for wheezing or shortness of breath 1 Inhaler 5   • ALPRAZolam (XANAX) 0.5 mg tablet Take 0.5 mg by mouth 3 (three) times a day as needed       • DULoxetine (CYMBALTA) 60 mg delayed release capsule Take 60 mg by mouth daily         • Empagliflozin 25 MG TABS Take 25 mg by mouth every morning       • esomeprazole (NexIUM) 40 MG capsule TAKE 1 CAPSULE TWICE A  capsule 3   • fluticasone (FLONASE) 50 mcg/act nasal spray 1 spray into each nostril daily 48 g 3   • glimepiride (AMARYL) 1 mg tablet Take 1 mg by mouth 2 (two) times a day        • metFORMIN (GLUCOPHAGE) 500 mg tablet Take 500 mg by mouth 2 (two) times a day       • simvastatin (ZOCOR) 40 mg tablet Take 40 mg by mouth daily         • SYNTHROID 50 MCG tablet Take 50 mcg by mouth daily         • meloxicam (MOBIC) 15 mg tablet  (Patient not taking: Reported on 2023)       • valACYclovir (VALTREX) 1,000 mg tablet Take 1 tablet (1,000 mg total) by mouth 2 (two) times a day for 7 days 14 tablet 0      No current facility-administered medications for this visit.            No Known Allergies     Medical History        Past Medical History:   Diagnosis Date   • Adrenal adenoma 2019   • Bipolar 1 disorder ([address])     • Colitis     • Diabetes mellitus ([address])     • Diabetic gastroparesis ([address]) 2016   • Fibroid    • GERD (gastroesophageal reflux disease)    • Gestational diabetes    • HLD (hyperlipidemia)     • Skin cancer    • Varicella 1970's            Surgical History         Past Surgical History:   Procedure Laterality Date   • APPENDECTOMY       •  SECTION       • CHOLECYSTECTOMY       • HERNIA REPAIR       • HYSTEROSCOPY W/ ENDOMETRIAL ABLATION   2015     Last assessed   • UTERINE FIBROID SURGERY       • WISDOM TOOTH EXTRACTION                         OB History         3    Para   3    Term   2       1    AB   0    Living   2        SAB   0    IAB   0    Ectopic        Multiple   0    Live Births   2                     Family History         Family History   Problem Relation Age of Onset   • Lung cancer Mother     • Cancer Mother           small cell lung   • Melanoma Mother     • Heart attack Father     • Heart failure Father     • Heart disease Father     • Colon cancer Maternal Uncle     • Heart attack Maternal Uncle     • Diabetes Maternal Grandmother     • Heart attack Maternal Grandmother     • Lung cancer Paternal Grandmother     • Heart disease Paternal Grandfather              The following portions of the patient's history were reviewed and updated as appropriate: allergies, current medications, past family history, past social history, past surgical history and problem list.        Objective:     Blood pressure 128/86, pulse 83, temperature 97.9 °F (36.6 °C), temperature source Temporal, resp. rate 18, height 5' 2" (1.575 m), weight 72.6 kg (160 lb), SpO2 96 %, not currently breastfeeding. Body mass index is 29.26 kg/m².     Physical Exam  Constitutional:       Appearance: She is well-developed. HENT:      Head: Normocephalic and atraumatic. Eyes:      Pupils: Pupils are equal, round, and reactive to light. Cardiovascular:      Rate and Rhythm: Normal rate and regular rhythm. Heart sounds: Normal heart sounds. Pulmonary:      Effort: Pulmonary effort is normal. No respiratory distress. Breath sounds: Normal breath sounds. Abdominal:      General: Bowel sounds are normal. There is no distension. Palpations: Abdomen is soft. Abdomen is not rigid. Tenderness: There is no abdominal tenderness. There is no guarding or rebound. Genitourinary:         Comments: -Normal external female genitalia with the exception of a 2 x 2 centimeter discolored minimally palpable lesion in the left mons, normal Bartholin's and [place]'s glands                  -Normal midline urethral meatus. No lesions notes                  -Bladder without fullness mass or tenderness                  -Vagina without lesion or discharge No significant cystocele or rectocele noted                  -Cervix normal appearing without visible lesions                  -Uterus with normal contour, mobility. No tenderness,                  -Adnexae without  mass or tenderness                  - Anus without fissure of lesion    Musculoskeletal:         General: Normal range of motion.       Cervical back: Normal range of motion and neck supple. Lymphadenopathy:      Cervical: No cervical adenopathy. Upper Body:      Right upper body: No supraclavicular adenopathy. Left upper body: No supraclavicular adenopathy. Skin:     General: Skin is warm and dry. Neurological:      Mental Status: She is alert and oriented to person, place, and time.    Psychiatric:         Behavior: Behavior normal.

## 2023-08-24 NOTE — ANESTHESIA PREPROCEDURE EVALUATION
Procedure:  VULVECTOMY SIMPLE PARTIAL (Vulva)    Relevant Problems   ANESTHESIA (within normal limits)      CARDIO   (+) Mixed hyperlipidemia due to type 2 diabetes mellitus (HCC)      ENDO   (+) Hypothyroidism (acquired)   (+) Type 2 diabetes mellitus without complication, without long-term current use of insulin (HCC)      GI/HEPATIC   (+) Chronic gastroesophageal reflux disease      Endocrine   (+) Uncontrolled type 2 diabetes mellitus with hyperglycemia (HCC)      Musculoskeletal and Integument   (+) Epidermoid cyst of skin of inguinal region      Other   (+) Bipolar disorder (HCC)        Physical Exam    Airway    Mallampati score: II  TM Distance: >3 FB  Neck ROM: full     Dental   No notable dental hx     Cardiovascular  Rhythm: regular, Rate: normal, Cardiovascular exam normal    Pulmonary  Pulmonary exam normal Breath sounds clear to auscultation,     Other Findings        Anesthesia Plan  ASA Score- 3     Anesthesia Type- general with ASA Monitors. Additional Monitors:   Airway Plan: LMA. Plan Factors-    Chart reviewed. Existing labs reviewed. Patient summary reviewed. Patient is a current smoker. Patient instructed to abstain from smoking on day of procedure. Patient smoked on day of surgery. Induction- intravenous. Postoperative Plan- Plan for postoperative opioid use. Planned trial extubation    Informed Consent- Anesthetic plan and risks discussed with patient. I personally reviewed this patient with the CRNA. Discussed and agreed on the Anesthesia Plan with the CRNA. Diogo Nichole

## 2023-08-24 NOTE — OP NOTE
OPERATIVE REPORT  PATIENT NAME: Michael Red    :  1970  MRN: [id number]  Pt Location: AN OR ROOM 04    SURGERY DATE: 2023    Surgeon(s) and Role:     * Gerhard Rene MD - Primary     * Rena Nelson MD - Assisting    Preop Diagnosis:  Epidermoid cyst of skin of inguinal region [L72.0]    Post-Op Diagnosis Codes:     * Epidermoid cyst of skin of inguinal region [L72.0]    Procedure(s):  VULVECTOMY SIMPLE PARTIAL    Specimen(s):  ID Type Source Tests Collected by Time Destination   1 : left vulva Tissue Vulva TISSUE EXAM Gerhard Rene MD 2023 1613        Estimated Blood Loss:   Minimal    Drains:  * No LDAs found *    Anesthesia Type:   Choice    Operative Indications:  Epidermoid cyst of skin of inguinal region [L72.0]      Operative Findings:  1 x 1 cm left mons thickening resected    Complications:   None    Procedure and Technique:    After establishment of adequate general laryngeal mask anesthesia the patient was placed in dorsolithotomy position and prepped and draped in usual sterile fashion. A timeout procedure was performed. The area to be excised was identified by palpation. An elliptical michelle was created around this and skin incision was created with a knife. Bovie electrocautery device was used to take the tissue deep into the subcutaneous spot removing the entirety of the thickened area. The area was then irrigated with copious quantities of normal saline solution and injected with 0.5% Marcaine. The subcutaneous tissue was reapproximated with 3-0 Monocryl and the skin was approximated with 4-0 Monocryl. Patient tolerated procedure without complication sponge instrument counts were correct prior to closure and hemostasis was assured prior to closure   I was present for the entire procedure.     Patient Disposition:  PACU         SIGNATURE: Gerhard Rene MD  DATE: 2023  TIME: 4:20 PM

## 2023-08-24 NOTE — INTERVAL H&P NOTE
H&P reviewed. After examining the patient I find no changes in the patients condition since the H&P had been written. Plan simple partial vulvectomy for recurrent infection of the vulva. Preoperative testing is stable for surgery today.     Vitals:    08/24/23 1347   BP: 116/87   Pulse: 80   Resp: 20   Temp: (!) 96.8 °F (36 °C)   SpO2: 98%

## 2023-08-24 NOTE — DISCHARGE INSTR - AVS FIRST PAGE
[facility]  Carolyn Harrington and Yamil  [phone]     Vulvectomy Discharge Instructions     DISCHARGE INSTRUCTIONS:   Contact your doctor at the number above if:   You have a fever over 101o. You have nausea or are vomiting that does not improve after a light meal.   Your pain is getting worse, even after you take medicine. You feel pain or burning when you urinate, or you have trouble urinating. You have pus or a foul-smelling odor coming from your vagina. Your wound is red, swollen, or draining pus. You see new or an increased amount of bright red blood coming from your vagina or your incisions. You have questions or concerns about your condition or care. Seek care immediately:   Your arm or leg feels warm, tender, and painful. It may look swollen and red. You have increasing abdominal or pelvic pain. You have heavy vaginal bleeding that fills 1 or more sanitary pads in 1 hour. Call 911 for any of the following: You feel lightheaded, short of breath, and have chest pain. You cough up blood. Medicines: You may need any of the following:  Prescription medicine may be given. You may receive a prescription for pain medication or be advised to use over the counter (OTC) pain medication such as acetaminophen (Tylenol) or ibuprofen (Advil, Motrin). Ask your healthcare provider how to take this medicine safely. NSAIDs , such as ibuprofen, help decrease swelling, pain, and fever. NSAIDs can cause stomach bleeding or kidney problems in certain people. If you take blood thinner medicine, always ask your healthcare provider if NSAIDs are safe for you. Always read the medicine label and follow directions. Stool softeners help treat or prevent constipation. Take your medicine as directed. Contact your healthcare provider if you think your medicine is not helping or if you have side effects.  Tell him or her if you are allergic to any medicine. Keep a list of the medicines, vitamins, and herbs you take. Include the amounts, and when and why you take them. Bring the list or the pill bottles to follow-up visits. Carry your medicine list with you in case of an emergency. Activity:   Rest as needed. Get up and move around as directed to help prevent blood clots. Start with short walks and slowly increase the distance every day. Limit the number of times you climb stairs to 2 times each day for the first week. Plan most of your daily activities on one level of your home. Do not have sex, use tampons, or douche     Do not go into pools or hot tubs until cleared by your doctor. Ask when you may return to work and to other regular activities. Follow up with your healthcare provider or gynecologist as directed: You may need to return to have stitches removed, and for other tests. Write down your questions so you remember to ask them during your visits. © 2017 [address] [place] [address] [place] Information is for End User's use only and may not be sold, redistributed or otherwise used for commercial purposes. All illustrations and images included in CareNotes® are the copyrighted property of [employer] or [employer]. The above information is an  only. It is not intended as medical advice for individual conditions or treatments. Talk to your doctor, nurse or pharmacist before following any medical regimen to see if it is safe and effective for you.

## 2023-08-28 LAB
LEFT EYE DIABETIC RETINOPATHY: NORMAL
RIGHT EYE DIABETIC RETINOPATHY: NORMAL

## 2023-08-29 PROCEDURE — 88304 TISSUE EXAM BY PATHOLOGIST: CPT | Performed by: PATHOLOGY

## 2023-09-20 ENCOUNTER — OFFICE VISIT (OUTPATIENT)
Dept: GYNECOLOGIC ONCOLOGY | Facility: CLINIC | Age: 53
End: 2023-09-20

## 2023-09-20 VITALS
HEIGHT: 62 IN | WEIGHT: 157.4 LBS | OXYGEN SATURATION: 95 % | BODY MASS INDEX: 28.97 KG/M2 | SYSTOLIC BLOOD PRESSURE: 126 MMHG | DIASTOLIC BLOOD PRESSURE: 84 MMHG | HEART RATE: 80 BPM

## 2023-09-20 DIAGNOSIS — N90.7 VULVAR CYST: Primary | ICD-10-CM

## 2023-09-20 PROCEDURE — 99024 POSTOP FOLLOW-UP VISIT: CPT | Performed by: OBSTETRICS & GYNECOLOGY

## 2023-09-20 NOTE — PROGRESS NOTES
Assessment/Plan:    Problem List Items Addressed This Visit        Genitourinary    Vulvar cyst - Primary     Patient is status post resection of drainage area in the vulva. Biopsy is benign. Healing is complete. Patient will follow up on as an as-needed basis              CHIEF COMPLAINT: Follow-up post vulvectomy      Patient ID: Usama Rodriguez is a 52 y.o. female  Patient is a very pleasant 47-year-old female with a history of chronic draining vulvar abscess. She underwent resection several weeks ago and presents today for follow-up. Surgery was initially uncomplicated. She reports today in follow-up. Final pathology report reveals the following:  Final Diagnosis  A. Skin, Cyst, left vulva, excision:  - Epidermal (infundibular) cyst with associated chronic inflammation, siderosis and scar.  - Negative for malignancy. Today, the patient is doing well. She denies significant abdominal pain, pelvic pain, nausea, vomiting, constipation, diarrhea, fevers, chills, or vaginal bleeding. The following portions of the patient's history were reviewed and updated as appropriate: allergies, current medications, past family history, past social history, past surgical history and problem list.    Review of Systems   Constitutional: Negative. HENT: Negative. Eyes: Negative. Respiratory: Negative. Cardiovascular: Negative. Gastrointestinal: Negative. Endocrine: Negative. Genitourinary: Negative. Musculoskeletal: Negative. Skin: Negative. Neurological: Negative. Hematological: Negative. Psychiatric/Behavioral: Negative.         Current Outpatient Medications   Medication Sig Dispense Refill   • acetaminophen (TYLENOL) 325 mg tablet Take 2 tablets (650 mg total) by mouth every 6 (six) hours as needed for mild pain 60 tablet 0   • albuterol (PROVENTIL HFA,VENTOLIN HFA) 90 mcg/act inhaler Inhale 2 puffs every 6 (six) hours as needed for wheezing or shortness of breath 1 Inhaler 5   • ALPRAZolam (XANAX) 0.5 mg tablet Take 0.5 mg by mouth 3 (three) times a day as needed     • DULoxetine (CYMBALTA) 60 mg delayed release capsule Take 60 mg by mouth daily       • Empagliflozin 25 MG TABS Take 25 mg by mouth every morning     • esomeprazole (NexIUM) 40 MG capsule TAKE 1 CAPSULE TWICE A  capsule 3   • fluticasone (FLONASE) 50 mcg/act nasal spray 1 spray into each nostril daily 48 g 3   • glimepiride (AMARYL) 1 mg tablet Take 1 mg by mouth 2 (two) times a day      • ibuprofen (MOTRIN) 600 mg tablet Take 1 tablet (600 mg total) by mouth every 6 (six) hours as needed for mild pain 60 tablet 0   • metFORMIN (GLUCOPHAGE) 500 mg tablet Take 500 mg by mouth 2 (two) times a day     • simvastatin (ZOCOR) 40 mg tablet Take 40 mg by mouth daily       • SYNTHROID 50 MCG tablet Take 50 mcg by mouth daily       • meloxicam (MOBIC) 15 mg tablet  (Patient not taking: Reported on 5/23/2023)     • oxyCODONE (ROXICODONE) 5 immediate release tablet 1 tablet by mouth every 4-6 hour as needed (Patient not taking: Reported on 9/20/2023) 10 tablet 0     No current facility-administered medications for this visit. Objective:    Blood pressure 126/84, pulse 80, height 5' 2" (1.575 m), weight 71.4 kg (157 lb 6.4 oz), SpO2 95 %, not currently breastfeeding. Body mass index is 28.79 kg/m². Body surface area is 1.73 meters squared. Physical Exam  Constitutional:       Appearance: She is well-developed. HENT:      Head: Normocephalic and atraumatic. Eyes:      Pupils: Pupils are equal, round, and reactive to light. Cardiovascular:      Rate and Rhythm: Normal rate and regular rhythm. Heart sounds: Normal heart sounds. Pulmonary:      Effort: Pulmonary effort is normal. No respiratory distress. Breath sounds: Normal breath sounds. Abdominal:      General: Bowel sounds are normal. There is no distension. Palpations: Abdomen is soft. Abdomen is not rigid. Tenderness:  There is no abdominal tenderness. There is no guarding or rebound. Genitourinary:     Comments: Mons with well-healed incision no fluctuance no erythema  Musculoskeletal:         General: Normal range of motion. Cervical back: Normal range of motion and neck supple. Lymphadenopathy:      Cervical: No cervical adenopathy. Upper Body:      Right upper body: No supraclavicular adenopathy. Left upper body: No supraclavicular adenopathy. Skin:     General: Skin is warm and dry. Neurological:      Mental Status: She is alert and oriented to person, place, and time.    Psychiatric:         Behavior: Behavior normal.         Lab Results   Component Value Date    WBC 9.59 08/14/2023    HGB 16.0 (H) 08/14/2023    HCT 46.5 (H) 08/14/2023    MCV 90 08/14/2023     08/14/2023     Lab Results   Component Value Date    NEUTROABS 7.32 08/14/2023        Trend:  No results found for: ""

## 2023-09-20 NOTE — ASSESSMENT & PLAN NOTE
Patient is status post resection of drainage area in the vulva. Biopsy is benign. Healing is complete.   Patient will follow up on as an as-needed basis

## 2023-09-20 NOTE — LETTER
September 20, 2023     Jonesdiana Maldonado   [address] [facility]    Patient: Randal Morales   YOB: 1970   Date of Visit: 9/20/2023       Dear Dr. Davidson Sep:    Thank you for referring Randal Morales to me for evaluation. Below are my notes for this consultation. If you have questions, please do not hesitate to call me. I look forward to following your patient along with you. Sincerely,        Spike Azevedo MD        CC: MD Spike Enriquez MD  9/20/2023  2:13 PM  Incomplete  Assessment/Plan:    Problem List Items Addressed This Visit          Genitourinary    Vulvar cyst - Primary     Patient is status post resection of drainage area in the vulva. Biopsy is benign. Healing is complete. Patient will follow up on as an as-needed basis              CHIEF COMPLAINT: Follow-up post vulvectomy      Problem:  Cancer Staging   No matching staging information was found for the patient. Previous therapy:  Oncology History    No history exists. Patient ID: Randal Morales is a 52 y.o. female  Patient is a very pleasant 47-year-old female with a history of chronic draining vulvar abscess. She underwent resection several weeks ago and presents today for follow-up. Surgery was initially uncomplicated. She reports today in follow-up. Final pathology report reveals the following:  Final Diagnosis  A. Skin, Cyst, left vulva, excision:  - Epidermal (infundibular) cyst with associated chronic inflammation, siderosis and scar.  - Negative for malignancy. Today, the patient is doing well. She denies significant abdominal pain, pelvic pain, nausea, vomiting, constipation, diarrhea, fevers, chills, or vaginal bleeding.       The following portions of the patient's history were reviewed and updated as appropriate: allergies, current medications, past family history, past social history, past surgical history and problem list.    Review of Systems   Constitutional: Negative. HENT: Negative. Eyes: Negative. Respiratory: Negative. Cardiovascular: Negative. Gastrointestinal: Negative. Endocrine: Negative. Genitourinary: Negative. Musculoskeletal: Negative. Skin: Negative. Neurological: Negative. Hematological: Negative. Psychiatric/Behavioral: Negative. Current Outpatient Medications   Medication Sig Dispense Refill   • acetaminophen (TYLENOL) 325 mg tablet Take 2 tablets (650 mg total) by mouth every 6 (six) hours as needed for mild pain 60 tablet 0   • albuterol (PROVENTIL HFA,VENTOLIN HFA) 90 mcg/act inhaler Inhale 2 puffs every 6 (six) hours as needed for wheezing or shortness of breath 1 Inhaler 5   • ALPRAZolam (XANAX) 0.5 mg tablet Take 0.5 mg by mouth 3 (three) times a day as needed     • DULoxetine (CYMBALTA) 60 mg delayed release capsule Take 60 mg by mouth daily       • Empagliflozin 25 MG TABS Take 25 mg by mouth every morning     • esomeprazole (NexIUM) 40 MG capsule TAKE 1 CAPSULE TWICE A  capsule 3   • fluticasone (FLONASE) 50 mcg/act nasal spray 1 spray into each nostril daily 48 g 3   • glimepiride (AMARYL) 1 mg tablet Take 1 mg by mouth 2 (two) times a day      • ibuprofen (MOTRIN) 600 mg tablet Take 1 tablet (600 mg total) by mouth every 6 (six) hours as needed for mild pain 60 tablet 0   • metFORMIN (GLUCOPHAGE) 500 mg tablet Take 500 mg by mouth 2 (two) times a day     • simvastatin (ZOCOR) 40 mg tablet Take 40 mg by mouth daily       • SYNTHROID 50 MCG tablet Take 50 mcg by mouth daily       • meloxicam (MOBIC) 15 mg tablet  (Patient not taking: Reported on 5/23/2023)     • oxyCODONE (ROXICODONE) 5 immediate release tablet 1 tablet by mouth every 4-6 hour as needed (Patient not taking: Reported on 9/20/2023) 10 tablet 0     No current facility-administered medications for this visit.            Objective:    Blood pressure 126/84, pulse 80, height 5' 2" (1.575 m), weight 71.4 kg (157 lb 6.4 oz), SpO2 95 %, not currently breastfeeding. Body mass index is 28.79 kg/m². Body surface area is 1.73 meters squared. Physical Exam  Constitutional:       Appearance: She is well-developed. HENT:      Head: Normocephalic and atraumatic. Eyes:      Pupils: Pupils are equal, round, and reactive to light. Cardiovascular:      Rate and Rhythm: Normal rate and regular rhythm. Heart sounds: Normal heart sounds. Pulmonary:      Effort: Pulmonary effort is normal. No respiratory distress. Breath sounds: Normal breath sounds. Abdominal:      General: Bowel sounds are normal. There is no distension. Palpations: Abdomen is soft. Abdomen is not rigid. Tenderness: There is no abdominal tenderness. There is no guarding or rebound. Genitourinary:     Comments: Mons with well-healed incision no fluctuance no erythema  Musculoskeletal:         General: Normal range of motion. Cervical back: Normal range of motion and neck supple. Lymphadenopathy:      Cervical: No cervical adenopathy. Upper Body:      Right upper body: No supraclavicular adenopathy. Left upper body: No supraclavicular adenopathy. Skin:     General: Skin is warm and dry. Neurological:      Mental Status: She is alert and oriented to person, place, and time.    Psychiatric:         Behavior: Behavior normal.         Lab Results   Component Value Date    WBC 9.59 08/14/2023    HGB 16.0 (H) 08/14/2023    HCT 46.5 (H) 08/14/2023    MCV 90 08/14/2023     08/14/2023     Lab Results   Component Value Date    NEUTROABS 7.32 08/14/2023        Trend:  No results found for: ""

## 2023-11-09 NOTE — PROGRESS NOTES
Usama Rodriguez   1970    CC:  Yearly exam    A:  Yearly exam.     Problem List Items Addressed This Visit    None  Visit Diagnoses       Encounter for gynecological examination (general) (routine) without abnormal findings    -  Primary    Relevant Orders    Liquid-based pap, screening    Encounter for screening mammogram for malignant neoplasm of breast        Relevant Orders    Mammo screening bilateral w 3d & cad            P:   Pap collected today. We reviewed ASCCP guidelines for Pap testing. Mammo slip given   Colonoscopy due, GI appt tomorrow    RTO one year for yearly exam or sooner as needed. S:  52 y.o. female here for yearly exam. She is postmenopausal and has had no vaginal bleeding since her last visit. She denies vaginal discharge, itching, odor or dryness. Sexual activity: She is not sexually active, does not want STD testing today. Menopausal    Last Pap:  NILM/HPV -  Last Mammo: 2023 BIRADS 1  Last Colonoscopy: 2020, 3yr recall    Non-smoker, social drinker  Exercises irregularly    Family hx of breast cancer: denies   Family hx of ovarian cancer: denies  Family hx of colon cancer: Mat.  Uncle       Current Outpatient Medications:     acetaminophen (TYLENOL) 325 mg tablet, Take 2 tablets (650 mg total) by mouth every 6 (six) hours as needed for mild pain, Disp: 60 tablet, Rfl: 0    albuterol (PROVENTIL HFA,VENTOLIN HFA) 90 mcg/act inhaler, Inhale 2 puffs every 6 (six) hours as needed for wheezing or shortness of breath, Disp: 1 Inhaler, Rfl: 5    ALPRAZolam (XANAX) 0.5 mg tablet, Take 0.5 mg by mouth 3 (three) times a day as needed, Disp: , Rfl:     aspirin (ECOTRIN LOW STRENGTH) 81 mg EC tablet, Take 81 mg by mouth daily, Disp: , Rfl:     atorvastatin (LIPITOR) 40 mg tablet, Take 40 mg by mouth daily, Disp: , Rfl:     DULoxetine (CYMBALTA) 60 mg delayed release capsule, Take 60 mg by mouth daily  , Disp: , Rfl:     Empagliflozin 25 MG TABS, Take 25 mg by mouth every morning, Disp: , Rfl:     esomeprazole (NexIUM) 40 MG capsule, TAKE 1 CAPSULE TWICE A DAY, Disp: 180 capsule, Rfl: 3    fluticasone (FLONASE) 50 mcg/act nasal spray, 1 spray into each nostril daily, Disp: 48 g, Rfl: 3    glimepiride (AMARYL) 1 mg tablet, Take 1 mg by mouth 2 (two) times a day , Disp: , Rfl:     ibuprofen (MOTRIN) 600 mg tablet, Take 1 tablet (600 mg total) by mouth every 6 (six) hours as needed for mild pain, Disp: 60 tablet, Rfl: 0    metFORMIN (GLUCOPHAGE) 500 mg tablet, Take 500 mg by mouth 2 (two) times a day, Disp: , Rfl:     simvastatin (ZOCOR) 40 mg tablet, Take 40 mg by mouth daily  , Disp: , Rfl:     SYNTHROID 50 MCG tablet, Take 50 mcg by mouth daily  , Disp: , Rfl:     meloxicam (MOBIC) 15 mg tablet, , Disp: , Rfl:   Social History     Socioeconomic History    Marital status:      Spouse name: Not on file    Number of children: Not on file    Years of education: Not on file    Highest education level: Not on file   Occupational History    Not on file   Tobacco Use    Smoking status: Every Day     Packs/day: 1.00     Years: 35.00     Total pack years: 35.00     Types: Cigarettes     Start date: 9/14/1983    Smokeless tobacco: Never   Vaping Use    Vaping Use: Never used   Substance and Sexual Activity    Alcohol use:  Yes     Alcohol/week: 2.0 standard drinks of alcohol     Types: 2 Cans of beer per week     Comment: occasionally    Drug use: No    Sexual activity: Not Currently     Birth control/protection: Post-menopausal, Female Sterilization   Other Topics Concern    Not on file   Social History Narrative    Not on file     Social Determinants of Health     Financial Resource Strain: Unknown (2/13/2023)    Overall Financial Resource Strain (CARDIA)     Difficulty of Paying Living Expenses: Patient refused   Food Insecurity: Not on file   Transportation Needs: Unknown (2/13/2023)    PRAPARE - Transportation     Lack of Transportation (Medical): Patient refused     Lack of Transportation (Non-Medical): Patient refused   Physical Activity: Inactive (1/20/2021)    Exercise Vital Sign     Days of Exercise per Week: 0 days     Minutes of Exercise per Session: 0 min   Stress: Stress Concern Present (1/20/2021)    [address] [facility]     Feeling of Stress : Very much   Social Connections: Not on file   Intimate Partner Violence: Not on file   Housing Stability: Not on file     Family History   Problem Relation Age of Onset    Lung cancer Mother     Cancer Mother         small cell lung    Melanoma Mother     Heart attack Father     Heart failure Father     Heart disease Father     Diabetes Maternal Grandmother     Heart attack Maternal Grandmother     Lung cancer Paternal Grandmother     Heart disease Paternal Grandfather     Colon cancer Maternal Uncle     Heart attack Maternal Uncle      Past Medical History:   Diagnosis Date    Adrenal adenoma 05/01/2019    Bipolar 1 disorder ([address])     Colitis     Diabetes mellitus ([address])     Diabetic gastroparesis  07/11/2016    Fibroid 2002    GERD (gastroesophageal reflux disease) 2010    Gestational diabetes 1996    HLD (hyperlipidemia)     Skin cancer 1998    Varicella 1970's        Review of Systems   Respiratory: Negative. Cardiovascular: Negative. Gastrointestinal: Negative for constipation and diarrhea. Genitourinary: Negative for difficulty urinating, pelvic pain, vaginal bleeding, vaginal discharge, itching or odor. O:  Blood pressure 116/82, height 5' 2" (1.575 m), weight 71.7 kg (158 lb), not currently breastfeeding. Patient appears well and is not in distress  Neck is supple without masses  Breasts are symmetrical without mass, tenderness, nipple discharge, skin changes or adenopathy. Abdomen is soft and nontender without masses. Vulva without lesions or rashes.   Urethral meatus and urethra are normal  Bladder is normal to palpation  Vagina is normal without discharge or bleeding. Cervix is normal without discharge or lesion. Uterus and adnexa are normal, mobile, nontender without palpable mass.   Rectovaginal exam without nodularity/masses/visible blood on glove

## 2023-11-10 RX ORDER — ATORVASTATIN CALCIUM 40 MG/1
40 TABLET, FILM COATED ORAL DAILY
COMMUNITY
Start: 2023-10-18 | End: 2024-10-17

## 2023-11-10 RX ORDER — ASPIRIN 81 MG/1
81 TABLET ORAL DAILY
COMMUNITY
Start: 2023-10-18 | End: 2024-10-17

## 2023-11-14 ENCOUNTER — ANNUAL EXAM (OUTPATIENT)
Age: 53
End: 2023-11-14
Payer: COMMERCIAL

## 2023-11-14 VITALS
DIASTOLIC BLOOD PRESSURE: 82 MMHG | SYSTOLIC BLOOD PRESSURE: 116 MMHG | HEIGHT: 62 IN | WEIGHT: 158 LBS | BODY MASS INDEX: 29.08 KG/M2

## 2023-11-14 DIAGNOSIS — Z12.31 ENCOUNTER FOR SCREENING MAMMOGRAM FOR MALIGNANT NEOPLASM OF BREAST: ICD-10-CM

## 2023-11-14 DIAGNOSIS — Z01.419 ENCOUNTER FOR GYNECOLOGICAL EXAMINATION (GENERAL) (ROUTINE) WITHOUT ABNORMAL FINDINGS: Primary | ICD-10-CM

## 2023-11-14 PROCEDURE — G0145 SCR C/V CYTO,THINLAYER,RESCR: HCPCS | Performed by: STUDENT IN AN ORGANIZED HEALTH CARE EDUCATION/TRAINING PROGRAM

## 2023-11-14 PROCEDURE — G0101 CA SCREEN;PELVIC/BREAST EXAM: HCPCS | Performed by: STUDENT IN AN ORGANIZED HEALTH CARE EDUCATION/TRAINING PROGRAM

## 2023-11-15 ENCOUNTER — OFFICE VISIT (OUTPATIENT)
Dept: GASTROENTEROLOGY | Facility: CLINIC | Age: 53
End: 2023-11-15
Payer: COMMERCIAL

## 2023-11-15 VITALS
SYSTOLIC BLOOD PRESSURE: 118 MMHG | HEIGHT: 62 IN | OXYGEN SATURATION: 94 % | WEIGHT: 157.2 LBS | BODY MASS INDEX: 28.93 KG/M2 | HEART RATE: 67 BPM | DIASTOLIC BLOOD PRESSURE: 84 MMHG

## 2023-11-15 DIAGNOSIS — Z86.010 HISTORY OF COLON POLYPS: ICD-10-CM

## 2023-11-15 DIAGNOSIS — Z12.11 COLON CANCER SCREENING: Primary | ICD-10-CM

## 2023-11-15 PROCEDURE — 99203 OFFICE O/P NEW LOW 30 MIN: CPT | Performed by: PHYSICIAN ASSISTANT

## 2023-11-15 NOTE — PATIENT INSTRUCTIONS
Scheduled date of colonoscopy (as of today):12/13/23  Physician performing colonoscopy: Jose  Location of colonoscopy:[place]  Bowel prep reviewed with patient:miralax/dulcolax  Instructions reviewed with patient by:Lisseth GALARZA  Clearances:  none

## 2023-11-15 NOTE — H&P (VIEW-ONLY)
[facility] - Outpatient Consultation  Dana Coon 53 y.o. female MRN: [id number]  Encounter: [id number]          ASSESSMENT AND PLAN:      1. Colon cancer screening  2. History of colon polyps  - Due for colonoscopy due to history of tubular adenomas, last colonoscopy was in 2020  - Schedule colonoscopy with miralax prep  - She has no alarm symptoms    ______________________________________________________________________    HPI:  Dana is a 54 yo F with a PMH of DM2, HLD, GERD, presenting to schedule a routine colonoscopy. Her last colonoscopy was in 2020 and she had 3 tubular adenomas removed. She denies any new problems or symptoms. She has chronic diarrhea for 20+ years without any changes in bowel habits or blood in the stool. She has chronic GERD which is controlled on nexium 40 mg 1-2 times daily.       REVIEW OF SYSTEMS:    CONSTITUTIONAL: Denies any fever, chills, rigors, and weight loss.  HEENT: No earache or tinnitus. Denies hearing loss or visual disturbances.  CARDIOVASCULAR: No chest pain or palpitations.   RESPIRATORY: Denies any cough, hemoptysis, shortness of breath or dyspnea on exertion.  GASTROINTESTINAL: As noted in the History of Present Illness.   GENITOURINARY: No problems with urination. Denies any hematuria or dysuria.  NEUROLOGIC: No dizziness or vertigo, denies headaches.   MUSCULOSKELETAL: Denies any muscle or joint pain.   SKIN: Denies skin rashes or itching.   ENDOCRINE: Denies excessive thirst. Denies intolerance to heat or cold.  PSYCHOSOCIAL: Denies depression or anxiety. Denies any recent memory loss.       Historical Information   Past Medical History:   Diagnosis Date    Adrenal adenoma 05/01/2019    Bipolar 1 disorder (HCC)     Colitis     Diabetes mellitus (HCC)     Diabetic gastroparesis  07/11/2016    Fibroid 2002    GERD (gastroesophageal reflux disease) 2010    Gestational diabetes 1996    HLD (hyperlipidemia)     Skin cancer 1998     Varicella 1970's     Past Surgical History:   Procedure Laterality Date    APPENDECTOMY       SECTION      CHOLECYSTECTOMY      HERNIA REPAIR      HYSTEROSCOPY W/ ENDOMETRIAL ABLATION  2015    Last assessed    ID VULVECTOMY SIMPLE PARTIAL N/A 2023    Procedure: VULVECTOMY SIMPLE PARTIAL;  Surgeon: Leonidas Sanchez MD;  Location: [facility];  Service: Gynecology Oncology    US GUIDED THYROID BIOPSY  2020    UTERINE FIBROID SURGERY      WISDOM TOOTH EXTRACTION       Social History   Social History     Substance and Sexual Activity   Alcohol Use Yes    Alcohol/week: 2.0 standard drinks of alcohol    Types: 2 Cans of beer per week    Comment: occasionally     Social History     Substance and Sexual Activity   Drug Use No     Social History     Tobacco Use   Smoking Status Every Day    Packs/day: 1.00    Years: 35.00    Total pack years: 35.00    Types: Cigarettes    Start date: 1983   Smokeless Tobacco Never     Family History   Problem Relation Age of Onset    Lung cancer Mother     Cancer Mother         small cell lung    Melanoma Mother     Heart attack Father     Heart failure Father     Heart disease Father     Diabetes Maternal Grandmother     Heart attack Maternal Grandmother     Lung cancer Paternal Grandmother     Heart disease Paternal Grandfather     Colon cancer Maternal Uncle     Heart attack Maternal Uncle        Meds/Allergies       Current Outpatient Medications:     acetaminophen (TYLENOL) 325 mg tablet    albuterol (PROVENTIL HFA,VENTOLIN HFA) 90 mcg/act inhaler    ALPRAZolam (XANAX) 0.5 mg tablet    aspirin (ECOTRIN LOW STRENGTH) 81 mg EC tablet    atorvastatin (LIPITOR) 40 mg tablet    DULoxetine (CYMBALTA) 60 mg delayed release capsule    Empagliflozin 25 MG TABS    esomeprazole (NexIUM) 40 MG capsule    fluticasone (FLONASE) 50 mcg/act nasal spray    glimepiride (AMARYL) 1 mg tablet    ibuprofen (MOTRIN) 600 mg tablet    metFORMIN (GLUCOPHAGE) 500 mg tablet     "simvastatin (ZOCOR) 40 mg tablet    SYNTHROID 50 MCG tablet    meloxicam (MOBIC) 15 mg tablet    No Known Allergies        Objective     Blood pressure 118/84, pulse 67, height 5' 2\" (1.575 m), weight 71.3 kg (157 lb 3.2 oz), SpO2 94 %, not currently breastfeeding. Body mass index is 28.75 kg/m².        PHYSICAL EXAM:      General Appearance:   Alert, cooperative, no distress   HEENT:   Normocephalic, atraumatic, anicteric.     Neck:  Supple, symmetrical, trachea midline   Lungs:   Clear to auscultation bilaterally; no rales, rhonchi or wheezing; respirations unlabored    Heart::   Regular rate and rhythm; no murmur, rub, or gallop.   Abdomen:   Soft, non-tender, non-distended; normal bowel sounds; no masses, no organomegaly    Genitalia:   Deferred    Rectal:   Deferred    Extremities:  No cyanosis, clubbing or edema    Pulses:  2+ and symmetric    Skin:  No jaundice, rashes, or lesions    Lymph nodes:  No palpable cervical lymphadenopathy        Lab Results:   No visits with results within 1 Day(s) from this visit.   Latest known visit with results is:   Orders Only on 08/28/2023   Component Date Value    Right Eye Diabetic Retin* 08/28/2023 None     Left Eye Diabetic Retino* 08/28/2023 None          Radiology Results:   No results found.  "

## 2023-11-15 NOTE — PROGRESS NOTES
[facility] - Outpatient Consultation  John Singleton 52 y.o. female MRN: [id number]  Encounter: [id number]          ASSESSMENT AND PLAN:      1. Colon cancer screening  2. History of colon polyps  - Due for colonoscopy due to history of tubular adenomas, last colonoscopy was in 2020  - Schedule colonoscopy with miralax prep  - She has no alarm symptoms    ______________________________________________________________________    HPI:  Moiz Coe is a 53 yo F with a PMH of DM2, HLD, GERD, presenting to schedule a routine colonoscopy. Her last colonoscopy was in 2020 and she had 3 tubular adenomas removed. She denies any new problems or symptoms. She has chronic diarrhea for 20+ years without any changes in bowel habits or blood in the stool. She has chronic GERD which is controlled on nexium 40 mg 1-2 times daily. REVIEW OF SYSTEMS:    CONSTITUTIONAL: Denies any fever, chills, rigors, and weight loss. HEENT: No earache or tinnitus. Denies hearing loss or visual disturbances. CARDIOVASCULAR: No chest pain or palpitations. RESPIRATORY: Denies any cough, hemoptysis, shortness of breath or dyspnea on exertion. GASTROINTESTINAL: As noted in the History of Present Illness. GENITOURINARY: No problems with urination. Denies any hematuria or dysuria. NEUROLOGIC: No dizziness or vertigo, denies headaches. MUSCULOSKELETAL: Denies any muscle or joint pain. SKIN: Denies skin rashes or itching. ENDOCRINE: Denies excessive thirst. Denies intolerance to heat or cold. PSYCHOSOCIAL: Denies depression or anxiety. Denies any recent memory loss.        Historical Information   Past Medical History:   Diagnosis Date    Adrenal adenoma 05/01/2019    Bipolar 1 disorder ([facility])     Colitis     Diabetes mellitus ([facility])     Diabetic gastroparesis  07/11/2016    Fibroid 2002    GERD (gastroesophageal reflux disease) 2010    Gestational diabetes 1996    HLD (hyperlipidemia)     Skin cancer 1998 Varicella 1970's     Past Surgical History:   Procedure Laterality Date    APPENDECTOMY       SECTION      CHOLECYSTECTOMY      HERNIA REPAIR      HYSTEROSCOPY W/ ENDOMETRIAL ABLATION  2015    Last assessed    RI VULVECTOMY SIMPLE PARTIAL N/A 2023    Procedure: VULVECTOMY SIMPLE PARTIAL;  Surgeon: Chayo Grover MD;  Location: [facility];  Service: Gynecology Oncology    US GUIDED THYROID BIOPSY  2020    UTERINE FIBROID SURGERY      WISDOM TOOTH EXTRACTION       Social History   Social History     Substance and Sexual Activity   Alcohol Use Yes    Alcohol/week: 2.0 standard drinks of alcohol    Types: 2 Cans of beer per week    Comment: occasionally     Social History     Substance and Sexual Activity   Drug Use No     Social History     Tobacco Use   Smoking Status Every Day    Packs/day: 1.00    Years: 35.00    Total pack years: 35.00    Types: Cigarettes    Start date: 1983   Smokeless Tobacco Never     Family History   Problem Relation Age of Onset    Lung cancer Mother     Cancer Mother         small cell lung    Melanoma Mother     Heart attack Father     Heart failure Father     Heart disease Father     Diabetes Maternal Grandmother     Heart attack Maternal Grandmother     Lung cancer Paternal Grandmother     Heart disease Paternal Grandfather     Colon cancer Maternal Uncle     Heart attack Maternal Uncle        Meds/Allergies       Current Outpatient Medications:     acetaminophen (TYLENOL) 325 mg tablet    albuterol (PROVENTIL HFA,VENTOLIN HFA) 90 mcg/act inhaler    ALPRAZolam (XANAX) 0.5 mg tablet    aspirin (ECOTRIN LOW STRENGTH) 81 mg EC tablet    atorvastatin (LIPITOR) 40 mg tablet    DULoxetine (CYMBALTA) 60 mg delayed release capsule    Empagliflozin 25 MG TABS    esomeprazole (NexIUM) 40 MG capsule    fluticasone (FLONASE) 50 mcg/act nasal spray    glimepiride (AMARYL) 1 mg tablet    ibuprofen (MOTRIN) 600 mg tablet    metFORMIN (GLUCOPHAGE) 500 mg tablet simvastatin (ZOCOR) 40 mg tablet    SYNTHROID 50 MCG tablet    meloxicam (MOBIC) 15 mg tablet    No Known Allergies        Objective     Blood pressure 118/84, pulse 67, height 5' 2" (1.575 m), weight 71.3 kg (157 lb 3.2 oz), SpO2 94 %, not currently breastfeeding. Body mass index is 28.75 kg/m². PHYSICAL EXAM:      General Appearance:   Alert, cooperative, no distress   HEENT:   Normocephalic, atraumatic, anicteric. Neck:  Supple, symmetrical, trachea midline   Lungs:   Clear to auscultation bilaterally; no rales, rhonchi or wheezing; respirations unlabored    Heart[de-identified]   Regular rate and rhythm; no murmur, rub, or gallop. Abdomen:   Soft, non-tender, non-distended; normal bowel sounds; no masses, no organomegaly    Genitalia:   Deferred    Rectal:   Deferred    Extremities:  No cyanosis, clubbing or edema    Pulses:  2+ and symmetric    Skin:  No jaundice, rashes, or lesions    Lymph nodes:  No palpable cervical lymphadenopathy        Lab Results:   No visits with results within 1 Day(s) from this visit. Latest known visit with results is:   Orders Only on 08/28/2023   Component Date Value    Right Eye Diabetic Retin* 08/28/2023 None     Left Eye Diabetic Retino* 08/28/2023 None          Radiology Results:   No results found.

## 2023-11-24 LAB
LAB AP GYN PRIMARY INTERPRETATION: NORMAL
Lab: NORMAL

## 2023-11-30 ENCOUNTER — TELEPHONE (OUTPATIENT)
Dept: GASTROENTEROLOGY | Facility: CLINIC | Age: 53
End: 2023-11-30

## 2023-12-13 ENCOUNTER — HOSPITAL ENCOUNTER (OUTPATIENT)
Dept: GASTROENTEROLOGY | Facility: HOSPITAL | Age: 53
Setting detail: OUTPATIENT SURGERY
Discharge: HOME | End: 2023-12-13
Admitting: INTERNAL MEDICINE
Payer: COMMERCIAL

## 2023-12-13 ENCOUNTER — ANESTHESIA (OUTPATIENT)
Dept: GASTROENTEROLOGY | Facility: HOSPITAL | Age: 53
End: 2023-12-13

## 2023-12-13 ENCOUNTER — ANESTHESIA EVENT (OUTPATIENT)
Dept: GASTROENTEROLOGY | Facility: HOSPITAL | Age: 53
End: 2023-12-13

## 2023-12-13 VITALS
HEART RATE: 66 BPM | RESPIRATION RATE: 18 BRPM | WEIGHT: 155.65 LBS | DIASTOLIC BLOOD PRESSURE: 68 MMHG | SYSTOLIC BLOOD PRESSURE: 119 MMHG | OXYGEN SATURATION: 98 % | BODY MASS INDEX: 29.39 KG/M2 | HEIGHT: 61 IN | TEMPERATURE: 97.6 F

## 2023-12-13 DIAGNOSIS — Z86.010 HISTORY OF COLON POLYPS: ICD-10-CM

## 2023-12-13 DIAGNOSIS — Z12.11 COLON CANCER SCREENING: ICD-10-CM

## 2023-12-13 LAB — GLUCOSE SERPL-MCNC: 160 MG/DL (ref 65–140)

## 2023-12-13 PROCEDURE — 45385 COLONOSCOPY W/LESION REMOVAL: CPT | Performed by: INTERNAL MEDICINE

## 2023-12-13 PROCEDURE — 82948 REAGENT STRIP/BLOOD GLUCOSE: CPT

## 2023-12-13 PROCEDURE — 88305 TISSUE EXAM BY PATHOLOGIST: CPT | Performed by: STUDENT IN AN ORGANIZED HEALTH CARE EDUCATION/TRAINING PROGRAM

## 2023-12-13 RX ORDER — KETAMINE HYDROCHLORIDE 50 MG/ML
INJECTION, SOLUTION INTRAMUSCULAR; INTRAVENOUS AS NEEDED
Status: DISCONTINUED | OUTPATIENT
Start: 2023-12-13 | End: 2023-12-13

## 2023-12-13 RX ORDER — LIDOCAINE HYDROCHLORIDE 10 MG/ML
INJECTION, SOLUTION EPIDURAL; INFILTRATION; INTRACAUDAL; PERINEURAL AS NEEDED
Status: DISCONTINUED | OUTPATIENT
Start: 2023-12-13 | End: 2023-12-13

## 2023-12-13 RX ORDER — SODIUM CHLORIDE, SODIUM LACTATE, POTASSIUM CHLORIDE, CALCIUM CHLORIDE 600; 310; 30; 20 MG/100ML; MG/100ML; MG/100ML; MG/100ML
INJECTION, SOLUTION INTRAVENOUS CONTINUOUS PRN
Status: DISCONTINUED | OUTPATIENT
Start: 2023-12-13 | End: 2023-12-13

## 2023-12-13 RX ADMIN — KETAMINE HYDROCHLORIDE 50 MG: 50 INJECTION INTRAMUSCULAR; INTRAVENOUS at 08:35

## 2023-12-13 RX ADMIN — KETAMINE HYDROCHLORIDE 100 MG: 50 INJECTION INTRAMUSCULAR; INTRAVENOUS at 08:33

## 2023-12-13 RX ADMIN — KETAMINE HYDROCHLORIDE 50 MG: 50 INJECTION INTRAMUSCULAR; INTRAVENOUS at 08:38

## 2023-12-13 RX ADMIN — SODIUM CHLORIDE, SODIUM LACTATE, POTASSIUM CHLORIDE, AND CALCIUM CHLORIDE: .6; .31; .03; .02 INJECTION, SOLUTION INTRAVENOUS at 07:28

## 2023-12-13 RX ADMIN — LIDOCAINE HYDROCHLORIDE 50 MG: 10 INJECTION, SOLUTION EPIDURAL; INFILTRATION; INTRACAUDAL at 08:32

## 2023-12-13 NOTE — ANESTHESIA POSTPROCEDURE EVALUATION
Please see ENC dated 8/3/19.   Post-Op Assessment Note    CV Status:  Stable    Pain management: adequate       Mental Status:  Alert and awake   Hydration Status:  Euvolemic   PONV Controlled:  Controlled   Airway Patency:  Patent     Post Op Vitals Reviewed: Yes      Staff: CRNA               /67 (12/13/23 0844)    Temp 97.6 °F (36.4 °C) (12/13/23 0844)    Pulse 76 (12/13/23 0844)   Resp 16 (12/13/23 0844)    SpO2 92 % (12/13/23 0844)

## 2023-12-13 NOTE — INTERVAL H&P NOTE
H&P reviewed. After examining the patient I find no changes in the patients condition since the H&P had been written.    Vitals:    12/13/23 0715   BP: 136/85   Pulse: 86   Resp: 16   Temp: 97.8 °F (36.6 °C)   SpO2: 97%

## 2023-12-13 NOTE — ANESTHESIA PREPROCEDURE EVALUATION
Procedure:  COLONOSCOPY    Relevant Problems   CARDIO   (+) Mixed hyperlipidemia due to type 2 diabetes mellitus       ENDO   (+) Hypothyroidism (acquired)   (+) Type 2 diabetes mellitus without complication, without long-term current use of insulin (HCC)      GI/HEPATIC   (+) Chronic gastroesophageal reflux disease      Other   (+) Bipolar disorder (HCC)      Diabetes mellitus (HCC)    Bipolar 1 disorder (HCC)    Colitis    Diabetic gastroparesis    HLD (hyperlipidemia)    Adrenal adenoma    Varicella    Fibroid    Gestational diabetes    Skin cancer    GERD (gastroesophageal reflux disease)    Colon polyp      Physical Exam    Airway    Mallampati score: II  TM Distance: >3 FB  Neck ROM: full     Dental       Cardiovascular  Cardiovascular exam normal    Pulmonary  Pulmonary exam normal     Other Findings  post-pubertal.      Anesthesia Plan  ASA Score- 2     Anesthesia Type- IV sedation with anesthesia with ASA Monitors. Additional Monitors:     Airway Plan:            Plan Factors-Exercise tolerance (METS): >4 METS. Chart reviewed. EKG reviewed. Imaging results reviewed. Existing labs reviewed. Patient summary reviewed. Induction- intravenous. Postoperative Plan-     Informed Consent- Anesthetic plan and risks discussed with patient. I personally reviewed this patient with the CRNA. Discussed and agreed on the Anesthesia Plan with the CRNA. Nadira Solomon

## 2023-12-13 NOTE — DISCHARGE INSTRUCTIONS
Colonoscopy   WHAT YOU NEED TO KNOW:   A colonoscopy is a procedure to examine the inside of your colon (intestine) with a scope. Polyps or tissue growths may have been removed during your colonoscopy. It is normal to feel bloated and to have some abdominal discomfort. You should be passing gas. If you have hemorrhoids or you had polyps removed, you may have a small amount of bleeding.        DISCHARGE INSTRUCTIONS:   Seek care immediately if:   You have sudden, severe abdominal pain.     You have problems swallowing.     You have a large amount of black, sticky bowel movements or blood in your bowel movements.     You have sudden trouble breathing.     You feel weak, lightheaded, or faint or your heart beats faster than normal for you.     Contact your healthcare provider if:   You have a fever and chills.      You have nausea or are vomiting.      Your abdomen is bloated or feels full and hard.     You have abdominal pain.   You have black, sticky bowel movements or blood in your bowel movements.  You have not had a bowel movement for 3 days after your procedure.  You have rash or hives.  You have questions or concerns about your procedure.    Activity:   Do not lift, strain, or run for 24 hours after your procedure.     Rest after your procedure. You have been given medicine to relax you. Do not drive or make important decisions until the day after your procedure. Return to your normal activity as directed.     Relieve gas and discomfort from bloating by lying on your right side with a heating pad on your abdomen. You may need to take short walks to help the gas move out. Eat small meals until bloating is relieved.  Follow up with your healthcare provider as directed: Write down your questions so you remember to ask them during your visits.     If you take a “blood thinner”, please review the specific instructions from your endoscopist about when you should resume it. These can be found in the “Recommendation”  and “Your Medication list” sections of this After Visit Summary.

## 2023-12-15 ENCOUNTER — OFFICE VISIT (OUTPATIENT)
Age: 53
End: 2023-12-15
Payer: COMMERCIAL

## 2023-12-15 VITALS
TEMPERATURE: 97.5 F | DIASTOLIC BLOOD PRESSURE: 74 MMHG | OXYGEN SATURATION: 96 % | SYSTOLIC BLOOD PRESSURE: 122 MMHG | HEIGHT: 61 IN | BODY MASS INDEX: 30.13 KG/M2 | WEIGHT: 159.6 LBS | HEART RATE: 82 BPM | RESPIRATION RATE: 18 BRPM

## 2023-12-15 DIAGNOSIS — E11.69 MIXED HYPERLIPIDEMIA DUE TO TYPE 2 DIABETES MELLITUS: Chronic | ICD-10-CM

## 2023-12-15 DIAGNOSIS — I25.10 CAD IN NATIVE ARTERY: ICD-10-CM

## 2023-12-15 DIAGNOSIS — M25.532 LEFT WRIST PAIN: Primary | ICD-10-CM

## 2023-12-15 DIAGNOSIS — E11.65 TYPE 2 DIABETES MELLITUS WITH HYPERGLYCEMIA, WITHOUT LONG-TERM CURRENT USE OF INSULIN (HCC): Chronic | ICD-10-CM

## 2023-12-15 DIAGNOSIS — J98.01 BRONCHOSPASM: ICD-10-CM

## 2023-12-15 DIAGNOSIS — E78.2 MIXED HYPERLIPIDEMIA DUE TO TYPE 2 DIABETES MELLITUS: Chronic | ICD-10-CM

## 2023-12-15 DIAGNOSIS — J30.9 ALLERGIC RHINITIS, UNSPECIFIED SEASONALITY, UNSPECIFIED TRIGGER: ICD-10-CM

## 2023-12-15 PROCEDURE — 99214 OFFICE O/P EST MOD 30 MIN: CPT | Performed by: INTERNAL MEDICINE

## 2023-12-15 RX ORDER — FLUTICASONE PROPIONATE 50 MCG
1 SPRAY, SUSPENSION (ML) NASAL DAILY
Qty: 48 G | Refills: 3 | Status: SHIPPED | OUTPATIENT
Start: 2023-12-15

## 2023-12-15 RX ORDER — ALBUTEROL SULFATE 90 UG/1
2 AEROSOL, METERED RESPIRATORY (INHALATION) EVERY 6 HOURS PRN
Qty: 18 G | Refills: 5 | Status: SHIPPED | OUTPATIENT
Start: 2023-12-15

## 2023-12-15 NOTE — PROGRESS NOTES
Name: Kenneth Cruz      : 1970      MRN: [id number]  Encounter Provider: Miguel Penn DO  Encounter Date: 12/15/2023   Encounter department: [address]     1. Left wrist pain    Check x-ray of the left wrist and refer to hand surgery. - XR wrist 3+ vw left; Future  - Ambulatory Referral to Hand Surgery; Future    2. Bronchospasm  3. Allergic rhinitis    We will refill her prescriptions for Flonase and albuterol inhaler. - albuterol (PROVENTIL HFA,VENTOLIN HFA) 90 mcg/act inhaler; Inhale 2 puffs every 6 (six) hours as needed for wheezing or shortness of breath  Dispense: 18 g; Refill: 5  - fluticasone (FLONASE) 50 mcg/act nasal spray; 1 spray into each nostril daily  Dispense: 48 g; Refill: 3    4. Type 2 diabetes mellitus with hyperglycemia, without long-term current use of insulin (HCC)  5. Mixed hyperlipidemia due to type 2 diabetes mellitus     She follows regularly with endocrinology. Continue current antidiabetic medications as prescribed. Continue atorvastatin 40 mg for hyperlipidemia. 6. Non-obstructive CAD    She had a coronary catheterization all the way back in . She did see [facility] who did not order any further testing for her. She would like to know her degree of plaque buildup currently. Discussed doing a CT coronary calcium score. The patient agreed to do this test.    - CT coronary calcium score; Future          Return in about 6 months (around 6/15/2024) for Follow-up. Subjective      Kenneth Cruz is a 20-year-old female who presents for followup due to concerns of a lump on her left hand/wrist area. Left hand/wrist lump. The onset of left-hand wrist lump was noticed 2 months ago, characterized by a bone-like structure. Initially it is not painful, but it progresses since she started touching multiple times. A certain movement of her hand such as picking triggers pain. Nausea and vertigo.   She has a total of 13 colonoscopies, last colonoscopy was 2 years ago. She has been experiencing nausea and vertigo. She does not have a problem with anesthesia. She is uncertain if her symptoms are related to ear problems. She has lightheadedness even when sitting. Her nose is congested, and she experiences persistent pain. She denies any vision changes, spots in her eyes, blurry vision, or floaters. She had a colonoscopy 2 years ago. Non obstructive coronary artery disease. She has no plan in doing a follow-up visit with her cardiologist at [facility] and requested for a referral. She plans to move to [place] next summer and stay there for 2 years. She was not informed by her doctor that she has emphysema. She experiences dyspnea. She smokes around 40 years. She tries to reduce her smoking habits. Her father  in his 45s from arterial thrombosis. All her diagnostic tests consistently reveal the presence of calcifications. She is concerned about how obstructed her arteries. Mental health. She has been seeing her psychiatrist virtually for 2 years. She has been taking Xanax for many years. She has taken Xanax at bedtime without benefits. Review of systems:  The pertinent positive and negative findings are as noted in the HPI. Objective     /74 (BP Location: Left arm, Patient Position: Sitting, Cuff Size: Large)   Pulse 82   Temp 97.5 °F (36.4 °C) (Tympanic)   Resp 18   Ht 5' 1" (1.549 m)   Wt 72.4 kg (159 lb 9.6 oz)   SpO2 96%   BMI 30.16 kg/m²     Physical Exam  Constitutional:       General: She is not in acute distress. Appearance: She is not ill-appearing. Cardiovascular:      Rate and Rhythm: Normal rate and regular rhythm. Heart sounds: No murmur heard. Pulmonary:      Effort: Pulmonary effort is normal. No respiratory distress. Breath sounds: No wheezing. Abdominal:      General: Bowel sounds are normal. There is no distension. Tenderness:  There is no abdominal tenderness. Musculoskeletal:         General: Deformity (bony prominence left wrist) present. Right lower leg: No edema. Left lower leg: No edema. Neurological:      Mental Status: She is alert. Transcribed for Lor Bobby DO, by Maribeth Castaneda on 12/15/23 at 1:53 PM. Powered by [email or web address].

## 2023-12-15 NOTE — PATIENT INSTRUCTIONS
Type 2 Diabetes Management for Adults   WHAT YOU NEED TO KNOW:   What do I need to know about type 2 diabetes management? Type 2 diabetes is a disease that affects how your body uses glucose (sugar). Either your body cannot make enough insulin, or it cannot use the insulin correctly. It is important to keep diabetes controlled to prevent damage to your heart, blood vessels, and other organs. Management will help you feel well and enjoy your daily activities. Your diabetes care team providers can help you make a plan to fit diabetes care into your schedule. Your plan can change over time to fit your needs and your family's needs. What do I need to know about high blood sugar levels? High blood sugar levels may not cause any symptoms. You may feel more thirsty or urinate more often than usual. Over time, high blood sugar levels can damage your nerves, blood vessels, tissues, and organs. The following can increase your blood sugar levels:  Large meals or large amounts of carbohydrates at one time    Less physical activity    Stress    Illness    A lower dose of diabetes medicine or insulin, or a late dose    What do I need to know about low blood sugar levels? Symptoms include feeling shaky, dizzy, irritable, or confused. You can prevent symptoms by keeping your blood sugar levels from going too low. Treat a low blood sugar level right away:      Drink 4 ounces of juice or have 1 tube of glucose gel. Check your blood sugar level again 10 to 15 minutes later. When the level goes back to normal, eat a meal or snack to prevent another decrease. Keep glucose gel, raisins, or hard candy with you at all times to treat a low blood sugar level. Your blood sugar level can get too low if you take diabetes medicine or insulin and do not eat enough food. If you use insulin, check your blood sugar level before you exercise.       If your blood sugar level is below 100 mg/dL, eat 4 crackers or 2 ounces of raisins, or drink 4 ounces of juice. Check your level every 30 minutes if you exercise longer than 1 hour. You may need a snack during or after exercise. What can I do to manage my blood sugar levels? Check your blood sugar levels as directed and as needed. Several items are available to use to check your levels. You may need to check by testing a drop of blood in a glucose monitor. You may instead be given a continuous glucose monitoring (CGM) device. The device is worn at all times. The CGM checks your blood sugar level every 5 minutes. It sends results to an electronic device such as a smart phone. A CGM can be used with or without an insulin pump. You and your diabetes care team providers will decide on the best method for you. The goal for blood sugar levels before meals  is between 80 and 130 mg/dL and 2 hours after eating  is lower than 180 mg/dL. Make healthy food choices. Work with a dietitian to create a meal plan that works for you and your schedule. A dietitian can help you learn how to eat the right amount of carbohydrates (sugar and starchy foods) during your meals and snacks. Examples of carbohydrates are breads, cereals, rice, pasta, fruit, low-fat dairy, and sweets. Carbohydrates can raise your blood sugar level if you eat too many at one time. Eat high-fiber foods as directed. Fiber helps improve blood sugar levels. Fiber also lowers your risk for heart disease and other problems diabetes can cause. Examples of high-fiber foods include vegetables, whole-grain bread, and beans such as mauricio beans. Your dietitian can tell you how much fiber to have each day. Get regular physical activity. Physical activity can help you get to your target blood sugar level goal and manage your weight. Get at least 150 minutes of moderate to vigorous aerobic physical activity each week. Resistance training, such as lifting weights, should be done 3 times each week.  Do not miss more than 2 days of physical activity in a row. Do not sit longer than 30 minutes at a time. Your healthcare provider can help you create an activity plan. The plan can include the best activities for you and can help you build your strength and endurance. Maintain a healthy weight. Ask your team what a healthy weight is for you. A healthy weight can help you control diabetes and prevent heart disease. Ask your team to help you create a weight-loss plan, if needed. Even a loss of 3% to 7% of your excess body weight can help make a difference in managing diabetes. Your team will help you set a weight-loss goal, such as 10 to 15 pounds, or 5% of your extra weight. Together you and your team can set manageable weight-loss goals. Take your diabetes medicine or insulin as directed. You may need diabetes medicine, insulin, or both to help control your blood sugar levels. Your provider will teach you how and when to take your diabetes medicine or insulin. You will also be taught about side effects oral diabetes medicine can cause. Insulin may be injected or given through a pump or pen. You and your providers will decide on the best method for you: An insulin pump  is an implanted device that gives your insulin 24 hours a day. An insulin pump prevents the need for multiple insulin injections in a day. An insulin pen  is a device prefilled with the right amount of insulin. You and your family members will be taught how to draw up and give insulin  if this is the best method for you. Your providers will also teach you how to dispose of needles and syringes. You will learn how much insulin you need  and when to give it. You will be taught when not to give insulin. You will also be taught what to do if your blood sugar level drops too low. This may happen if you take insulin and do not eat the right amount of carbohydrates. What else can I do to manage type 2 diabetes?    Wear medical alert identification. Wear medical alert jewelry or carry a card that says you have diabetes. Ask your care team provider where to get these items. Do not smoke. Nicotine and other chemicals in cigarettes and cigars can cause lung and blood vessel damage. It also makes it more difficult to manage your diabetes. Ask your provider for information if you currently smoke and need help to quit. Do not use e-cigarettes or smokeless tobacco in place of cigarettes or to help you quit. They still contain nicotine. Check your feet each day for cuts, scratches, calluses, or other wounds. Look for redness and swelling, and feel for warmth. Wear shoes that fit well. Check your shoes for rocks or other objects that can hurt your feet. Do not walk barefoot or wear shoes without socks. Wear cotton socks to help keep your feet dry. Ask about vaccines you may need. You have a higher risk for serious illness if you get the flu, pneumonia, COVID-19, or hepatitis. Ask your provider if you should get vaccines to prevent these or other diseases, and when to get the vaccines. Talk to your provider if you become stressed about diabetes care. Sometimes being able to fit diabetes care into your life can cause increased stress. The stress can cause you not to take care of yourself properly. Your provider can help by offering tips about self-care. A mental health provider can listen and offer help with self-care issues. Other types of counseling can help you make nutrition or physical activity changes. Have your A1c checked as directed. Your provider may check your A1c every 3 months, or 2 times each year if your diabetes is controlled. An A1c test shows the average amount of sugar in your blood over the past 2 to 3 months. Your provider will tell you what your A1c level should be. Have screening tests as directed.   Your provider may recommend screening for complications of diabetes and other conditions that may develop. Some screenings may begin right away and some may happen within the first 5 years of diagnosis:    Examples of diabetes complications  include kidney problems, high cholesterol, high blood pressure, blood vessel problems, eye problems, and sleep apnea. You may be screened for a low vitamin B level  if you take oral diabetes medicine for a long time. You may be screened for polycystic ovarian syndrome (PCOS)  if you are of childbearing age. Have someone call your local emergency number (911 in the [address]) if:   You cannot be woken. You have signs of diabetic ketoacidosis:     confusion, fatigue    vomiting    rapid heartbeat    fruity smelling breath    extreme thirst    dry mouth and skin    You have any of the following signs of a heart attack:      Squeezing, pressure, or pain in your chest    You may  also have any of the following:     Discomfort or pain in your back, neck, jaw, stomach, or arm    Shortness of breath    Nausea or vomiting    Lightheadedness or a sudden cold sweat    You have any of the following signs of a stroke:      Numbness or drooping on one side of your face     Weakness in an arm or leg    Confusion or difficulty speaking    Dizziness, a severe headache, or vision loss    When should I call my doctor or diabetes care team provider? You have a sore or wound that will not heal.    You have a change in the amount you urinate. Your blood sugar levels are higher than your target goals. You often have lower blood sugar levels than your target goals. Your skin is red, dry, warm, or swollen. You have trouble coping with diabetes, or you feel anxious or depressed. You have trouble following any part of your care plan, such as your meal plan. You have questions or concerns about your condition or care. CARE AGREEMENT:   You have the right to help plan your care. Learn about your health condition and how it may be treated.  Discuss treatment options with your healthcare providers to decide what care you want to receive. You always have the right to refuse treatment. The above information is an  only. It is not intended as medical advice for individual conditions or treatments. Talk to your doctor, nurse or pharmacist before following any medical regimen to see if it is safe and effective for you. © Copyright Elisha Goltz 2023 Information is for End User's use only and may not be sold, redistributed or otherwise used for commercial purposes.

## 2023-12-16 PROCEDURE — 88305 TISSUE EXAM BY PATHOLOGIST: CPT | Performed by: STUDENT IN AN ORGANIZED HEALTH CARE EDUCATION/TRAINING PROGRAM

## 2023-12-26 ENCOUNTER — TELEPHONE (OUTPATIENT)
Dept: GASTROENTEROLOGY | Facility: CLINIC | Age: 53
End: 2023-12-26

## 2023-12-26 NOTE — TELEPHONE ENCOUNTER
Called and spoke with pt in regards to her biopsy results as per Dr. Garcia. Pt verbalized understanding.

## 2023-12-26 NOTE — TELEPHONE ENCOUNTER
----- Message from Ramez Garcia III, MD sent at 12/26/2023  7:27 AM EST -----  Please review with the patient    TANYA Cortez-     One of the polyps was precancerous and 1 was benign.  You will be due for colonoscopy in 3 years.  We will call you then.  Have a good day.

## 2024-01-04 ENCOUNTER — OFFICE VISIT (OUTPATIENT)
Age: 54
End: 2024-01-04
Payer: COMMERCIAL

## 2024-01-04 VITALS
DIASTOLIC BLOOD PRESSURE: 80 MMHG | OXYGEN SATURATION: 99 % | TEMPERATURE: 96.3 F | RESPIRATION RATE: 18 BRPM | WEIGHT: 159 LBS | HEART RATE: 78 BPM | SYSTOLIC BLOOD PRESSURE: 124 MMHG | BODY MASS INDEX: 30.02 KG/M2 | HEIGHT: 61 IN

## 2024-01-04 DIAGNOSIS — H66.002 NON-RECURRENT ACUTE SUPPURATIVE OTITIS MEDIA OF LEFT EAR WITHOUT SPONTANEOUS RUPTURE OF TYMPANIC MEMBRANE: Primary | ICD-10-CM

## 2024-01-04 PROCEDURE — 99213 OFFICE O/P EST LOW 20 MIN: CPT

## 2024-01-04 RX ORDER — METHYLPREDNISOLONE 4 MG/1
TABLET ORAL
Qty: 21 EACH | Refills: 0 | Status: SHIPPED | OUTPATIENT
Start: 2024-01-04

## 2024-01-04 RX ORDER — AMOXICILLIN AND CLAVULANATE POTASSIUM 875; 125 MG/1; MG/1
1 TABLET, FILM COATED ORAL EVERY 12 HOURS SCHEDULED
Qty: 10 TABLET | Refills: 0 | Status: SHIPPED | OUTPATIENT
Start: 2024-01-04 | End: 2024-01-09

## 2024-01-04 RX ORDER — GLIMEPIRIDE 2 MG/1
TABLET ORAL
COMMUNITY
Start: 2023-12-20

## 2024-01-04 NOTE — PROGRESS NOTES
Name: Dana Coon      : 1970      MRN: [id number]  Encounter Provider: Genet Johnson PA-C  Encounter Date: 2024   Encounter department: [facility] [place]    Assessment & Plan     1. Non-recurrent acute suppurative otitis media of left ear without spontaneous rupture of tympanic membrane  -     amoxicillin-clavulanate (AUGMENTIN) 875-125 mg per tablet; Take 1 tablet by mouth every 12 (twelve) hours for 5 days  -     methylPREDNISolone 4 MG tablet therapy pack; Use as directed on package       Continue with decongestant, flonase, nasal lavage.  Suspect this is a eustachian tube dysfunction that has turned into an otitis media on the left.  Change to Augmentin for 5 days.  Pt is diabetic.  Will also give medrol dosepak for eustachian tube inflammation, watch blood sugars.   No evidence of otitis externa or malignant otitis.    Subjective      Sinus Problem  Associated symptoms include ear pain and a sore throat. Pertinent negatives include no congestion, coughing, diaphoresis, shortness of breath, sinus pressure or sneezing.       Pt has had ear fullness, ear pain on the left, PND, sore throat.  No fevers, N/V.  Present for 8 days.    Pt has done Flonase, lavage, and used azithromycin that she had at home and never used.  She is not improved.  She has been on antibiotics many times in the past and says the z-akosua usually works for sinus infections.  She thought this would work for her ear pain.    Review of Systems   Constitutional:  Negative for diaphoresis, fatigue and fever.   HENT:  Positive for ear pain, postnasal drip and sore throat. Negative for congestion, drooling, ear discharge, facial swelling, hearing loss, mouth sores, rhinorrhea, sinus pressure, sneezing, trouble swallowing and voice change.    Eyes:  Negative for visual disturbance.   Respiratory:  Negative for cough and shortness of breath.    Cardiovascular:  Negative for chest pain and palpitations.  "  Gastrointestinal: Negative.    Musculoskeletal:  Negative for gait problem.   Skin:  Negative for rash.   Neurological:  Negative for syncope and facial asymmetry.   All other systems reviewed and are negative.      Current Outpatient Medications on File Prior to Visit   Medication Sig   • acetaminophen (TYLENOL) 325 mg tablet Take 2 tablets (650 mg total) by mouth every 6 (six) hours as needed for mild pain   • albuterol (PROVENTIL HFA,VENTOLIN HFA) 90 mcg/act inhaler Inhale 2 puffs every 6 (six) hours as needed for wheezing or shortness of breath   • ALPRAZolam (XANAX) 0.5 mg tablet Take 0.5 mg by mouth 3 (three) times a day as needed   • aspirin (ECOTRIN LOW STRENGTH) 81 mg EC tablet Take 81 mg by mouth daily   • atorvastatin (LIPITOR) 40 mg tablet Take 40 mg by mouth daily   • DULoxetine (CYMBALTA) 60 mg delayed release capsule Take 60 mg by mouth daily     • Empagliflozin 25 MG TABS Take 25 mg by mouth every morning   • esomeprazole (NexIUM) 40 MG capsule TAKE 1 CAPSULE TWICE A DAY   • fluticasone (FLONASE) 50 mcg/act nasal spray 1 spray into each nostril daily   • glimepiride (AMARYL) 1 mg tablet Take 1 mg by mouth 2 (two) times a day    • glimepiride (AMARYL) 2 mg tablet    • ibuprofen (MOTRIN) 600 mg tablet Take 1 tablet (600 mg total) by mouth every 6 (six) hours as needed for mild pain   • metFORMIN (GLUCOPHAGE) 500 mg tablet Take 500 mg by mouth 2 (two) times a day   • SYNTHROID 50 MCG tablet Take 50 mcg by mouth daily         Objective     /80 (BP Location: Right arm, Patient Position: Sitting, Cuff Size: Standard)   Pulse 78   Temp (!) 96.3 °F (35.7 °C) (Tympanic)   Resp 18   Ht 5' 1\" (1.549 m)   Wt 72.1 kg (159 lb)   SpO2 99%   BMI 30.04 kg/m²     Physical Exam  Vitals reviewed.   Constitutional:       General: She is not in acute distress.     Appearance: Normal appearance. She is not toxic-appearing.   HENT:      Head: Normocephalic and atraumatic.      Right Ear: Hearing, ear canal " and external ear normal. No drainage or tenderness. A middle ear effusion is present. Tympanic membrane is not erythematous.      Left Ear: Hearing, ear canal and external ear normal. Tenderness present. No drainage. A middle ear effusion is present. Tympanic membrane is erythematous.      Nose: Nose normal.      Mouth/Throat:      Lips: Pink.      Mouth: Mucous membranes are moist.   Eyes:      General: Lids are normal. No scleral icterus.  Cardiovascular:      Rate and Rhythm: Normal rate and regular rhythm.      Heart sounds: Normal heart sounds.   Pulmonary:      Effort: Pulmonary effort is normal. No respiratory distress.      Breath sounds: Normal breath sounds.   Skin:     General: Skin is warm and dry.   Neurological:      General: No focal deficit present.      Mental Status: She is alert and oriented to person, place, and time.       Genet Johnson PA-C

## 2024-01-08 ENCOUNTER — HOSPITAL ENCOUNTER (OUTPATIENT)
Dept: RADIOLOGY | Facility: HOSPITAL | Age: 54
Discharge: HOME | End: 2024-01-08
Payer: COMMERCIAL

## 2024-01-08 ENCOUNTER — APPOINTMENT (OUTPATIENT)
Dept: LAB | Facility: HOSPITAL | Age: 54
End: 2024-01-08
Payer: COMMERCIAL

## 2024-01-08 DIAGNOSIS — L72.0 EPIDERMOID CYST OF SKIN OF INGUINAL REGION: ICD-10-CM

## 2024-01-08 DIAGNOSIS — M25.532 LEFT WRIST PAIN: ICD-10-CM

## 2024-01-08 PROCEDURE — 73110 X-RAY EXAM OF WRIST: CPT

## 2024-01-15 ENCOUNTER — OFFICE VISIT (OUTPATIENT)
Dept: OBGYN CLINIC | Facility: MEDICAL CENTER | Age: 54
End: 2024-01-15
Payer: COMMERCIAL

## 2024-01-15 VITALS
DIASTOLIC BLOOD PRESSURE: 76 MMHG | BODY MASS INDEX: 30.02 KG/M2 | HEART RATE: 90 BPM | WEIGHT: 159 LBS | HEIGHT: 61 IN | SYSTOLIC BLOOD PRESSURE: 126 MMHG

## 2024-01-15 DIAGNOSIS — M65.4 DE QUERVAIN'S TENOSYNOVITIS, LEFT: Primary | ICD-10-CM

## 2024-01-15 DIAGNOSIS — M25.532 LEFT WRIST PAIN: ICD-10-CM

## 2024-01-15 PROCEDURE — 99204 OFFICE O/P NEW MOD 45 MIN: CPT | Performed by: ORTHOPAEDIC SURGERY

## 2024-01-15 RX ORDER — MONTELUKAST SODIUM 10 MG/1
TABLET ORAL
COMMUNITY
Start: 2024-01-08

## 2024-01-15 RX ORDER — SULFAMETHOXAZOLE AND TRIMETHOPRIM 800; 160 MG/1; MG/1
1 TABLET ORAL 2 TIMES DAILY
COMMUNITY
Start: 2024-01-08

## 2024-01-15 NOTE — PROGRESS NOTES
[facility] [place]  [address]  [facility] [address]       Dana Coon  [id number]  1970    ORTHOPAEDIC SURGERY OUTPATIENT NOTE  1/15/2024      HISTORY:  53 y.o. female Presents today consultation for left wrist pain.  She was referred by her PCP Dr. Severino.  Patient states she has been having left wrist pain for the last 2 months.  Denies any injuries.  She was recently seen by her PCP and had an x-ray on 2024 and states possible nondisplaced scaphoid fracture.  Patient states she is having pain over the distal radius region rating into her thumb.  She states she has pain with twisting and tenderness to the touch.  She has not tried bracing.  She is current not taking pain medications.  She is right-hand dominant.    Past Medical History:   Diagnosis Date    Adrenal adenoma 2019    Bipolar 1 disorder (HCC)     Colitis     Colon polyp     Diabetes mellitus (HCC)     Diabetic gastroparesis  2016    Fibroid     GERD (gastroesophageal reflux disease)     Gestational diabetes     HLD (hyperlipidemia)     Skin cancer     Varicella 's       Past Surgical History:   Procedure Laterality Date    APPENDECTOMY       SECTION      CHOLECYSTECTOMY      HERNIA REPAIR      HYSTEROSCOPY W/ ENDOMETRIAL ABLATION  2015    Last assessed    ND VULVECTOMY SIMPLE PARTIAL N/A 2023    Procedure: VULVECTOMY SIMPLE PARTIAL;  Surgeon: Leonidas Sanchez MD;  Location: [facility];  Service: Gynecology Oncology    US GUIDED THYROID BIOPSY  2020    UTERINE FIBROID SURGERY      WISDOM TOOTH EXTRACTION         Social History     Socioeconomic History    Marital status:      Spouse name: Not on file    Number of children: Not on file    Years of education: Not on file    Highest education level: Not on file   Occupational History    Not on file   Tobacco Use    Smoking status: Every Day     Current packs/day:  1.00     Average packs/day: 1 pack/day for 40.3 years (40.3 ttl pk-yrs)     Types: Cigarettes     Start date: 9/14/1983    Smokeless tobacco: Never   Vaping Use    Vaping status: Never Used   Substance and Sexual Activity    Alcohol use: Yes     Alcohol/week: 2.0 standard drinks of alcohol     Types: 2 Cans of beer per week     Comment: occasionally    Drug use: No    Sexual activity: Not Currently     Birth control/protection: Post-menopausal, Female Sterilization   Other Topics Concern    Not on file   Social History Narrative    Not on file     Social Determinants of Health     Financial Resource Strain: Patient Declined (2/13/2023)    Overall Financial Resource Strain (CARDIA)     Difficulty of Paying Living Expenses: Patient declined   Food Insecurity: Not on file   Transportation Needs: Patient Declined (2/13/2023)    PRAPARE - Transportation     Lack of Transportation (Medical): Patient declined     Lack of Transportation (Non-Medical): Patient declined   Physical Activity: Inactive (1/20/2021)    Exercise Vital Sign     Days of Exercise per Week: 0 days     Minutes of Exercise per Session: 0 min   Stress: Stress Concern Present (1/20/2021)    [employer] - Occupational Stress Questionnaire     Feeling of Stress : Very much   Social Connections: Not on file   Intimate Partner Violence: Not on file   Housing Stability: Not on file       Family History   Problem Relation Age of Onset    Lung cancer Mother     Cancer Mother         small cell lung    Melanoma Mother     Heart attack Father     Heart failure Father     Heart disease Father     Diabetes Maternal Grandmother     Heart attack Maternal Grandmother     Lung cancer Paternal Grandmother     Heart disease Paternal Grandfather     Colon cancer Maternal Uncle     Heart attack Maternal Uncle         Patient's Medications   New Prescriptions    No medications on file   Previous Medications    ACETAMINOPHEN (TYLENOL) 325 MG TABLET  "   Take 2 tablets (650 mg total) by mouth every 6 (six) hours as needed for mild pain    ALBUTEROL (PROVENTIL HFA,VENTOLIN HFA) 90 MCG/ACT INHALER    Inhale 2 puffs every 6 (six) hours as needed for wheezing or shortness of breath    ALPRAZOLAM (XANAX) 0.5 MG TABLET    Take 0.5 mg by mouth 3 (three) times a day as needed    ASPIRIN (ECOTRIN LOW STRENGTH) 81 MG EC TABLET    Take 81 mg by mouth daily    ATORVASTATIN (LIPITOR) 40 MG TABLET    Take 40 mg by mouth daily    DULOXETINE (CYMBALTA) 60 MG DELAYED RELEASE CAPSULE    Take 60 mg by mouth daily      EMPAGLIFLOZIN 25 MG TABS    Take 25 mg by mouth every morning    ESOMEPRAZOLE (NEXIUM) 40 MG CAPSULE    TAKE 1 CAPSULE TWICE A DAY    FLUTICASONE (FLONASE) 50 MCG/ACT NASAL SPRAY    1 spray into each nostril daily    GLIMEPIRIDE (AMARYL) 1 MG TABLET    Take 1 mg by mouth 2 (two) times a day     GLIMEPIRIDE (AMARYL) 2 MG TABLET        IBUPROFEN (MOTRIN) 600 MG TABLET    Take 1 tablet (600 mg total) by mouth every 6 (six) hours as needed for mild pain    METFORMIN (GLUCOPHAGE) 500 MG TABLET    Take 500 mg by mouth 2 (two) times a day    METHYLPREDNISOLONE 4 MG TABLET THERAPY PACK    Use as directed on package    MONTELUKAST (SINGULAIR) 10 MG TABLET    1 TABLET AT NIGHT    SULFAMETHOXAZOLE-TRIMETHOPRIM (BACTRIM DS) 800-160 MG PER TABLET    Take 1 tablet by mouth 2 (two) times a day    SYNTHROID 50 MCG TABLET    Take 50 mcg by mouth daily     Modified Medications    No medications on file   Discontinued Medications    No medications on file       No Known Allergies     Ht 5' 1\" (1.549 m)   Wt 72.1 kg (159 lb)   BMI 30.04 kg/m²      REVIEW OF SYSTEMS:  Constitutional: Negative.    HEENT: Negative.    Respiratory: Negative.    Skin: Negative.    Neurological: Negative.    Psychiatric/Behavioral: Negative.  Musculoskeletal: Negative except for that mentioned in the HPI    PHYSICAL EXAM:    Left wrist   Skin intact  No erythema  Tenderness to palpation over the first dorsal " compartment  Palpable mass at the radial styloid, likely tendinous sheath ganglion cyst  Positive finklesteins test    IMAGING:  XR left wrist demonstrates Possible occult scaphoid fracture     ASSESSMENT AND PLAN:  53 y.o. female   left wrist De Quervain;s Tenosynovitis       XR left wrist was reviewed in the office today. Patient on exam today examined for De Quervain's Tenosynovitis. Discussed with patient conservative treatment which included bracing, topical cream for pain relief and steroid injection. Provided patient with thumb spica brace to be worn with activities. She is to apply Diclofenac gel 4 times over the region for pain relief. IF she has no relief after 6 weeks, may consider steroid injection. She is to follow up PRN.               Scribe Attestation      I,:  Erick Lao am acting as a scribe while in the presence of the attending physician.:       I,:  Tru Mullins, DO personally performed the services described in this documentation    as scribed in my presence.:

## 2024-06-18 ENCOUNTER — HOSPITAL ENCOUNTER (OUTPATIENT)
Age: 54
Discharge: HOME | End: 2024-06-18
Payer: COMMERCIAL

## 2024-06-18 DIAGNOSIS — Z12.31 ENCOUNTER FOR SCREENING MAMMOGRAM FOR MALIGNANT NEOPLASM OF BREAST: ICD-10-CM

## 2024-06-18 LAB — MAMMOGRAPHY, EXTERNAL: NORMAL

## 2024-06-18 PROCEDURE — 77063 BREAST TOMOSYNTHESIS BI: CPT

## 2024-06-18 PROCEDURE — 77067 SCR MAMMO BI INCL CAD: CPT

## 2024-06-20 LAB
ESTIMATED AVERAGE GLUCOSE: NORMAL
HBA1C MFR BLD: 7.4 %

## 2024-06-27 ENCOUNTER — OFFICE VISIT (OUTPATIENT)
Age: 54
End: 2024-06-27
Payer: COMMERCIAL

## 2024-06-27 VITALS
SYSTOLIC BLOOD PRESSURE: 124 MMHG | BODY MASS INDEX: 30.21 KG/M2 | WEIGHT: 160 LBS | RESPIRATION RATE: 18 BRPM | DIASTOLIC BLOOD PRESSURE: 78 MMHG | TEMPERATURE: 97.9 F | HEIGHT: 61 IN | HEART RATE: 76 BPM | OXYGEN SATURATION: 96 %

## 2024-06-27 DIAGNOSIS — E78.2 MIXED HYPERLIPIDEMIA DUE TO TYPE 2 DIABETES MELLITUS  (HCC): ICD-10-CM

## 2024-06-27 DIAGNOSIS — F31.76 BIPOLAR DISORDER, IN FULL REMISSION, MOST RECENT EPISODE DEPRESSED (HCC): ICD-10-CM

## 2024-06-27 DIAGNOSIS — E11.65 TYPE 2 DIABETES MELLITUS WITH HYPERGLYCEMIA, WITHOUT LONG-TERM CURRENT USE OF INSULIN (HCC): Primary | Chronic | ICD-10-CM

## 2024-06-27 DIAGNOSIS — Z00.00 MEDICARE ANNUAL WELLNESS VISIT, SUBSEQUENT: ICD-10-CM

## 2024-06-27 DIAGNOSIS — Z11.59 NEED FOR HEPATITIS C SCREENING TEST: ICD-10-CM

## 2024-06-27 DIAGNOSIS — F17.210 SMOKING GREATER THAN 20 PACK YEARS: ICD-10-CM

## 2024-06-27 DIAGNOSIS — E11.69 MIXED HYPERLIPIDEMIA DUE TO TYPE 2 DIABETES MELLITUS  (HCC): ICD-10-CM

## 2024-06-27 PROCEDURE — G0439 PPPS, SUBSEQ VISIT: HCPCS | Performed by: INTERNAL MEDICINE

## 2024-06-27 PROCEDURE — 99214 OFFICE O/P EST MOD 30 MIN: CPT | Performed by: INTERNAL MEDICINE

## 2024-06-27 NOTE — PATIENT INSTRUCTIONS
"Patient Education     High cholesterol   The Basics   Written by the doctors and editors at [facility]   What is cholesterol? -- Cholesterol is a substance found in blood. Everyone has some. It is needed for good health. But people sometimes have too much cholesterol.  Compared with people with normal cholesterol, people with high cholesterol have a higher risk of heart attack, stroke, and other health problems. The higher your cholesterol, the higher your risk of these problems.  Are there different types of cholesterol? -- Yes, there are a few different types. If you get a cholesterol test, you might hear your doctor or nurse talk about:   Total cholesterol   LDL cholesterol - Some people call this the \"bad\" cholesterol. That's because having high LDL levels raises your risk of heart attack, stroke, and other health problems.   HDL cholesterol - Some people call this the \"good\" cholesterol. That's because people with high HDL levels tend to have a lower risk of heart attack, stroke, and other health problems.   Non-HDL cholesterol - Non-HDL cholesterol is your total cholesterol minus your HDL cholesterol.   Triglycerides - Triglycerides are not cholesterol. They are another type of fat. But they often get measured when cholesterol is measured. (Having high triglycerides also seems to increase the risk of heart attack and stroke.)  What should my numbers be? -- Ask your doctor or nurse what your numbers should be. Different people need different goals. If you live outside of the [place], see the table (table 1).  In general, people who do not already have heart disease should aim for:   Total cholesterol below 200   LDL cholesterol below 130, or much lower if they are at risk of heart attack or stroke   HDL cholesterol above 60   Non-HDL cholesterol below 160, or lower if they are at risk of heart attack or stroke   Triglycerides below 150  Remember, though, that many people who cannot meet these goals still have a low " "risk of heart attack and stroke.  What should I do if I have high cholesterol? -- Ask your doctor what your overall risk of heart attack and stroke is. Just having high cholesterol is not always a reason to worry. Having high cholesterol is just one of many things that can increase your risk of heart attack and stroke.  Other things that increase your risk include:   Smoking   High blood pressure   Having a parent or sibling who got heart disease at a young age - Young, in this case, means younger than 55 for males and younger than 65 for females.   A diet that is not heart healthy - A \"heart-healthy\" diet includes lots of fruits and vegetables, fiber, and healthy fats (like those found in fish, nuts, and certain oils). It also means limiting sugar and unhealthy fats.   Older age  If you are at high risk of heart attack and stroke, having high cholesterol is a problem. But if you are at low risk, high cholesterol might not need treatment.  Should I take medicine to lower cholesterol? -- Not everyone who has high cholesterol needs medicines. Your doctor or nurse will decide if you need them based on your age, family history, and other health concerns.  There are many different medicines used to lower cholesterol (table 2). Some help your body make less cholesterol. Some keep your body from absorbing cholesterol from foods. Some help your body get rid of cholesterol faster. The medicines most often used to treat high cholesterol are called \"statins.\"  You should probably take a statin if you:   Already had a heart attack or stroke   Have known heart disease   Have diabetes   Have a condition called \"peripheral artery disease,\" which makes it painful to walk, and happens when the arteries in your legs get clogged with fatty deposits   Have an \"abdominal aortic aneurysm,\" which is a widening of the main artery in the belly  Most people with any of the conditions listed above should take a statin no matter what their " "cholesterol level is. If your doctor or nurse prescribes a statin, it's important to keep taking it. The medicine might not make you feel any different. But it can help prevent heart attack, stroke, and death.  If your doctor or nurse recommends taking medicine to help lower your cholesterol, make sure that you know what it is called. Follow all the instructions for how to take it. For example, some medicines work better when you take them in the evening. Some need to be taken with food.  Tell your doctor or nurse if your medicine causes any side effects that bother you. They might be able to switch you to a different medicine.  Can I lower my cholesterol without medicines? -- Yes. You can help lower your cholesterol by doing these things:   You can lower your LDL, or \"bad,\" cholesterol by avoiding red meat, butter, fried foods, cheese, and other foods that have a lot of saturated fat.   You can lower triglycerides by avoiding sugary foods, fried foods, and excess alcohol.   If you have excess weight, it can help to lose weight. Your doctor or nurse can help you do this in a healthy way.   Try to get regular physical activity. Even gentle forms of exercise, like walking, are good for your health.  Even if these steps don't change your cholesterol very much, they can improve your health in many other ways.  All topics are updated as new evidence becomes available and our peer review process is complete.  This topic retrieved from [employer] on: Mar 01, 2024.  Topic 42048 Version 25.0  Release: 32.2.4 - C32.59  © 2024 UpToDate, Inc. and/or its affiliates. All rights reserved.  table 1: Cholesterol and triglyceride measurements in the [place] and elsewhere     Measurement used within the [place] Milligrams/deciliter (mg/dL)  Measurement used most places outside of the [place] Millimoles/liter (mmol/Liter)     Level to aim for  Level to aim for    Total cholesterol  Below 200 Below 5.17   LDL cholesterol  Below 130, or much lower if at " risk of heart attack and stroke Below 3.36, or much lower if at risk of heart attack and stroke   HDL cholesterol  Above 60 Above 1.55   Triglycerides  Below 150 Below 1.7   Cholesterol is measured differently in the [place] than it is in most other countries. This table shows values used within and outside of the [place]. It includes the cholesterol and triglyceride levels that most people who do not have heart disease should aim for.  Graphic 78625 Version 5.0  table 2: Lipid-lowering medicines  Generic name  Brand name    Statins    Atorvastatin Lipitor   Fluvastatin Lescol, Lescol XL   Lovastatin Mevacor, Altoprev   Pitavastatin Livalo   Pravastatin Pravachol   Rosuvastatin Crestor   Simvastatin Zocor   PCSK9 inhibitors    Alirocumab Praluent   Evolocumab Repatha, Repatha SureClick   Cholesterol absorption inhibitors    Ezetimibe Zetia   Bile acid sequestrants    Cholestyramine Prevalite, Questran, Questran Light   Colesevelam Welchol   Colestipol Colestid   Niacin (nicotinic acid)    Niacin immediate release     Niacin extended release Niaspan   Fibrates    Fenofibrate Fenoglide, Tricor, Triglide, others   Gemfibrozil Lopid   Brand names listed are for medicines available in the [place] and some other countries.  Graphic 89175 Version 7.0  Consumer Information Use and Disclaimer   Disclaimer: This generalized information is a limited summary of diagnosis, treatment, and/or medication information. It is not meant to be comprehensive and should be used as a tool to help the user understand and/or assess potential diagnostic and treatment options. It does NOT include all information about conditions, treatments, medications, side effects, or risks that may apply to a specific patient. It is not intended to be medical advice or a substitute for the medical advice, diagnosis, or treatment of a health care provider based on the health care provider's examination and assessment of a patient's specific and unique circumstances.  Patients must speak with a health care provider for complete information about their health, medical questions, and treatment options, including any risks or benefits regarding use of medications. This information does not endorse any treatments or medications as safe, effective, or approved for treating a specific patient. UpToDate, Inc. and its affiliates disclaim any warranty or liability relating to this information or the use thereof.The use of this information is governed by the Terms of Use, available at https://www.woltersMasterImage 3Duwer.com/en/know/clinical-effectiveness-terms. 2024© UpToDate, Inc. and its affiliates and/or licensors. All rights reserved.  Copyright   © 2024 UpToDate, Inc. and/or its affiliates. All rights reserved.

## 2024-06-27 NOTE — PROGRESS NOTES
Ambulatory Visit  Name: Dana Coon      : 1970      MRN: [id number]  Encounter Provider: Arcadio Severino DO  Encounter Date: 2024   Encounter department: [facility] [place]    Assessment & Plan   1. Type 2 diabetes mellitus with hyperglycemia, without long-term current use of insulin (HCC)  2. Mixed hyperlipidemia due to type 2 diabetes mellitus  (HCC)    Most recent A1c was 7.4 % on 2024.    Stable and numbers could be a little better. If improves her diet and lifestyle, will see reductions in A1c and triglycerides. No changes to medications at this time.    - Hemoglobin A1C; Future  - Comprehensive metabolic panel; Future  - Lipid Panel with Direct LDL reflex; Future  - CBC and Platelet; Future    3. Smoking greater than 20 pack years      I discussed with her that she is a candidate for lung cancer CT screening.     The following Shared Decision-Making points were covered:  Benefits of screening were discussed, including the rates of reduction in death from lung cancer and other causes.  Harms of screening were reviewed, including false positive tests, radiation exposure levels, risks of invasive procedures, risks of complications of screening, and risk of overdiagnosis.  I counseled on the importance of adherence to annual lung cancer LDCT screening, impact of co-morbidities, and ability or willingness to undergo diagnosis and treatment.  I counseled on the importance of maintaining abstinence as a former smoker or was counseled on the importance of smoking cessation if a current smoker    Review of Eligibility Criteria: She meets all of the criteria for Lung Cancer Screening.   She is 53 y.o.   She has 20 pack year tobacco history and is a current smoker or has quit within the past 15 years  She presents no signs or symptoms of lung cancer    After discussion, the patient decided to elect lung cancer screening.    - CT lung screening program; Future    4. Bipolar  disorder, in full remission, most recent episode depressed (HCC)    Stable at this time. Continue current medications.    5. Medicare annual wellness visit, subsequent    6. Need for hepatitis C screening test  - Hepatitis C Antibody; Future        Preventive health issues were discussed with patient, and age appropriate screening tests were ordered as noted in patient's After Visit Summary. Personalized health advice and appropriate referrals for health education or preventive services given if needed, as noted in patient's After Visit Summary.    History of Present Illness     History of Present Illness  The patient presents for follow-up of chronic medical conditions. Most recent A1c was 7.4 % on 6/20/2024. Also due for medicare AWV.    The patient, a part-time resident in [place], has expressed a desire to transition her cholesterol medication from simvastatin to Lipitor. Approximately a year ago, she initiated Lipitor, which resulted in some reduction in her triglyceride levels, albeit with elevated levels. She expresses a preference against additional medication for her triglyceride levels.    Admits she can eat better but she just loves food. She eats even when she isn't hungry. No GLP drugs due to gastroparesis.     Patient Care Team:  Arcadio Severino DO as PCP - General (Internal Medicine)  Arcadio Severino DO as PCP - PCP-[facility] ([facility])  Lashawn Celeste PA-C (Gastroenterology)    Review of Systems   Constitutional:  Negative for activity change, appetite change and fatigue.   Respiratory:  Negative for apnea, cough, chest tightness, shortness of breath and wheezing.    Cardiovascular:  Negative for chest pain, palpitations and leg swelling.   Gastrointestinal:  Negative for abdominal distention, abdominal pain, blood in stool, constipation, diarrhea, nausea and vomiting.   Neurological:  Negative for dizziness, weakness, light-headedness, numbness and headaches.   Psychiatric/Behavioral:   Negative for behavioral problems, confusion, hallucinations, sleep disturbance and suicidal ideas. The patient is not nervous/anxious.      Medical History Reviewed by provider this encounter:  Tobacco  Allergies  Meds  Problems  Med Hx  Surg Hx  Fam Hx       Annual Wellness Visit Questionnaire   Dana is here for her Subsequent Wellness visit.     Health Risk Assessment:   Patient rates overall health as fair. Patient feels that their physical health rating is same. Patient is satisfied with their life. Eyesight was rated as slightly worse. Hearing was rated as slightly worse. Patient feels that their emotional and mental health rating is same. Patients states they are sometimes angry. Patient states they are never, rarely unusually tired/fatigued. Pain experienced in the last 7 days has been none. Patient states that she has experienced no weight loss or gain in last 6 months.     Depression Screening:   PHQ-2 Score: 2      Fall Risk Screening:   In the past year, patient has experienced: history of falling in past year    Number of falls: 1  Injured during fall?: Yes    Feels unsteady when standing or walking?: No    Worried about falling?: No      Urinary Incontinence Screening:   Patient has not leaked urine accidently in the last six months.     Home Safety:  Patient does not have trouble with stairs inside or outside of their home. Patient has working smoke alarms and has working carbon monoxide detector. Home safety hazards include: none.     Nutrition:   Current diet is Regular.     Medications:   Patient is currently taking over-the-counter supplements. OTC medications include: see medication list. Patient is able to manage medications.     Activities of Daily Living (ADLs)/Instrumental Activities of Daily Living (IADLs):   Walk and transfer into and out of bed and chair?: Yes  Dress and groom yourself?: Yes    Bathe or shower yourself?: Yes    Feed yourself? Yes  Do your laundry/housekeeping?:  Yes  Manage your money, pay your bills and track your expenses?: Yes  Make your own meals?: Yes    Do your own shopping?: Yes    Previous Hospitalizations:   Any hospitalizations or ED visits within the last 12 months?: Yes    How many hospitalizations have you had in the last year?: 1-2    Advance Care Planning:   Living will: No      PREVENTIVE SCREENINGS      Cardiovascular Screening:    General: Screening Not Indicated and History Lipid Disorder      Diabetes Screening:     General: Screening Not Indicated and History Diabetes      Colorectal Cancer Screening:     General: Screening Current      Breast Cancer Screening:     General: Screening Current      Cervical Cancer Screening:    General: Screening Current    Screening, Brief Intervention, and Referral to Treatment (SBIRT)    Screening  Typical number of drinks in a day: 0  Typical number of drinks in a week: 0  Interpretation: Low risk drinking behavior.    AUDIT-C Screenin) How often did you have a drink containing alcohol in the past year? never  2) How many drinks did you have on a typical day when you were drinking in the past year? 0  3) How often did you have 6 or more drinks on one occasion in the past year? never    AUDIT-C Score: 0  Interpretation: Score 0-2 (female): Negative screen for alcohol misuse    Single Item Drug Screening:  How often have you used an illegal drug (including marijuana) or a prescription medication for non-medical reasons in the past year? never    Single Item Drug Screen Score: 0  Interpretation: Negative screen for possible drug use disorder    SDOH Risk Assessment  Social determinants of health (SDOH) risk assesment tool was completed. The tool at a minimum covered housing stability, food insecurity, transportation needs, and utility difficulty. Patient had at risk responses for the following SDOH domains: utilities.     Social Determinants of Health     Financial Resource Strain: Patient Declined (2023)     "Overall Financial Resource Strain (CARDIA)     Difficulty of Paying Living Expenses: Patient declined   Food Insecurity: No Food Insecurity (6/27/2024)    Hunger Vital Sign     Worried About Running Out of Food in the Last Year: Never true     Ran Out of Food in the Last Year: Never true   Transportation Needs: No Transportation Needs (6/27/2024)    PRAPARE - Transportation     Lack of Transportation (Medical): No     Lack of Transportation (Non-Medical): No   Housing Stability: Low Risk  (6/27/2024)    Housing Stability Vital Sign     Unable to Pay for Housing in the Last Year: No     Number of Times Moved in the Last Year: 0     Homeless in the Last Year: No   Utilities: At Risk (6/27/2024)    [facility] Utilities     Threatened with loss of utilities: Yes     No results found.    Objective     /78 (BP Location: Left arm, Patient Position: Sitting, Cuff Size: Large)   Pulse 76   Temp 97.9 °F (36.6 °C) (Tympanic)   Resp 18   Ht 5' 1\" (1.549 m)   Wt 72.6 kg (160 lb) Comment: with shoe son  SpO2 96%   BMI 30.23 kg/m²     Physical Exam  Constitutional:       General: She is not in acute distress.     Appearance: She is not ill-appearing.   Cardiovascular:      Rate and Rhythm: Normal rate and regular rhythm.      Pulses: no weak pulses.           Dorsalis pedis pulses are 2+ on the right side and 2+ on the left side.        Posterior tibial pulses are 2+ on the right side and 2+ on the left side.      Heart sounds: No murmur heard.  Pulmonary:      Effort: Pulmonary effort is normal. No respiratory distress.      Breath sounds: No wheezing.   Abdominal:      General: Bowel sounds are normal. There is no distension.      Tenderness: There is no abdominal tenderness.   Musculoskeletal:      Right lower leg: No edema.      Left lower leg: No edema.   Feet:      Right foot:      Skin integrity: No ulcer, skin breakdown, erythema, warmth, callus or dry skin.      Left foot:      Skin integrity: No ulcer, skin " breakdown, erythema, warmth, callus or dry skin.   Neurological:      Mental Status: She is alert.       Diabetic Foot Exam  Patient's shoes and socks removed.    Right Foot/Ankle   Right Foot Inspection  Skin Exam: skin normal and skin intact. No dry skin, no warmth, no callus, no erythema, no maceration, no abnormal color, no pre-ulcer, no ulcer and no callus.     Toe Exam: ROM and strength within normal limits.     Sensory   Monofilament testing: intact    Vascular  Capillary refills: < 3 seconds  The right DP pulse is 2+. The right PT pulse is 2+.     Left Foot/Ankle  Left Foot Inspection  Skin Exam: skin normal and skin intact. No dry skin, no warmth, no erythema, no maceration, normal color, no pre-ulcer, no ulcer and no callus.     Toe Exam: ROM and strength within normal limits.     Sensory   Monofilament testing: intact    Vascular  Capillary refills: < 3 seconds  The left DP pulse is 2+. The left PT pulse is 2+.     Assign Risk Category  No deformity present  No loss of protective sensation  No weak pulses  Risk: 0

## 2024-07-09 ENCOUNTER — HOSPITAL ENCOUNTER (OUTPATIENT)
Dept: CT IMAGING | Facility: CLINIC | Age: 54
Discharge: HOME | End: 2024-07-09
Payer: COMMERCIAL

## 2024-07-09 DIAGNOSIS — F17.210 SMOKING GREATER THAN 20 PACK YEARS: ICD-10-CM

## 2024-07-09 PROCEDURE — 71271 CT THORAX LUNG CANCER SCR C-: CPT

## 2024-07-12 ENCOUNTER — TELEPHONE (OUTPATIENT)
Age: 54
End: 2024-07-12

## 2024-07-12 NOTE — TELEPHONE ENCOUNTER
----- Message from Arcadio [facility]oIn[facility] HospitalDO sent at 7/12/2024  7:20 AM EDT -----  Lung cancer screening was negative. Repeat CT scan in 1 year.

## 2024-08-28 DIAGNOSIS — K21.9 GASTROESOPHAGEAL REFLUX DISEASE WITHOUT ESOPHAGITIS: ICD-10-CM

## 2024-08-29 RX ORDER — ESOMEPRAZOLE MAGNESIUM 40 MG/1
CAPSULE, DELAYED RELEASE ORAL
Qty: 180 CAPSULE | Refills: 1 | Status: SHIPPED | OUTPATIENT
Start: 2024-08-29

## 2024-09-09 ENCOUNTER — APPOINTMENT (RX ONLY)
Dept: URBAN - NONMETROPOLITAN AREA CLINIC 1 | Facility: CLINIC | Age: 54
Setting detail: DERMATOLOGY
End: 2024-09-09

## 2024-09-09 DIAGNOSIS — L57.0 ACTINIC KERATOSIS: ICD-10-CM | Status: INADEQUATELY CONTROLLED

## 2024-09-09 DIAGNOSIS — L82.1 OTHER SEBORRHEIC KERATOSIS: ICD-10-CM | Status: UNCHANGED

## 2024-09-09 DIAGNOSIS — D18.0 HEMANGIOMA: ICD-10-CM | Status: UNCHANGED

## 2024-09-09 DIAGNOSIS — L57.8 OTHER SKIN CHANGES DUE TO CHRONIC EXPOSURE TO NONIONIZING RADIATION: ICD-10-CM | Status: STABLE

## 2024-09-09 DIAGNOSIS — D22 MELANOCYTIC NEVI: ICD-10-CM | Status: UNCHANGED

## 2024-09-09 DIAGNOSIS — L81.4 OTHER MELANIN HYPERPIGMENTATION: ICD-10-CM | Status: UNCHANGED

## 2024-09-09 DIAGNOSIS — Z85.828 PERSONAL HISTORY OF OTHER MALIGNANT NEOPLASM OF SKIN: ICD-10-CM | Status: RESOLVED

## 2024-09-09 PROBLEM — D18.01 HEMANGIOMA OF SKIN AND SUBCUTANEOUS TISSUE: Status: ACTIVE | Noted: 2024-09-09

## 2024-09-09 PROBLEM — D22.62 MELANOCYTIC NEVI OF LEFT UPPER LIMB, INCLUDING SHOULDER: Status: ACTIVE | Noted: 2024-09-09

## 2024-09-09 PROBLEM — D22.5 MELANOCYTIC NEVI OF TRUNK: Status: ACTIVE | Noted: 2024-09-09

## 2024-09-09 PROCEDURE — ? LIQUID NITROGEN

## 2024-09-09 PROCEDURE — ? SUNSCREEN RECOMMENDATIONS

## 2024-09-09 PROCEDURE — ? MDM - TREATMENT GOALS

## 2024-09-09 PROCEDURE — 99203 OFFICE O/P NEW LOW 30 MIN: CPT | Mod: 25

## 2024-09-09 PROCEDURE — ? PHOTO-DOCUMENTATION

## 2024-09-09 PROCEDURE — ? COUNSELING

## 2024-09-09 PROCEDURE — 17000 DESTRUCT PREMALG LESION: CPT

## 2024-09-09 PROCEDURE — ? OBSERVATION

## 2024-09-09 PROCEDURE — ? FULL BODY SKIN EXAM

## 2024-09-09 ASSESSMENT — LOCATION ZONE DERM
LOCATION ZONE: LEG
LOCATION ZONE: NECK
LOCATION ZONE: ARM
LOCATION ZONE: TRUNK
LOCATION ZONE: FACE

## 2024-09-09 ASSESSMENT — LOCATION DETAILED DESCRIPTION DERM
LOCATION DETAILED: SUPERIOR MID FOREHEAD
LOCATION DETAILED: RIGHT MID-UPPER BACK
LOCATION DETAILED: RIGHT PROXIMAL DORSAL FOREARM
LOCATION DETAILED: RIGHT ANTERIOR PROXIMAL THIGH
LOCATION DETAILED: LEFT PROXIMAL DORSAL FOREARM
LOCATION DETAILED: LEFT PROXIMAL POSTERIOR UPPER ARM
LOCATION DETAILED: LEFT INFERIOR LATERAL NECK
LOCATION DETAILED: RIGHT SUPERIOR UPPER BACK

## 2024-09-09 ASSESSMENT — LOCATION SIMPLE DESCRIPTION DERM
LOCATION SIMPLE: RIGHT UPPER BACK
LOCATION SIMPLE: LEFT FOREARM
LOCATION SIMPLE: LEFT POSTERIOR UPPER ARM
LOCATION SIMPLE: RIGHT FOREARM
LOCATION SIMPLE: SUPERIOR FOREHEAD
LOCATION SIMPLE: RIGHT THIGH
LOCATION SIMPLE: LEFT ANTERIOR NECK

## 2024-09-09 NOTE — HPI: EVALUATION OF SKIN LESION(S)
Rx listed below. Preferred Pharmacy has been set up and verified.        
What Type Of Note Output Would You Prefer (Optional)?: Bullet Format
Hpi Title: Evaluation of Skin Lesions
How Severe Are Your Spot(S)?: mild
Have Your Spot(S) Been Treated In The Past?: has not been treated
Family Member: Aunts

## 2024-10-07 SDOH — HEALTH STABILITY: PHYSICAL HEALTH: ON AVERAGE, HOW MANY MINUTES DO YOU ENGAGE IN EXERCISE AT THIS LEVEL?: 30 MIN

## 2024-10-07 SDOH — HEALTH STABILITY: PHYSICAL HEALTH: ON AVERAGE, HOW MANY DAYS PER WEEK DO YOU ENGAGE IN MODERATE TO STRENUOUS EXERCISE (LIKE A BRISK WALK)?: 3 DAYS

## 2024-10-10 ENCOUNTER — OFFICE VISIT (OUTPATIENT)
Dept: FAMILY MEDICINE CLINIC | Facility: CLINIC | Age: 54
End: 2024-10-10
Payer: MEDICARE

## 2024-10-10 VITALS
HEART RATE: 78 BPM | BODY MASS INDEX: 28.56 KG/M2 | WEIGHT: 155.2 LBS | SYSTOLIC BLOOD PRESSURE: 118 MMHG | TEMPERATURE: 98 F | HEIGHT: 62 IN | OXYGEN SATURATION: 97 % | DIASTOLIC BLOOD PRESSURE: 76 MMHG

## 2024-10-10 DIAGNOSIS — E78.2 MIXED HYPERLIPIDEMIA: Primary | ICD-10-CM

## 2024-10-10 DIAGNOSIS — I25.10 CORONARY ARTERY DISEASE INVOLVING NATIVE CORONARY ARTERY OF NATIVE HEART WITHOUT ANGINA PECTORIS: ICD-10-CM

## 2024-10-10 DIAGNOSIS — E04.1 THYROID NODULE: ICD-10-CM

## 2024-10-10 DIAGNOSIS — E11.69 TYPE 2 DIABETES MELLITUS WITH OTHER SPECIFIED COMPLICATION, WITHOUT LONG-TERM CURRENT USE OF INSULIN (HCC): ICD-10-CM

## 2024-10-10 DIAGNOSIS — D12.6 ADENOMATOUS POLYP OF COLON, UNSPECIFIED PART OF COLON: ICD-10-CM

## 2024-10-10 DIAGNOSIS — Z82.49 FAMILY HISTORY OF ISCHEMIC HEART DISEASE: ICD-10-CM

## 2024-10-10 DIAGNOSIS — E03.9 HYPOTHYROIDISM (ACQUIRED): ICD-10-CM

## 2024-10-10 DIAGNOSIS — D35.01 ADRENAL ADENOMA, RIGHT: ICD-10-CM

## 2024-10-10 PROBLEM — E27.9 ADRENAL NODULE (HCC): Status: ACTIVE | Noted: 2019-05-01

## 2024-10-10 PROBLEM — E11.9 DIABETES MELLITUS (HCC): Status: ACTIVE | Noted: 2024-10-10

## 2024-10-10 PROCEDURE — 99204 OFFICE O/P NEW MOD 45 MIN: CPT | Performed by: PHYSICIAN ASSISTANT

## 2024-10-10 RX ORDER — ASPIRIN 81 MG/1
81 TABLET ORAL DAILY
COMMUNITY
Start: 2023-10-18 | End: 2024-10-17

## 2024-10-10 RX ORDER — EMPAGLIFLOZIN 25 MG/1
25 TABLET, FILM COATED ORAL
COMMUNITY

## 2024-10-10 RX ORDER — FLUTICASONE PROPIONATE 50 MCG
1 SPRAY, SUSPENSION (ML) NASAL DAILY
COMMUNITY
Start: 2023-12-15

## 2024-10-10 RX ORDER — ESOMEPRAZOLE MAGNESIUM 40 MG/1
40 CAPSULE, DELAYED RELEASE ORAL 2 TIMES DAILY
COMMUNITY
Start: 2006-08-18

## 2024-10-10 RX ORDER — DULOXETIN HYDROCHLORIDE 60 MG/1
60 CAPSULE, DELAYED RELEASE ORAL
COMMUNITY
Start: 2024-10-04

## 2024-10-10 RX ORDER — LEVOTHYROXINE SODIUM 50 MCG
50 TABLET ORAL
COMMUNITY
Start: 2006-08-18

## 2024-10-10 RX ORDER — ALPRAZOLAM 0.5 MG
0.5 TABLET ORAL
COMMUNITY
Start: 2024-08-08

## 2024-10-10 RX ORDER — ATORVASTATIN CALCIUM 40 MG/1
40 TABLET, FILM COATED ORAL
COMMUNITY

## 2024-10-10 RX ORDER — ALBUTEROL SULFATE 90 UG/1
2 INHALANT RESPIRATORY (INHALATION) EVERY 6 HOURS PRN
COMMUNITY
Start: 2023-12-15

## 2024-10-10 RX ORDER — GLIMEPIRIDE 2 MG/1
2 TABLET ORAL
COMMUNITY
Start: 2006-08-18

## 2024-10-10 SDOH — ECONOMIC STABILITY: INCOME INSECURITY: HOW HARD IS IT FOR YOU TO PAY FOR THE VERY BASICS LIKE FOOD, HOUSING, MEDICAL CARE, AND HEATING?: SOMEWHAT HARD

## 2024-10-10 SDOH — ECONOMIC STABILITY: FOOD INSECURITY: WITHIN THE PAST 12 MONTHS, THE FOOD YOU BOUGHT JUST DIDN'T LAST AND YOU DIDN'T HAVE MONEY TO GET MORE.: NEVER TRUE

## 2024-10-10 SDOH — ECONOMIC STABILITY: FOOD INSECURITY: WITHIN THE PAST 12 MONTHS, YOU WORRIED THAT YOUR FOOD WOULD RUN OUT BEFORE YOU GOT MONEY TO BUY MORE.: NEVER TRUE

## 2024-10-10 ASSESSMENT — PATIENT HEALTH QUESTIONNAIRE - PHQ9
SUM OF ALL RESPONSES TO PHQ QUESTIONS 1-9: 1
SUM OF ALL RESPONSES TO PHQ9 QUESTIONS 1 & 2: 1
SUM OF ALL RESPONSES TO PHQ QUESTIONS 1-9: 1
1. LITTLE INTEREST OR PLEASURE IN DOING THINGS: NOT AT ALL
SUM OF ALL RESPONSES TO PHQ QUESTIONS 1-9: 1
2. FEELING DOWN, DEPRESSED OR HOPELESS: SEVERAL DAYS
SUM OF ALL RESPONSES TO PHQ QUESTIONS 1-9: 1

## 2024-10-10 ASSESSMENT — ENCOUNTER SYMPTOMS
SHORTNESS OF BREATH: 0
COUGH: 0

## 2024-10-10 NOTE — PROGRESS NOTES
[facility]  [address] [place]   [place] [address]  Phone: [phone]  Fax [phone]  Provider : Ange Melton PA-C      Patient: Micaela Fisher  YOB: 1970  Patient Age 54 y.o.  Patient sex: female  Medical Record:  [id number]  Visit Date:10/10/2024   Author:  Ange Melton PA-C    Family Practice Clinic Note     Chief complaint  Micaela Fisher  is a 54 y.o. female who was seen on 10/12/24  7:42 PM  for the following reasons:    Chief Complaint   Patient presents with    New Patient     Establish care--had labs done in     Other     Needs referral to Endo       Current Medical problems addressed    She transferred here from [place] [facility]   and has hx of thyroid nodules,  GERD, HLD, bipolar I and gastroparesis type 2 dm not on insulin and smoker and during ct screen on lungs noted adrenal glad nodule  2.3 x2.1cm on right , [place] (seen on ct abdomen)  and hypertriglyceridemia  and hx of colon polyp and she is going back to [place] for her colonoscopy as they recheck int every 3 years.- last finding was tubular adenomas in    And last ct done 2024      Dad  around age 44 of Heart attack and paternal grandfather  of MI and unknown age, Patient had a balloon procedure in her 30s.  Maternal grandmother had DM and heart disease and maternal gf  in his 90s and mother has stage 4 lung cancer  She notes she is using [facility] until January with insurance changing it over.     Last labs in 2024  A1c is 7.4,  tsh 3.26, free t40.94,   Lipid  153/315/32/58  panel   Cmp glucose 150   ASSESSMENT AND PLAN    ICD-10-CM    1. Mixed hyperlipidemia  E78.2 [facility] [place]     Lipid Panel      2. Thyroid nodule  E04.1 External Referral to Endocrinology     TSH     T4, Free      3. Adrenal adenoma, right  D35.01 External Referral to Endocrinology      4. Type 2 diabetes mellitus with other specified complication,

## 2024-10-21 ENCOUNTER — TELEPHONE (OUTPATIENT)
Dept: FAMILY MEDICINE CLINIC | Facility: CLINIC | Age: 54
End: 2024-10-21

## 2024-10-21 NOTE — TELEPHONE ENCOUNTER
Sandi from [facility] needs to know which \"location of Dr. De Leon\"--[place] or [place]?      In addition, the \"referring diagnosis\" is needed for her referral.    Please fax this information to [phone].

## 2024-10-23 NOTE — TELEPHONE ENCOUNTER
I provided Mrs. Juinor the three diagnosis codes.  I informed her that she can look on \"Care Everywhere\", but she wasn't able to find her.  I gave her the MRN# and she still wasn't able to find her.  She said that maybe they don't have the same system as ours.      She stated that the referral paperwork is not readable.      She needs to know if the patient needs to be seen by Dr. Mejia because he's only works in [place].    Please re-fax readable progress notes and the most recent labs including her last A1C to [phone].    If you still have any further questions, please call her back at [phone].

## 2024-10-24 NOTE — TELEPHONE ENCOUNTER
Spoke with Sandi and provided information needed. They have not created the patient a chart yet, so couldn't see CareEverywhere notes. She is going to reach out to patient to schedule.

## 2024-11-06 ENCOUNTER — INITIAL CONSULT (OUTPATIENT)
Age: 54
End: 2024-11-06
Payer: MEDICARE

## 2024-11-06 VITALS
HEART RATE: 89 BPM | SYSTOLIC BLOOD PRESSURE: 120 MMHG | BODY MASS INDEX: 28.73 KG/M2 | DIASTOLIC BLOOD PRESSURE: 82 MMHG | HEIGHT: 62 IN | WEIGHT: 156.1 LBS

## 2024-11-06 DIAGNOSIS — E78.2 MIXED HYPERLIPIDEMIA: Chronic | ICD-10-CM

## 2024-11-06 DIAGNOSIS — I25.10 CORONARY ARTERY DISEASE INVOLVING NATIVE CORONARY ARTERY OF NATIVE HEART WITHOUT ANGINA PECTORIS: Chronic | ICD-10-CM

## 2024-11-06 DIAGNOSIS — Z76.89 ENCOUNTER TO ESTABLISH CARE WITH NEW DOCTOR: Primary | ICD-10-CM

## 2024-11-06 DIAGNOSIS — R07.89 OTHER CHEST PAIN: ICD-10-CM

## 2024-11-06 PROCEDURE — 99204 OFFICE O/P NEW MOD 45 MIN: CPT | Performed by: INTERNAL MEDICINE

## 2024-11-06 PROCEDURE — 93000 ELECTROCARDIOGRAM COMPLETE: CPT | Performed by: INTERNAL MEDICINE

## 2024-11-06 NOTE — PROGRESS NOTES
[address] [facility], [place] [address]  PHONE: [phone]    SUBJECTIVE:   Micaela Fisher is a 54 y.o. female 1970   seen for a consultation visit regarding the following:     Chief Complaint   Patient presents with    Consultation    Hyperlipidemia              Consultation is requested for evaluation of Consultation and Hyperlipidemia   .    History of present illness: 54 y.o. female with PMH HLD, tobacco use, GERD, T2DM, NOCAD  presenting to establish care with cardiology. Patient reports intermittent chest pain. She reports tightness/pain in her that is not associated with exertion. She has MURRAY.  Father had MI at age 44. She is concerned about her heart risk.      Past Medical History, Past Surgical History, Family history, Social History, and Medications were all reviewed with the patient today and updated as necessary.       No Known Allergies  Past Medical History:   Diagnosis Date    Anxiety     Bipolar disorder (HCC)     Depression     GERD (gastroesophageal reflux disease)     Irritable bowel syndrome     Type 2 diabetes mellitus without complication (HCC)      Past Surgical History:   Procedure Laterality Date    APPENDECTOMY       SECTION      CHOLECYSTECTOMY      COLONOSCOPY      HERNIA REPAIR      TUBAL LIGATION      UPPER GASTROINTESTINAL ENDOSCOPY       Family History   Problem Relation Age of Onset    Depression Father     Early Death Father     Heart Attack Father     Heart Disease Father     High Blood Pressure Father     High Cholesterol Father     Mental Illness Father     Diabetes Maternal Grandmother     Depression Paternal Aunt      Social History     Tobacco Use    Smoking status: Every Day     Current packs/day: 0.50     Average packs/day: 0.5 packs/day for 40.0 years (20.0 ttl pk-yrs)     Types: Cigarettes    Smokeless tobacco: Never   Substance Use Topics    Alcohol use: Not Currently       ROS:    Review of Systems   Cardiovascular:

## 2024-11-07 ENCOUNTER — TELEPHONE (OUTPATIENT)
Dept: FAMILY MEDICINE CLINIC | Facility: CLINIC | Age: 54
End: 2024-11-07

## 2024-11-07 DIAGNOSIS — E04.2 MULTIPLE THYROID NODULES: ICD-10-CM

## 2024-11-07 DIAGNOSIS — D35.01 ADRENAL ADENOMA, RIGHT: Primary | ICD-10-CM

## 2024-11-07 LAB — LPA SERPL-SCNC: 14.2 NMOL/L

## 2024-11-07 NOTE — TELEPHONE ENCOUNTER
----- Message from Mihai HOPKINS sent at 11/7/2024  1:22 PM EST -----  Regarding: [facility] Referral Request  [facility] Referral Request    Reason for referral request: Specialty Provider    Specialist/Lab/Test patient is requesting (if known): Endocrinologist     Specialist Phone Number (if applicable): Steve Castellano [phone] , [address] [place] [address]    Additional Information . Patient already had a referral to see an endocrinologists [facility] but change her mind and would like to see a endocrinologist at [facility] and she would need to have a new referral.  --------------------------------------------------------------------------------------------------------------------------    Relationship to Patient: Self     Call Back Information: OK to leave message on voicemail  Preferred Call Back Number: Phone [phone]

## 2024-11-22 ENCOUNTER — TELEPHONE (OUTPATIENT)
Age: 54
End: 2024-11-22

## 2024-11-22 NOTE — TELEPHONE ENCOUNTER
Spoke with pt regarding blood work results due to [employer] message not being read. Pt voiced understanding.

## 2025-01-07 ENCOUNTER — APPOINTMENT (OUTPATIENT)
Dept: URBAN - NONMETROPOLITAN AREA CLINIC 1 | Facility: CLINIC | Age: 55
Setting detail: DERMATOLOGY
End: 2025-01-07

## 2025-01-07 DIAGNOSIS — L57.8 OTHER SKIN CHANGES DUE TO CHRONIC EXPOSURE TO NONIONIZING RADIATION: ICD-10-CM | Status: STABLE

## 2025-01-07 DIAGNOSIS — D485 NEOPLASM OF UNCERTAIN BEHAVIOR OF SKIN: ICD-10-CM

## 2025-01-07 PROBLEM — D48.5 NEOPLASM OF UNCERTAIN BEHAVIOR OF SKIN: Status: ACTIVE | Noted: 2025-01-07

## 2025-01-07 PROCEDURE — ? SHAVE REMOVAL

## 2025-01-07 PROCEDURE — ? COUNSELING

## 2025-01-07 PROCEDURE — 11305 SHAVE SKIN LESION 0.5 CM/<: CPT

## 2025-01-07 PROCEDURE — 11306 SHAVE SKIN LESION 0.6-1.0 CM: CPT

## 2025-01-07 PROCEDURE — ? SUNSCREEN RECOMMENDATIONS

## 2025-01-07 PROCEDURE — 99213 OFFICE O/P EST LOW 20 MIN: CPT | Mod: 25

## 2025-01-07 ASSESSMENT — LOCATION DETAILED DESCRIPTION DERM
LOCATION DETAILED: LEFT SUPERIOR PARIETAL SCALP
LOCATION DETAILED: RIGHT DISTAL ULNAR THUMB
LOCATION DETAILED: LEFT SUPERIOR MEDIAL UPPER BACK

## 2025-01-07 ASSESSMENT — LOCATION ZONE DERM
LOCATION ZONE: SCALP
LOCATION ZONE: FINGER
LOCATION ZONE: TRUNK

## 2025-01-07 ASSESSMENT — LOCATION SIMPLE DESCRIPTION DERM
LOCATION SIMPLE: RIGHT THUMB
LOCATION SIMPLE: LEFT UPPER BACK
LOCATION SIMPLE: SCALP

## 2025-01-07 NOTE — HPI: SKIN LESIONS
Is This A New Presentation, Or A Follow-Up?: Skin Lesions
Additional History: Patient states she has a lesion on her scalp that has been there for 2 months that bleeds. She also has a lesion on her thumb that hurts.

## 2025-01-07 NOTE — PROCEDURE: SHAVE REMOVAL
Medical Necessity Information: It is in your best interest to select a reason for this procedure from the list below. All of these items fulfill various CMS LCD requirements except the new and changing color options.
Medical Necessity Clause: This procedure was medically necessary because the lesion that was treated was:
Lab: -97
Lab Facility: 3
Detail Level: Detailed
Was A Bandage Applied: Yes
Size Of Lesion In Cm (Required): 0.7
X Size Of Lesion In Cm (Optional): 0
Depth Of Shave: dermis
Biopsy Method: Dermablade
Anesthesia Type: 1% lidocaine with epinephrine
Hemostasis: Drysol
Wound Care: Petrolatum
Render Path Notes In Note?: No
Consent was obtained from the patient. The risks and benefits to therapy were discussed in detail. Specifically, the risks of infection, scarring, bleeding, prolonged wound healing, incomplete removal, allergy to anesthesia, nerve injury and recurrence were addressed. Prior to the procedure, the treatment site was clearly identified and confirmed by the patient. All components of Universal Protocol/PAUSE Rule completed.
Post-Care Instructions: I reviewed with the patient in detail post-care instructions. Patient is to keep the biopsy site dry overnight, and then apply bacitracin twice daily until healed. Patient may apply hydrogen peroxide soaks to remove any crusting.
Notification Instructions: Patient will be notified of pathology results. However, patient instructed to call the office if not contacted within 2 weeks.
Billing Type: Third-Party Bill
Size Of Lesion In Cm (Required): 0.4

## 2025-01-21 DIAGNOSIS — K21.9 GASTROESOPHAGEAL REFLUX DISEASE WITHOUT ESOPHAGITIS: ICD-10-CM

## 2025-01-21 RX ORDER — ESOMEPRAZOLE MAGNESIUM 40 MG/1
40 CAPSULE, DELAYED RELEASE ORAL 2 TIMES DAILY
Qty: 180 CAPSULE | Refills: 1 | Status: SHIPPED | OUTPATIENT
Start: 2025-01-21

## 2025-02-11 ENCOUNTER — CLINICAL DOCUMENTATION (OUTPATIENT)
Dept: FAMILY MEDICINE CLINIC | Facility: CLINIC | Age: 55
End: 2025-02-11

## 2025-04-16 ENCOUNTER — OFFICE VISIT (OUTPATIENT)
Dept: ENDOCRINOLOGY | Age: 55
End: 2025-04-16
Payer: MEDICARE

## 2025-04-16 VITALS
HEART RATE: 95 BPM | WEIGHT: 154.8 LBS | OXYGEN SATURATION: 97 % | BODY MASS INDEX: 29.23 KG/M2 | DIASTOLIC BLOOD PRESSURE: 84 MMHG | SYSTOLIC BLOOD PRESSURE: 132 MMHG | HEIGHT: 61 IN

## 2025-04-16 DIAGNOSIS — D35.01 ADENOMA OF RIGHT ADRENAL GLAND: ICD-10-CM

## 2025-04-16 DIAGNOSIS — E11.65 TYPE 2 DIABETES MELLITUS WITH HYPERGLYCEMIA, WITHOUT LONG-TERM CURRENT USE OF INSULIN (HCC): ICD-10-CM

## 2025-04-16 DIAGNOSIS — E04.2 MULTIPLE THYROID NODULES: Primary | ICD-10-CM

## 2025-04-16 DIAGNOSIS — E03.9 PRIMARY HYPOTHYROIDISM: ICD-10-CM

## 2025-04-16 DIAGNOSIS — E78.00 HYPERCHOLESTEREMIA: ICD-10-CM

## 2025-04-16 PROCEDURE — G2211 COMPLEX E/M VISIT ADD ON: HCPCS | Performed by: INTERNAL MEDICINE

## 2025-04-16 PROCEDURE — 3046F HEMOGLOBIN A1C LEVEL >9.0%: CPT | Performed by: INTERNAL MEDICINE

## 2025-04-16 PROCEDURE — G8419 CALC BMI OUT NRM PARAM NOF/U: HCPCS | Performed by: INTERNAL MEDICINE

## 2025-04-16 PROCEDURE — G8427 DOCREV CUR MEDS BY ELIG CLIN: HCPCS | Performed by: INTERNAL MEDICINE

## 2025-04-16 PROCEDURE — 99205 OFFICE O/P NEW HI 60 MIN: CPT | Performed by: INTERNAL MEDICINE

## 2025-04-16 PROCEDURE — 2022F DILAT RTA XM EVC RTNOPTHY: CPT | Performed by: INTERNAL MEDICINE

## 2025-04-16 PROCEDURE — 3017F COLORECTAL CA SCREEN DOC REV: CPT | Performed by: INTERNAL MEDICINE

## 2025-04-16 PROCEDURE — 4004F PT TOBACCO SCREEN RCVD TLK: CPT | Performed by: INTERNAL MEDICINE

## 2025-04-16 RX ORDER — LEVOTHYROXINE SODIUM 50 MCG
50 TABLET ORAL DAILY
Qty: 90 TABLET | Refills: 3 | Status: SHIPPED
Start: 2025-04-16

## 2025-04-16 RX ORDER — ATORVASTATIN CALCIUM 40 MG/1
40 TABLET, FILM COATED ORAL DAILY
Qty: 90 TABLET | Refills: 3 | Status: SHIPPED
Start: 2025-04-16

## 2025-04-16 RX ORDER — EMPAGLIFLOZIN 25 MG/1
25 TABLET, FILM COATED ORAL DAILY
Qty: 90 TABLET | Refills: 3 | Status: SHIPPED
Start: 2025-04-16

## 2025-04-16 RX ORDER — GLIMEPIRIDE 2 MG/1
2 TABLET ORAL
Qty: 90 TABLET | Refills: 3 | Status: SHIPPED
Start: 2025-04-16

## 2025-04-16 ASSESSMENT — ENCOUNTER SYMPTOMS
CONSTIPATION: 1
DIARRHEA: 1
TROUBLE SWALLOWING: 1

## 2025-04-16 NOTE — PROGRESS NOTES
SYNTHROID 50 MCG tablet Take 1 tablet by mouth Daily 90 tablet 3    metFORMIN (GLUCOPHAGE) 500 MG tablet Take 1 tablet by mouth 2 times daily (with meals) 180 tablet 3    JARDIANCE 25 MG tablet Take 1 tablet by mouth daily 90 tablet 3    AMARYL 2 MG tablet Take 1 tablet by mouth every morning (before breakfast) 90 tablet 3    atorvastatin (LIPITOR) 40 MG tablet Take 1 tablet by mouth daily 90 tablet 3    ALPRAZolam (XANAX) 0.5 MG tablet 1 tablet.      DULoxetine (CYMBALTA) 60 MG extended release capsule 1 capsule      esomeprazole (NEXIUM) 40 MG delayed release capsule 1 capsule in the morning and at bedtime      albuterol sulfate HFA (PROVENTIL;VENTOLIN;PROAIR) 108 (90 Base) MCG/ACT inhaler Inhale 2 puffs into the lungs every 6 hours as needed      fluticasone (FLONASE) 50 MCG/ACT nasal spray 1 spray by Nasal route daily      aspirin 81 MG EC tablet Take 1 tablet by mouth daily       No current facility-administered medications for this visit.       ELIAZAR Castellano MD, FACE      Portions of this note were generated with the assistance of voice recognition software.  As such, some errors in transcription may be present.

## 2025-05-06 ENCOUNTER — APPOINTMENT (OUTPATIENT)
Dept: URBAN - NONMETROPOLITAN AREA CLINIC 1 | Facility: CLINIC | Age: 55
Setting detail: DERMATOLOGY
End: 2025-05-06

## 2025-05-06 DIAGNOSIS — D18.0 HEMANGIOMA: ICD-10-CM | Status: UNCHANGED

## 2025-05-06 DIAGNOSIS — L82.1 OTHER SEBORRHEIC KERATOSIS: ICD-10-CM | Status: UNCHANGED

## 2025-05-06 DIAGNOSIS — L57.8 OTHER SKIN CHANGES DUE TO CHRONIC EXPOSURE TO NONIONIZING RADIATION: ICD-10-CM | Status: STABLE

## 2025-05-06 DIAGNOSIS — D22 MELANOCYTIC NEVI: ICD-10-CM | Status: STABLE

## 2025-05-06 DIAGNOSIS — Z85.828 PERSONAL HISTORY OF OTHER MALIGNANT NEOPLASM OF SKIN: ICD-10-CM | Status: STABLE

## 2025-05-06 DIAGNOSIS — L81.4 OTHER MELANIN HYPERPIGMENTATION: ICD-10-CM | Status: UNCHANGED

## 2025-05-06 PROBLEM — D22.5 MELANOCYTIC NEVI OF TRUNK: Status: ACTIVE | Noted: 2025-05-06

## 2025-05-06 PROBLEM — D18.01 HEMANGIOMA OF SKIN AND SUBCUTANEOUS TISSUE: Status: ACTIVE | Noted: 2025-05-06

## 2025-05-06 PROCEDURE — ? SUNSCREEN RECOMMENDATIONS

## 2025-05-06 PROCEDURE — 99213 OFFICE O/P EST LOW 20 MIN: CPT

## 2025-05-06 PROCEDURE — ? OBSERVATION

## 2025-05-06 PROCEDURE — ? COUNSELING

## 2025-05-06 PROCEDURE — ? FULL BODY SKIN EXAM

## 2025-05-06 ASSESSMENT — LOCATION ZONE DERM
LOCATION ZONE: LEG
LOCATION ZONE: TRUNK
LOCATION ZONE: FACE
LOCATION ZONE: ARM
LOCATION ZONE: NECK

## 2025-05-06 ASSESSMENT — LOCATION DETAILED DESCRIPTION DERM
LOCATION DETAILED: LEFT PROXIMAL DORSAL FOREARM
LOCATION DETAILED: RIGHT SUPERIOR CENTRAL MALAR CHEEK
LOCATION DETAILED: SUPERIOR MID FOREHEAD
LOCATION DETAILED: RIGHT MID-UPPER BACK
LOCATION DETAILED: LEFT INFERIOR LATERAL NECK
LOCATION DETAILED: RIGHT ANTERIOR PROXIMAL THIGH
LOCATION DETAILED: RIGHT PROXIMAL DORSAL FOREARM

## 2025-05-06 ASSESSMENT — LOCATION SIMPLE DESCRIPTION DERM
LOCATION SIMPLE: RIGHT FOREARM
LOCATION SIMPLE: LEFT ANTERIOR NECK
LOCATION SIMPLE: RIGHT THIGH
LOCATION SIMPLE: SUPERIOR FOREHEAD
LOCATION SIMPLE: RIGHT CHEEK
LOCATION SIMPLE: LEFT FOREARM
LOCATION SIMPLE: RIGHT UPPER BACK

## 2025-06-02 ENCOUNTER — COMMUNITY OUTREACH (OUTPATIENT)
Dept: FAMILY MEDICINE CLINIC | Facility: CLINIC | Age: 55
End: 2025-06-02

## 2025-06-02 SDOH — ECONOMIC STABILITY: FOOD INSECURITY: WITHIN THE PAST 12 MONTHS, THE FOOD YOU BOUGHT JUST DIDN'T LAST AND YOU DIDN'T HAVE MONEY TO GET MORE.: NEVER TRUE

## 2025-06-02 SDOH — ECONOMIC STABILITY: FOOD INSECURITY: WITHIN THE PAST 12 MONTHS, YOU WORRIED THAT YOUR FOOD WOULD RUN OUT BEFORE YOU GOT MONEY TO BUY MORE.: NEVER TRUE

## 2025-06-02 SDOH — ECONOMIC STABILITY: INCOME INSECURITY: IN THE LAST 12 MONTHS, WAS THERE A TIME WHEN YOU WERE NOT ABLE TO PAY THE MORTGAGE OR RENT ON TIME?: NO

## 2025-06-02 SDOH — ECONOMIC STABILITY: TRANSPORTATION INSECURITY
IN THE PAST 12 MONTHS, HAS LACK OF TRANSPORTATION KEPT YOU FROM MEETINGS, WORK, OR FROM GETTING THINGS NEEDED FOR DAILY LIVING?: NO

## 2025-06-02 SDOH — ECONOMIC STABILITY: TRANSPORTATION INSECURITY
IN THE PAST 12 MONTHS, HAS THE LACK OF TRANSPORTATION KEPT YOU FROM MEDICAL APPOINTMENTS OR FROM GETTING MEDICATIONS?: NO

## 2025-06-02 ASSESSMENT — PATIENT HEALTH QUESTIONNAIRE - PHQ9
5. POOR APPETITE OR OVEREATING: NOT AT ALL
4. FEELING TIRED OR HAVING LITTLE ENERGY: SEVERAL DAYS
7. TROUBLE CONCENTRATING ON THINGS, SUCH AS READING THE NEWSPAPER OR WATCHING TELEVISION: SEVERAL DAYS
4. FEELING TIRED OR HAVING LITTLE ENERGY: SEVERAL DAYS
9. THOUGHTS THAT YOU WOULD BE BETTER OFF DEAD, OR OF HURTING YOURSELF: NOT AT ALL
8. MOVING OR SPEAKING SO SLOWLY THAT OTHER PEOPLE COULD HAVE NOTICED. OR THE OPPOSITE, BEING SO FIGETY OR RESTLESS THAT YOU HAVE BEEN MOVING AROUND A LOT MORE THAN USUAL: NOT AT ALL
8. MOVING OR SPEAKING SO SLOWLY THAT OTHER PEOPLE COULD HAVE NOTICED. OR THE OPPOSITE - BEING SO FIDGETY OR RESTLESS THAT YOU HAVE BEEN MOVING AROUND A LOT MORE THAN USUAL: NOT AT ALL
10. IF YOU CHECKED OFF ANY PROBLEMS, HOW DIFFICULT HAVE THESE PROBLEMS MADE IT FOR YOU TO DO YOUR WORK, TAKE CARE OF THINGS AT HOME, OR GET ALONG WITH OTHER PEOPLE: NOT DIFFICULT AT ALL
SUM OF ALL RESPONSES TO PHQ QUESTIONS 1-9: 5
1. LITTLE INTEREST OR PLEASURE IN DOING THINGS: SEVERAL DAYS
2. FEELING DOWN, DEPRESSED OR HOPELESS: SEVERAL DAYS
2. FEELING DOWN, DEPRESSED OR HOPELESS: SEVERAL DAYS
9. THOUGHTS THAT YOU WOULD BE BETTER OFF DEAD, OR OF HURTING YOURSELF: NOT AT ALL
3. TROUBLE FALLING OR STAYING ASLEEP: SEVERAL DAYS
6. FEELING BAD ABOUT YOURSELF - OR THAT YOU ARE A FAILURE OR HAVE LET YOURSELF OR YOUR FAMILY DOWN: NOT AT ALL
SUM OF ALL RESPONSES TO PHQ QUESTIONS 1-9: 5
7. TROUBLE CONCENTRATING ON THINGS, SUCH AS READING THE NEWSPAPER OR WATCHING TELEVISION: SEVERAL DAYS
10. IF YOU CHECKED OFF ANY PROBLEMS, HOW DIFFICULT HAVE THESE PROBLEMS MADE IT FOR YOU TO DO YOUR WORK, TAKE CARE OF THINGS AT HOME, OR GET ALONG WITH OTHER PEOPLE: NOT DIFFICULT AT ALL
1. LITTLE INTEREST OR PLEASURE IN DOING THINGS: SEVERAL DAYS
3. TROUBLE FALLING OR STAYING ASLEEP: SEVERAL DAYS
SUM OF ALL RESPONSES TO PHQ QUESTIONS 1-9: 5
5. POOR APPETITE OR OVEREATING: NOT AT ALL
6. FEELING BAD ABOUT YOURSELF - OR THAT YOU ARE A FAILURE OR HAVE LET YOURSELF OR YOUR FAMILY DOWN: NOT AT ALL
SUM OF ALL RESPONSES TO PHQ QUESTIONS 1-9: 5
SUM OF ALL RESPONSES TO PHQ QUESTIONS 1-9: 5

## 2025-06-04 ENCOUNTER — OFFICE VISIT (OUTPATIENT)
Dept: FAMILY MEDICINE CLINIC | Facility: CLINIC | Age: 55
End: 2025-06-04
Payer: MEDICARE

## 2025-06-04 VITALS
HEART RATE: 72 BPM | DIASTOLIC BLOOD PRESSURE: 80 MMHG | TEMPERATURE: 98.4 F | SYSTOLIC BLOOD PRESSURE: 115 MMHG | WEIGHT: 155.4 LBS | HEIGHT: 61 IN | BODY MASS INDEX: 29.34 KG/M2 | OXYGEN SATURATION: 97 %

## 2025-06-04 DIAGNOSIS — J01.01 ACUTE RECURRENT MAXILLARY SINUSITIS: Primary | ICD-10-CM

## 2025-06-04 PROCEDURE — G8428 CUR MEDS NOT DOCUMENT: HCPCS | Performed by: PHYSICIAN ASSISTANT

## 2025-06-04 PROCEDURE — G8419 CALC BMI OUT NRM PARAM NOF/U: HCPCS | Performed by: PHYSICIAN ASSISTANT

## 2025-06-04 PROCEDURE — 3017F COLORECTAL CA SCREEN DOC REV: CPT | Performed by: PHYSICIAN ASSISTANT

## 2025-06-04 PROCEDURE — 4004F PT TOBACCO SCREEN RCVD TLK: CPT | Performed by: PHYSICIAN ASSISTANT

## 2025-06-04 PROCEDURE — 99213 OFFICE O/P EST LOW 20 MIN: CPT | Performed by: PHYSICIAN ASSISTANT

## 2025-06-04 ASSESSMENT — ENCOUNTER SYMPTOMS
COUGH: 0
SINUS PRESSURE: 1
WHEEZING: 0
SORE THROAT: 1
SINUS PAIN: 1

## 2025-06-04 NOTE — PROGRESS NOTES
[facility]  [address]   [place] [address]  Phone: [phone]  Fax [phone]  Provider : Ange Melton PA-C      Patient: Micaela Fisher  YOB: 1970  Patient Age 54 y.o.  Patient sex: female  Medical Record:  [id number]  Visit Date:6/4/2025   Author:  Ange Melton PA-C    Family Practice Clinic Note     Chief complaint  Micaela Fisher  is a 54 y.o. female who was seen on 6/4/2025 for the following reasons:    Chief Complaint   Patient presents with    Ear Pain     Bilateral--feels sinus infection is starting-sx's x 1 wk    Blood Sugar Problem     Running high       Current Medical problems addressed    Sinus pain in maxillary and gets them frequently and she notes this week sugars also started going up     ASSESSMENT AND PLAN    ICD-10-CM    1. Acute recurrent maxillary sinusitis  J01.01          Micaela \"Erich\" was seen today for ear pain and blood sugar problem.    Diagnoses and all orders for this visit:    Acute recurrent maxillary sinusitis    Other orders  -     amoxicillin-clavulanate (AUGMENTIN) 875-125 MG per tablet; Take 1 tablet by mouth 2 times daily for 10 days          Plan includes the following:  Plan  Advised to start antibiotics and return if not improving in 5-7 days or sooner if symptoms worsen.     No follow-up provider specified.  No orders of the defined types were placed in this encounter.      Past Medical History:  Allergies:No Known Allergies    Current Medications:   Medications marked \"taking\" at this time:  Current Outpatient Medications   Medication Sig Dispense Refill    amoxicillin-clavulanate (AUGMENTIN) 875-125 MG per tablet Take 1 tablet by mouth 2 times daily for 10 days 20 tablet 0    SYNTHROID 50 MCG tablet Take 1 tablet by mouth Daily 90 tablet 3    metFORMIN (GLUCOPHAGE) 500 MG tablet Take 1 tablet by mouth 2 times daily (with meals) 180 tablet 3    JARDIANCE 25 MG tablet Take 1 tablet by mouth daily 90 tablet 3    AMARYL

## 2025-06-24 LAB
ESTIMATED AVERAGE GLUCOSE: 157
HBA1C MFR BLD: 7.1 %

## 2025-07-17 ENCOUNTER — COMMUNITY OUTREACH (OUTPATIENT)
Dept: FAMILY MEDICINE CLINIC | Facility: CLINIC | Age: 55
End: 2025-07-17

## 2025-07-17 NOTE — PROGRESS NOTES
Patient's HM shows they are overdue for A1C.  Care Everywhere and  files searched.   updated with 6/23/25 A1C.

## 2025-07-29 DIAGNOSIS — K21.9 GASTROESOPHAGEAL REFLUX DISEASE WITHOUT ESOPHAGITIS: ICD-10-CM

## 2025-07-30 RX ORDER — ESOMEPRAZOLE MAGNESIUM 40 MG/1
40 CAPSULE, DELAYED RELEASE ORAL 2 TIMES DAILY
Qty: 180 CAPSULE | Refills: 1 | Status: SHIPPED | OUTPATIENT
Start: 2025-07-30

## 2025-08-25 SDOH — HEALTH STABILITY: PHYSICAL HEALTH: ON AVERAGE, HOW MANY DAYS PER WEEK DO YOU ENGAGE IN MODERATE TO STRENUOUS EXERCISE (LIKE A BRISK WALK)?: 2 DAYS

## 2025-08-25 SDOH — HEALTH STABILITY: PHYSICAL HEALTH: ON AVERAGE, HOW MANY MINUTES DO YOU ENGAGE IN EXERCISE AT THIS LEVEL?: 20 MIN

## 2025-08-25 ASSESSMENT — PATIENT HEALTH QUESTIONNAIRE - PHQ9
SUM OF ALL RESPONSES TO PHQ QUESTIONS 1-9: 11
SUM OF ALL RESPONSES TO PHQ QUESTIONS 1-9: 11
1. LITTLE INTEREST OR PLEASURE IN DOING THINGS: MORE THAN HALF THE DAYS
2. FEELING DOWN, DEPRESSED OR HOPELESS: MORE THAN HALF THE DAYS
SUM OF ALL RESPONSES TO PHQ QUESTIONS 1-9: 11
5. POOR APPETITE OR OVEREATING: NOT AT ALL
SUM OF ALL RESPONSES TO PHQ QUESTIONS 1-9: 11
9. THOUGHTS THAT YOU WOULD BE BETTER OFF DEAD, OR OF HURTING YOURSELF: NOT AT ALL
4. FEELING TIRED OR HAVING LITTLE ENERGY: SEVERAL DAYS
8. MOVING OR SPEAKING SO SLOWLY THAT OTHER PEOPLE COULD HAVE NOTICED. OR THE OPPOSITE, BEING SO FIGETY OR RESTLESS THAT YOU HAVE BEEN MOVING AROUND A LOT MORE THAN USUAL: SEVERAL DAYS
10. IF YOU CHECKED OFF ANY PROBLEMS, HOW DIFFICULT HAVE THESE PROBLEMS MADE IT FOR YOU TO DO YOUR WORK, TAKE CARE OF THINGS AT HOME, OR GET ALONG WITH OTHER PEOPLE: VERY DIFFICULT
7. TROUBLE CONCENTRATING ON THINGS, SUCH AS READING THE NEWSPAPER OR WATCHING TELEVISION: MORE THAN HALF THE DAYS
6. FEELING BAD ABOUT YOURSELF - OR THAT YOU ARE A FAILURE OR HAVE LET YOURSELF OR YOUR FAMILY DOWN: NOT AT ALL
3. TROUBLE FALLING OR STAYING ASLEEP: NEARLY EVERY DAY

## 2025-08-25 ASSESSMENT — LIFESTYLE VARIABLES
HOW MANY STANDARD DRINKS CONTAINING ALCOHOL DO YOU HAVE ON A TYPICAL DAY: 1
HOW OFTEN DO YOU HAVE A DRINK CONTAINING ALCOHOL: 2
HOW OFTEN DO YOU HAVE SIX OR MORE DRINKS ON ONE OCCASION: 1
HOW OFTEN DO YOU HAVE A DRINK CONTAINING ALCOHOL: MONTHLY OR LESS
HOW MANY STANDARD DRINKS CONTAINING ALCOHOL DO YOU HAVE ON A TYPICAL DAY: 1 OR 2

## 2025-08-26 ENCOUNTER — OFFICE VISIT (OUTPATIENT)
Dept: FAMILY MEDICINE CLINIC | Facility: CLINIC | Age: 55
End: 2025-08-26

## 2025-08-26 VITALS
TEMPERATURE: 98.3 F | DIASTOLIC BLOOD PRESSURE: 78 MMHG | OXYGEN SATURATION: 98 % | HEART RATE: 82 BPM | BODY MASS INDEX: 30.29 KG/M2 | SYSTOLIC BLOOD PRESSURE: 122 MMHG | HEIGHT: 61 IN | WEIGHT: 160.4 LBS

## 2025-08-26 DIAGNOSIS — K76.0 NON-ALCOHOLIC FATTY LIVER DISEASE: ICD-10-CM

## 2025-08-26 DIAGNOSIS — Z00.00 INITIAL MEDICARE ANNUAL WELLNESS VISIT: Primary | ICD-10-CM

## 2025-08-26 DIAGNOSIS — Z80.1 FAMILY HISTORY OF LUNG CANCER: ICD-10-CM

## 2025-08-26 DIAGNOSIS — E11.69 TYPE 2 DIABETES MELLITUS WITH OTHER SPECIFIED COMPLICATION, WITHOUT LONG-TERM CURRENT USE OF INSULIN (HCC): ICD-10-CM

## 2025-08-26 DIAGNOSIS — Z12.31 ENCOUNTER FOR SCREENING MAMMOGRAM FOR MALIGNANT NEOPLASM OF BREAST: ICD-10-CM

## 2025-08-26 DIAGNOSIS — Z87.891 PERSONAL HISTORY OF TOBACCO USE: ICD-10-CM

## 2025-08-26 RX ORDER — FLUTICASONE PROPIONATE 50 MCG
1 SPRAY, SUSPENSION (ML) NASAL DAILY
Qty: 3 EACH | Refills: 3 | Status: SHIPPED | OUTPATIENT
Start: 2025-08-26

## 2025-08-26 RX ORDER — FLUTICASONE PROPIONATE 50 MCG
1 SPRAY, SUSPENSION (ML) NASAL DAILY
Qty: 16 G | Refills: 4 | Status: SHIPPED | OUTPATIENT
Start: 2025-08-26 | End: 2025-08-26

## 2025-09-02 ENCOUNTER — TRANSCRIBE ORDERS (OUTPATIENT)
Dept: SCHEDULING | Age: 55
End: 2025-09-02

## 2025-09-02 DIAGNOSIS — Z12.31 OTHER SCREENING MAMMOGRAM: Primary | ICD-10-CM

## (undated) DEVICE — CHLORHEXIDINE 4PCT 4 OZ

## (undated) DEVICE — GLOVE INDICATOR PI UNDERGLOVE SZ 7.5 BLUE

## (undated) DEVICE — PLUMEPEN PRO 10FT

## (undated) DEVICE — MAXI PAD5.51 X 13.78 IN. (14.0 X 35.0 CM)HEAVYCONTOUREDUNSCENTED: Brand: CURITY

## (undated) DEVICE — STRL ALLENTOWN HYSTEROSCOPY PK: Brand: CARDINAL HEALTH

## (undated) DEVICE — 1820 FOAM BLOCK NEEDLE COUNTER: Brand: DEVON

## (undated) DEVICE — SYRINGE BULB 2 OZ

## (undated) DEVICE — INTENDED FOR TISSUE SEPARATION, AND OTHER PROCEDURES THAT REQUIRE A SHARP SURGICAL BLADE TO PUNCTURE OR CUT.: Brand: BARD-PARKER ® CARBON RIB-BACK BLADES

## (undated) DEVICE — PREMIUM DRY TRAY LF: Brand: MEDLINE INDUSTRIES, INC.

## (undated) DEVICE — DISPOSABLE BRIEF/UNDERWEAR

## (undated) DEVICE — SUT VICRYL 3-0 SH 27 IN J416H

## (undated) DEVICE — DECANTER: Brand: UNBRANDED

## (undated) DEVICE — SYRINGE 10ML LL

## (undated) DEVICE — SKIN MARKER DUAL TIP WITH RULER CAP, FLEXIBLE RULER AND LABELS: Brand: DEVON

## (undated) DEVICE — ADHESIVE SKIN HIGH VISCOSITY EXOFIN 1ML

## (undated) DEVICE — DRAPE SHEET THREE QUARTER

## (undated) DEVICE — GLOVE PI ULTRA TOUCH SZ.7.5

## (undated) DEVICE — SUT VICRYL 2-0 SH 27 IN UNDYED J417H

## (undated) DEVICE — NEEDLE 25G X 1 1/2